# Patient Record
Sex: FEMALE | Race: WHITE | Employment: UNEMPLOYED | URBAN - METROPOLITAN AREA
[De-identification: names, ages, dates, MRNs, and addresses within clinical notes are randomized per-mention and may not be internally consistent; named-entity substitution may affect disease eponyms.]

---

## 2017-01-03 ENCOUNTER — TELEPHONE (OUTPATIENT)
Dept: SURGERY | Facility: CLINIC | Age: 37
End: 2017-01-03

## 2017-01-03 NOTE — TELEPHONE ENCOUNTER
Called patient to inform her that pathology called Dr. Lola Ceron with preliminary results from the core biopsy done 12/31. Preliminary review shows inflammation however the pathology has been sent to [facility] for further review.  Advised patient we will call

## 2017-01-04 ENCOUNTER — TELEPHONE (OUTPATIENT)
Dept: NEUROLOGY | Facility: CLINIC | Age: 37
End: 2017-01-04

## 2017-01-04 NOTE — TELEPHONE ENCOUNTER
Ada Jones, pharmacist with [facility], calling for clarification on Lyrica. Rx written for #90, 100mg BID. Patient had been receiving 100mg TID. Patient also recently discharged from hospital with Rx for 50mg per PCP, Dr. Von Trammell.      Spoke with patient to discu

## 2017-01-05 NOTE — TELEPHONE ENCOUNTER
Left message with Dr. Juanita Espitia office to f/u on medication issue. PSR stated Dr. Juanita Espitia nurse is in a meeting but will pass her the message to call us back. Advised PSR of office hours and phone number.

## 2017-01-06 NOTE — TELEPHONE ENCOUNTER
Spoke with Dr. Pee Juarez and she recommends pt Lyrica decreased to 50mg TID. Dr. Pee Juarez stated that when pt was recently admitted to the hospital pt noted to be sedated, and therefor Dr. Pee Juarez decreased Lyrica. Dr. Pee Juarez will not be taking over Lyrica Rx.

## 2017-01-10 ENCOUNTER — PATIENT MESSAGE (OUTPATIENT)
Dept: SURGERY | Facility: CLINIC | Age: 37
End: 2017-01-10

## 2017-01-10 NOTE — TELEPHONE ENCOUNTER
LMTCB; Per JOSE MIGUEL Sierra, patient to remain at 50mg TID for now. Made [facility] aware. Patient will need new Rx for Lyrica 50mg TID sent to pharmacy. Patient was given Rx from Dr. Elver Song 12/31/16 but never filled.

## 2017-01-10 NOTE — TELEPHONE ENCOUNTER
From: Noelle Gage  To: Alex Wilcox MD  Sent: 1/10/2017 9:14 AM CST  Subject: Prescription Question    Is my prescription for my patch ready to be picked up in [place]

## 2017-01-10 NOTE — TELEPHONE ENCOUNTER
Medication: Etodolac 200 MG    Date of last refill: 12/16/16  [place]    Last office visit: 8/26/16  Due back to clinic per last office note:  2-4 weeks after procedure  Date next office visit scheduled:  2/22/17    L

## 2017-01-11 RX ORDER — ETODOLAC 200 MG/1
CAPSULE ORAL
Qty: 90 CAPSULE | Refills: 0 | Status: SHIPPED | OUTPATIENT
Start: 2017-01-11 | End: 2017-03-16

## 2017-01-11 NOTE — TELEPHONE ENCOUNTER
Patient returning phone call, made aware of medication change to lyrica 50mg TID. Patient verbalized understanding. States she is not in need of refill at this time. No further needs.

## 2017-01-16 ENCOUNTER — OFFICE VISIT (OUTPATIENT)
Dept: SURGERY | Facility: CLINIC | Age: 37
End: 2017-01-16

## 2017-01-16 VITALS
DIASTOLIC BLOOD PRESSURE: 79 MMHG | HEART RATE: 122 BPM | WEIGHT: 195 LBS | SYSTOLIC BLOOD PRESSURE: 101 MMHG | BODY MASS INDEX: 34.55 KG/M2 | TEMPERATURE: 98 F | HEIGHT: 63 IN

## 2017-01-16 DIAGNOSIS — R22.32 MASS OF ARM, LEFT: Primary | ICD-10-CM

## 2017-01-16 PROCEDURE — 99213 OFFICE O/P EST LOW 20 MIN: CPT | Performed by: SURGERY

## 2017-01-17 DIAGNOSIS — M79.89 MASS OF SOFT TISSUE OF LEFT UPPER EXTREMITY: Primary | ICD-10-CM

## 2017-01-17 NOTE — H&P
[facility] Surgical Oncology    Patient Name:  Maritza Newton   YOB: 1980   Gender:  Female   Appt Date:  1/16/2017   Provider:  Jass Wolfe MD   Insurance:  Rosalina Cloud MD   Add •  Milnacipran HCl (SAVELLA) 25 MG Oral Tab, Take 1 tablet by mouth 2 (two) times daily. , Disp: 60 tablet, Rfl: 0  •  fentaNYL 25 MCG/HR Transdermal Patch 72 Hr, Place 1 patch onto the skin every third day.  DO NOT FILL BEFORE 1/5/17, Disp: 10 patch, Rfl: 0 right ankle surg x 3 times - no metal   • Other surgical history       EGD x 5 times   • Other surgical history       bile duct stent   • Other surgical history  08/16/16     left elbow cyst      Reviewed Social History:    Smoking Status: Current Every Musculoskeletal: About 3 cm soft tissue mass left triceps region with left upper extremity weakness and wrist drop. Skin: Inspection and palpation: no jaundice. Document Review:  Pathology reviewed and discussed as above.        Procedure(s):  None

## 2017-01-20 ENCOUNTER — TELEPHONE (OUTPATIENT)
Dept: SURGERY | Facility: CLINIC | Age: 37
End: 2017-01-20

## 2017-01-23 RX ORDER — MILNACIPRAN HYDROCHLORIDE 25 MG/1
TABLET, FILM COATED ORAL
Qty: 60 TABLET | Refills: 0 | Status: SHIPPED | OUTPATIENT
Start: 2017-01-23 | End: 2017-03-16

## 2017-01-23 NOTE — TELEPHONE ENCOUNTER
Medication: Savella 25 MG    Date of last refill: 12/28/16  [place]    Last office visit: 8/26/16  Due back to clinic per last office note:  Return for 2-4 wks after procedure.   Date next office visit scheduled:  2/3/

## 2017-01-24 ENCOUNTER — OFFICE VISIT (OUTPATIENT)
Dept: NEUROLOGY | Facility: CLINIC | Age: 37
End: 2017-01-24

## 2017-01-24 VITALS
BODY MASS INDEX: 34.55 KG/M2 | DIASTOLIC BLOOD PRESSURE: 84 MMHG | HEIGHT: 63 IN | SYSTOLIC BLOOD PRESSURE: 124 MMHG | HEART RATE: 74 BPM | RESPIRATION RATE: 16 BRPM | WEIGHT: 195 LBS

## 2017-01-24 DIAGNOSIS — G56.32 RADIAL NEUROPATHY, LEFT: Primary | ICD-10-CM

## 2017-01-24 DIAGNOSIS — M79.10 MYALGIA: ICD-10-CM

## 2017-01-24 PROCEDURE — 99215 OFFICE O/P EST HI 40 MIN: CPT | Performed by: OTHER

## 2017-01-24 NOTE — PATIENT INSTRUCTIONS
Follow up in 1 month   Have labs done  Refill policies:    • Allow 2 business days for refills; controlled substances may take longer.   • Contact your pharmacy at least 5 days prior to running out of medication and have them send an electronic request or s Precertification and Prior Authorizations  If your physician has recommended that you have a procedure or additional testing performed.   [facility]) will contact your insurance carrier to obtain pre-certification or prior author

## 2017-01-24 NOTE — H&P
[facility] Progress Note    HPI  Irais Mtz is a 44year old woman with past medical history of fibromyalgia, DJD, multiple pain medications, chronic migraines, who was admitted 12/27/16, for new onset of painful swelling in the OTHER SURGICAL HISTORY      Comment right ankle surg x 3 times - no metal    OTHER SURGICAL HISTORY      Comment EGD x 5 times    OTHER SURGICAL HISTORY      Comment bile duct stent    OTHER SURGICAL HISTORY  08/16/16    Comment left elbow cyst     Family Inhale 3 puffs into the lungs as needed for Wheezing., Disp: , Rfl:   •  ALPRAZOLAM ER OR, Take 2 mg by mouth 2 (two) times daily. , Disp: , Rfl:   •  Prazosin HCl 1 MG Oral Cap, Take 2 mg by mouth 2 (two) times daily.  Patient states she is taking Prazosi lesion/mass centered on the proximal to mid muscle belly of the triceps muscle that measures 7.6 cm in greatest dimension.  There is some mild muscle edema within the triceps muscle fibers distal to the lesion as well as evidence    of adjacent subcutaneous innervated muscles.  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   The presence of an evoked response from the radial nerve suggests that the continuity of the nerve is preserved.  The remainder of the nerve conduction studies and

## 2017-01-24 NOTE — PROGRESS NOTES
[facility] Progress Note    HPI  Patient presents with:  Wrist Pain: C/O left wrist pain since having cyst in the arm and have numbness in the arm and unable to lift arm      Penelopeian Julian is a 44year old woman with past medical hi uses cpap at night   • Depression    • Anxiety state, unspecified      also PTSD         Past Surgical History    CHOLECYSTECTOMY      COLONOSCOPY & POLYPECTOMY  5/14    Comment polyp; repeat 5 yrs    COLONOSCOPY,REMHAIR AGEE,SNARE N/A 5/30/2014    Comment Pr (Patient taking differently: Take 50 mg by mouth 2 (two) times daily.  ), Disp: 90 capsule, Rfl: 0  •  fentaNYL 25 MCG/HR Transdermal Patch 72 Hr, Place 1 patch onto the skin every third day.  DO NOT FILL BEFORE 1/5/17, Disp: 10 patch, Rfl: 0  •  Cyclobenza 5/5  WE: 2/5  FF: 5/5  FE: 2/5  APB: 4/5   Interosseous: 5/5      DTR: 2+ symmetric throughout, toes downgoing bilaterally, no clonus  Sensory: reduced over LUE distal to mid forarm over radial aspect of arm, dorsally; also reduced over ulnar aspect dorsal not  appreciated. 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   However, I would recommend careful clinical and radiologic correlation to ensure that the ma oncology. Otherwise, patient was advised, that radial neuropathy may require months to improve, and I would prefer to wait until after repeat excision, prior to considering repeat EMG.   In order to evaluate for possible systemic myopathy, will send a CPK

## 2017-01-25 NOTE — TELEPHONE ENCOUNTER
Informed pt she had 2 fup appts scheduled:2/3 with Miranda Rivers as a PAIN pt and 2/22 with Dr Angeles Gaviria pt only one appt is needed, canceled 2/3 with Bailey;pt understood and no further questions

## 2017-01-26 ENCOUNTER — ANESTHESIA EVENT (OUTPATIENT)
Dept: SURGERY | Facility: HOSPITAL | Age: 37
End: 2017-01-26

## 2017-01-26 NOTE — TELEPHONE ENCOUNTER
Medication: cyclobenzaprine    Date of last refill: 12/28/16  [place]    Last office visit: 8/26/16  Due back to clinic per last office note:  2-4 weeks after injections  Date next office visit scheduled:  2/22/17

## 2017-01-27 ENCOUNTER — HOSPITAL ENCOUNTER (OUTPATIENT)
Facility: HOSPITAL | Age: 37
Setting detail: HOSPITAL OUTPATIENT SURGERY
Discharge: HOME | End: 2017-01-27
Attending: SURGERY | Admitting: SURGERY
Payer: COMMERCIAL

## 2017-01-27 ENCOUNTER — ANESTHESIA (OUTPATIENT)
Dept: SURGERY | Facility: HOSPITAL | Age: 37
End: 2017-01-27

## 2017-01-27 ENCOUNTER — SURGERY (OUTPATIENT)
Age: 37
End: 2017-01-27

## 2017-01-27 VITALS
HEIGHT: 63 IN | WEIGHT: 190 LBS | HEART RATE: 82 BPM | DIASTOLIC BLOOD PRESSURE: 98 MMHG | TEMPERATURE: 99 F | RESPIRATION RATE: 16 BRPM | SYSTOLIC BLOOD PRESSURE: 132 MMHG | OXYGEN SATURATION: 99 % | BODY MASS INDEX: 33.66 KG/M2

## 2017-01-27 DIAGNOSIS — M79.89 MASS OF SOFT TISSUE OF LEFT UPPER EXTREMITY: ICD-10-CM

## 2017-01-27 LAB
POCT LOT NUMBER: NORMAL
POCT URINE PREGNANCY: NEGATIVE

## 2017-01-27 PROCEDURE — 88305 TISSUE EXAM BY PATHOLOGIST: CPT | Performed by: SURGERY

## 2017-01-27 PROCEDURE — 81025 URINE PREGNANCY TEST: CPT | Performed by: SURGERY

## 2017-01-27 PROCEDURE — 0KB80ZX EXCISION OF LEFT UPPER ARM MUSCLE, OPEN APPROACH, DIAGNOSTIC: ICD-10-PCS | Performed by: SURGERY

## 2017-01-27 RX ORDER — SODIUM CHLORIDE, SODIUM LACTATE, POTASSIUM CHLORIDE, CALCIUM CHLORIDE 600; 310; 30; 20 MG/100ML; MG/100ML; MG/100ML; MG/100ML
INJECTION, SOLUTION INTRAVENOUS CONTINUOUS
Status: DISCONTINUED | OUTPATIENT
Start: 2017-01-27 | End: 2017-01-27

## 2017-01-27 RX ORDER — BUPIVACAINE HYDROCHLORIDE 2.5 MG/ML
INJECTION, SOLUTION EPIDURAL; INFILTRATION; INTRACAUDAL AS NEEDED
Status: DISCONTINUED | OUTPATIENT
Start: 2017-01-27 | End: 2017-01-27 | Stop reason: HOSPADM

## 2017-01-27 RX ORDER — LIDOCAINE HYDROCHLORIDE 10 MG/ML
INJECTION, SOLUTION INFILTRATION; PERINEURAL AS NEEDED
Status: DISCONTINUED | OUTPATIENT
Start: 2017-01-27 | End: 2017-01-27 | Stop reason: HOSPADM

## 2017-01-27 RX ORDER — CYCLOBENZAPRINE HCL 10 MG
TABLET ORAL
Qty: 90 TABLET | Refills: 0 | Status: SHIPPED | OUTPATIENT
Start: 2017-01-27 | End: 2017-02-22

## 2017-01-27 NOTE — H&P
There has been no significant change since I saw patient as documented in [facility]. Surgery revisted. To proceed as planned. Yara Adler MD FACS    Director of Surgical Oncology  Department of [address]

## 2017-01-27 NOTE — ANESTHESIA POSTPROCEDURE EVALUATION
[address] Patient Status:  Hospital Outpatient Surgery   Age/Gender 44year old female MRN [id number]   Location [address] [place] [address] Attending Cathy Gibson MD   Hosp Day # 0 PCP Wilbert Ashford MD

## 2017-01-27 NOTE — ANESTHESIA PREPROCEDURE EVALUATION
PRE-OP EVALUATION    Patient Name: Austen Amborse    Pre-op Diagnosis: Mass of soft tissue of left upper extremity [R22.32]    Procedure(s):  Biopsy soft tissue tumor left upper extremity    Surgeon(s) and Role:     Cem Agosto MD - Primary    Pre- known allergies. Anesthesia Evaluation    Patient summary reviewed.     Anesthetic Complications  (-) history of anesthetic complications         GI/Hepatic/Renal      (+) GERD                           Cardiovascular        Exercise tolerance: good 12/29/2016   GLU 87 12/29/2016   CA 8.1* 12/29/2016            Airway      Mallampati: II  Mouth opening: >3 FB  TM distance: > 6 cm  Neck ROM: limited Cardiovascular    Cardiovascular exam normal.         Dental    No notable dental history.          Pulmo

## 2017-01-27 NOTE — BRIEF OP NOTE
[facility] SURGERY  Brief Op Note     Rustam Wangnce Location: OR   [facility] [id number] MRN [id number]   Admission Date 1/27/2017 Operation Date 1/27/2017   Attending Physician Adrian Valladares MD Operating Physician Tayla Samano MD       Pre-Operative Diag

## 2017-01-28 NOTE — OPERATIVE REPORT
[facility]    PATIENT'S NAME: [facility]   ATTENDING PHYSICIAN: Dinesh Jeter. Gregorio Banegas MD   OPERATING PHYSICIAN: Dinesh Jeter.  Gregorio Banegas MD   PATIENT ACCOUNT#:   [id number]    LOCATION:  [address]  MEDICAL RECORD #:   [id number]       DA layers using 3-0 Vicryl and 4-0 Vicryl. Steri-Strips with Telfa and Tegaderm dressings were then applied. Patient tolerated the procedure well without immediate complications.   She was extubated in the operating room and was sent in stable condition to r

## 2017-02-01 ENCOUNTER — TELEPHONE (OUTPATIENT)
Dept: SURGERY | Facility: HOSPITAL | Age: 37
End: 2017-02-01

## 2017-02-01 NOTE — TELEPHONE ENCOUNTER
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  -No malignancy seen.

## 2017-02-06 ENCOUNTER — OFFICE VISIT (OUTPATIENT)
Dept: SURGERY | Facility: CLINIC | Age: 37
End: 2017-02-06

## 2017-02-06 VITALS
WEIGHT: 191.56 LBS | HEIGHT: 63 IN | HEART RATE: 100 BPM | OXYGEN SATURATION: 97 % | TEMPERATURE: 97 F | BODY MASS INDEX: 33.94 KG/M2 | SYSTOLIC BLOOD PRESSURE: 105 MMHG | DIASTOLIC BLOOD PRESSURE: 75 MMHG

## 2017-02-06 DIAGNOSIS — M60.001: Primary | ICD-10-CM

## 2017-02-06 PROCEDURE — 99024 POSTOP FOLLOW-UP VISIT: CPT | Performed by: SURGERY

## 2017-02-06 NOTE — PROGRESS NOTES
Patient returns today for a postoperative visit. She recently underwent incisional biopsy soft tissue mass of the left triceps region. She has been doing fairly well. She complains of nerve pain. She still has weakness in the left upper extremity.

## 2017-02-07 ENCOUNTER — TELEPHONE (OUTPATIENT)
Dept: SURGERY | Facility: CLINIC | Age: 37
End: 2017-02-07

## 2017-02-07 NOTE — TELEPHONE ENCOUNTER
Patient informed of 48 hour refill policy excluding weekends and holidays. Further explained patient will not receive a call back once prescription is ready. Pt will  medication at [facility], [address].

## 2017-02-08 RX ORDER — FENTANYL 25 UG/H
1 PATCH TRANSDERMAL
Qty: 10 PATCH | Refills: 0 | Status: SHIPPED | OUTPATIENT
Start: 2017-02-08 | End: 2017-03-07

## 2017-02-08 NOTE — TELEPHONE ENCOUNTER
Medication: Fentanyl 25 MCG/HR    Date of last refill: 1/5/17  [place] Reviewed 8/14/86    Last office visit: 8/26/16  Due back to clinic per last office note:  Return for 2-4 wks after procedure  Date next office visit

## 2017-02-14 NOTE — TELEPHONE ENCOUNTER
Medication: Cyclobenzaprine 10 MG    Date of last refill: 1/27/16  [place]    Last office visit: 8/26/16  Due back to clinic per last office note:  Return for 2-4 wks after procedure  Date next office visit scheduled:

## 2017-02-15 RX ORDER — CYCLOBENZAPRINE HCL 10 MG
TABLET ORAL
Qty: 90 TABLET | Refills: 0 | Status: SHIPPED | OUTPATIENT
Start: 2017-02-15 | End: 2017-03-16

## 2017-02-22 ENCOUNTER — APPOINTMENT (OUTPATIENT)
Dept: LAB | Age: 37
End: 2017-02-22
Attending: NURSE PRACTITIONER
Payer: COMMERCIAL

## 2017-02-22 DIAGNOSIS — F11.90 NARCOTIC DRUG USE: ICD-10-CM

## 2017-02-22 PROCEDURE — 80307 DRUG TEST PRSMV CHEM ANLYZR: CPT

## 2017-02-22 NOTE — PATIENT INSTRUCTIONS
Refill policies:    • Allow 2 business days for refills; controlled substances may take longer.   • Contact your pharmacy at least 5 days prior to running out of medication and have them send an electronic request or submit request through the “request re your physician has recommended that you have a procedure or additional testing performed. [facility]) will contact your insurance carrier to obtain pre-certification or prior authorization.     Unfortunately, [employer] has seen an increas

## 2017-02-22 NOTE — PROGRESS NOTES
Patient called from lab. Patient unable to provide urine sample. Patient advised that urine will need to be collected prior to any refills. Patient verbalized understanding. Snapshot updated.

## 2017-02-24 ENCOUNTER — APPOINTMENT (OUTPATIENT)
Dept: LAB | Age: 37
End: 2017-02-24
Attending: INTERNAL MEDICINE
Payer: COMMERCIAL

## 2017-02-24 ENCOUNTER — OFFICE VISIT (OUTPATIENT)
Dept: NEUROLOGY | Facility: CLINIC | Age: 37
End: 2017-02-24

## 2017-02-24 VITALS
HEART RATE: 72 BPM | HEIGHT: 63 IN | WEIGHT: 184 LBS | RESPIRATION RATE: 16 BRPM | SYSTOLIC BLOOD PRESSURE: 124 MMHG | DIASTOLIC BLOOD PRESSURE: 62 MMHG | BODY MASS INDEX: 32.6 KG/M2

## 2017-02-24 DIAGNOSIS — M79.10 MYALGIA: ICD-10-CM

## 2017-02-24 DIAGNOSIS — G56.32 RADIAL NEUROPATHY, LEFT: ICD-10-CM

## 2017-02-24 DIAGNOSIS — R29.898 LEFT HAND WEAKNESS: Primary | ICD-10-CM

## 2017-02-24 LAB
6-ACETYLMORHINE CUTOFF 20 NG/ML: NOT DETECTED
7-AMINOCLONAZEPAM 40 NG/ML: PRESENT
A-OH-ALPRAZOLM CUTOFF 20 NG/ML: PRESENT
ALPRAZOLAM CUTOFF 40 NG/ML: PRESENT
AMPHETAMINE CUTOFF 10 NG/ML: NOT DETECTED
BARBITURATES CUTOFF 200 NG/ML: NOT DETECTED
BENZOYLECGONINE  150 NG/ML: NOT DETECTED
BUPRENORPHINE CUTOFF 5 NG/ML: NOT DETECTED
C-REACTIVE PROTEIN: <0.29 MG/DL (ref ?–1)
CARISOPRODOL CUTOFF 100 NG/ML: NOT DETECTED
CK: 117 IU/L (ref 26–192)
CODINE CUTOFF 40 NG/ML: NOT DETECTED
COLNAZEPAM CUTOFF 20 NG/ML: NOT DETECTED
CREATININE,URINE: 123.1 MG/DL
DIAZEPAM CUTOFF 50 NG/ML: NOT DETECTED
ETHYL-GLUCURONIDE 500 NG/ML: NOT DETECTED
FENTANYL CUTOFF 2 NG/ML: PRESENT
HYDROCODONE CUTOFF 40 NG/ML: NOT DETECTED
HYDROMORPHONE CUTOFF 40 NG/ML: NOT DETECTED
LORAZEPAM CUTOFF 60 NG/ML: NOT DETECTED
MARIJUANA CUTOFF 20 NG/ML: NOT DETECTED
MDA CUTOFF 200 NG/ML: NOT DETECTED
MDEA EVE CUTOFF 200 NG/ML: NOT DETECTED
MDMA-ECSTASY CUTOFF 200 NG/ML: NOT DETECTED
MEPERIDINE MET CUTOFF 50 NG/ML: NOT DETECTED
METHADONE CUTOFF 150 NG/ML: NOT DETECTED
METHAMPHETMN CUTOFF 400 NG/ML: NOT DETECTED
METHYLPHENIDATE (CUTOFF 100 NG/ML): NOT DETECTED
MIDAZOLAM CUTOFF 20 NG/ML: NOT DETECTED
MORHINE CUTOFF 20 NG/ML: NOT DETECTED
NORBUPRENORPHINE  20 NG/ML: NOT DETECTED
NORDIAZEPAM CUTOFF 50 NG/ML: NOT DETECTED
NORFENTANYL CUTOFF 2 NG/ML: PRESENT
NORHYDRCODONE CUTOFF 100 NG/ML: NOT DETECTED
NOROXYCODONE CUTOFF 100 NG/ML: NOT DETECTED
NOROXYMORPHNE CUTOFF 100 NG/ML: NOT DETECTED
OXAZEPAM CUTOFF 50 NG/ML: NOT DETECTED
OXYCODONE CUTOFF 40 NG/ML: NOT DETECTED
OXYMORPHONE CUTOFF 40 NG/ML: NOT DETECTED
PCP CUTOFF 25 NG/ML: NOT DETECTED
PHENTERMINE CUTOFF 100 NG/ML: NOT DETECTED
PROPOXYPHENE CUTOFF 300 NG/ML: NOT DETECTED
SED RATE-ML: 9 MM/HR (ref 0–25)
TAPENTADOL CUTOFF 100 NG/ML: NOT DETECTED
TAPENTADOL-O SULF 200 NG/ML: NOT DETECTED
TEMAZEPAM CUTOFF 50 NG/ML: NOT DETECTED
TRAMADOL CUTOFF 200 NG/ML: NOT DETECTED
ZOLPIDEM CUTOFF 20 NG/ML: NOT DETECTED

## 2017-02-24 PROCEDURE — 86140 C-REACTIVE PROTEIN: CPT

## 2017-02-24 PROCEDURE — 99214 OFFICE O/P EST MOD 30 MIN: CPT | Performed by: OTHER

## 2017-02-24 PROCEDURE — 36415 COLL VENOUS BLD VENIPUNCTURE: CPT

## 2017-02-24 PROCEDURE — 85652 RBC SED RATE AUTOMATED: CPT

## 2017-02-24 PROCEDURE — 82550 ASSAY OF CK (CPK): CPT

## 2017-02-24 PROCEDURE — 82085 ASSAY OF ALDOLASE: CPT

## 2017-02-24 NOTE — PATIENT INSTRUCTIONS
Have EMG done earliest convenience   Have labs done as discussed   Follow up after testing   Refill policies:    • Allow 2 business days for refills; controlled substances may take longer.   • Contact your pharmacy at least 5 days prior to running out of me responsible for the entire amount billed. Precertification and Prior Authorizations  If your physician has recommended that you have a procedure or additional testing performed.   [facility]) will contact your insurance carrier

## 2017-02-24 NOTE — PROGRESS NOTES
[facility] Progress Note    HPI  Patient presents with:  Wrist Pain: Follow up on meds    As previously noted   Akash Perea is a 44year old woman with past medical history of fibromyalgia, DJD, multiple pain medications, chroni cpap at night   • Depression    • Anxiety state, unspecified      also PTSD         Past Surgical History    CHOLECYSTECTOMY      COLONOSCOPY & POLYPECTOMY  5/14    Comment polyp; repeat 5 yrs    COLONOSCOPY,REMV CYNDIE,SNARE N/A 5/30/2014    Comment Procedu 3 (three) times daily. (Patient taking differently: Take 50 mg by mouth 2 (two) times daily.  ), Disp: 90 capsule, Rfl: 0  •  fentaNYL 25 MCG/HR Transdermal Patch 72 Hr, Place 1 patch onto the skin every third day.  DO NOT FILL BEFORE 1/5/17, Disp: 10 patch 3+/5  Interosseous: 4/5      DTR: 2+ symmetric throughout, toes downgoing bilaterally, no clonus  Sensory: decreased sensation is noted over the dorsum of the left first digit, as well as over the ulnar aspect of the left hand, with hyperesthesia/allodynia morphologic features raise the possibility of proliferative myositis and reactive processes.   However, I would recommend careful clinical and radiologic correlation to ensure that the mass was adequately sampled as this may  represent atrophic skeletal mus aspect of the left hand, with hyperesthesia/allodynia with palpation over the left tricep region. Overall, I am suspicious that there is more involvement than simply the left radial nerve, and may have more widespread findings.   Proliferative myositis was

## 2017-02-26 LAB — ALDOLASE, SERUM: 4.9 U/L

## 2017-02-27 ENCOUNTER — TELEPHONE (OUTPATIENT)
Dept: SURGERY | Facility: CLINIC | Age: 37
End: 2017-02-27

## 2017-02-27 ENCOUNTER — TELEPHONE (OUTPATIENT)
Dept: NEUROLOGY | Facility: CLINIC | Age: 37
End: 2017-02-27

## 2017-02-27 NOTE — TELEPHONE ENCOUNTER
----- Message from VIET Thompson sent at 2/24/2017  5:34 PM CST -----  Drug screen is positive for Fentanyl, which we are prescribing her.

## 2017-03-05 NOTE — PROGRESS NOTES
Name: Kris Resendiz   : 1980   DOS: 2017     Pain Clinic Follow Up Visit:   Kris Resendiz is a 44year old female who presents for recheck of her chronic low back pain. She is status post bilateral sacroiliac  joint injection.  She had Inhale 3 puffs into the lungs as needed for Wheezing. Disp:  Rfl:    ALPRAZOLAM ER OR Take 2 mg by mouth 2 (two) times daily. Disp:  Rfl:    Prazosin HCl 1 MG Oral Cap Take 2 mg by mouth 2 (two) times daily.  Patient states she is taking Prazosin for PTSD patient. The patient wanted to proceed with the procedure.       Orders:  Orders Placed This Encounter  Pain Management Drug Panel, Urine    Medications filled today:    Signed Prescriptions Disp Refills    pregabalin (LYRICA) 75 MG Oral Cap 90 capsule 0

## 2017-03-07 ENCOUNTER — OFFICE VISIT (OUTPATIENT)
Dept: NEUROLOGY | Facility: CLINIC | Age: 37
End: 2017-03-07

## 2017-03-07 DIAGNOSIS — M79.10 MYALGIA: ICD-10-CM

## 2017-03-07 DIAGNOSIS — G56.32 RADIAL NEUROPATHY, LEFT: ICD-10-CM

## 2017-03-07 DIAGNOSIS — R29.898 LEFT HAND WEAKNESS: Primary | ICD-10-CM

## 2017-03-07 PROCEDURE — 95886 MUSC TEST DONE W/N TEST COMP: CPT | Performed by: OTHER

## 2017-03-07 PROCEDURE — 95913 NRV CNDJ TEST 13/> STUDIES: CPT | Performed by: OTHER

## 2017-03-07 NOTE — TELEPHONE ENCOUNTER
Medication: Fentanyl 25 MCG/HR    Date of last refill: 2/8/17  [place] Reviewed 5/99/61    Last office visit: 2/22/17  Due back to clinic per last office note:  Not noted  Date next office visit scheduled:  Nothing sched

## 2017-03-07 NOTE — TELEPHONE ENCOUNTER
From: Rachel Parikh  To: Vivienne Wiley MD  Sent: 3/7/2017 8:45 AM CST  Subject: Medication Renewal Request    Original authorizing provider: MD Rachel Doll would like a refill of the following medications:  fentaNYL 25 MCG

## 2017-03-08 NOTE — TELEPHONE ENCOUNTER
Script ready for  at   RN needed            The following documentation was provided to patients at time of Rx pickup:      [facility]  [address] [facility] office is committed to providing the best care t

## 2017-03-08 NOTE — TELEPHONE ENCOUNTER
Pt was here in office for Rx . Narcotic agreement already on file. ID verified. Pt sent to lab for urine specimen. Specimen collected and pt arrived to office and  Rx.

## 2017-03-08 NOTE — TELEPHONE ENCOUNTER
Please document last dose pain medication, have pt complete Narcotic agreement, and supply urine specimen prior to Rx pickup. Upload snapshot with future recommended Urine screening date. Thank you.

## 2017-03-08 NOTE — TELEPHONE ENCOUNTER
Spoke with pt and informed her that pt needs to  Rx. Verbalized understanding. No further questions.

## 2017-03-16 DIAGNOSIS — M79.7 FIBROMYALGIA: Primary | ICD-10-CM

## 2017-03-16 NOTE — TELEPHONE ENCOUNTER
Medication: Etodolac 200 MG,  Cyclobenzaprine HCL 10 MG,  Milnacipran HCL 25 MG    Date of last refill: Etodolac 1/11/17,  Cyclobenzaprine 2/15/17,  Alexia Gray 1/23/17  [facility]    Last office visit: 2/22/17  Due back to

## 2017-03-16 NOTE — TELEPHONE ENCOUNTER
From: Lesa Romo  To:  VIET Beyer  Sent: 3/16/2017 7:53 AM CDT  Subject: Medication Renewal Request    Original authorizing provider: VIET Stevens would like a refill of the following medications:  SAVELLA 25 MG Oral T

## 2017-03-16 NOTE — TELEPHONE ENCOUNTER
From: Mira Farr  To: Tootie Kaur MD  Sent: 3/16/2017 7:53 AM CDT  Subject: Medication Renewal Request    Original authorizing provider: MD Mira Pineda would like a refill of the following medications:  ETODOLAC 200 M

## 2017-03-17 RX ORDER — ETODOLAC 200 MG/1
200 CAPSULE ORAL EVERY 8 HOURS
Qty: 90 CAPSULE | Refills: 0 | Status: SHIPPED | OUTPATIENT
Start: 2017-03-17 | End: 2017-04-05

## 2017-03-17 RX ORDER — CYCLOBENZAPRINE HCL 10 MG
10 TABLET ORAL 3 TIMES DAILY PRN
Qty: 90 TABLET | Refills: 0 | Status: SHIPPED | OUTPATIENT
Start: 2017-03-17 | End: 2017-04-13

## 2017-03-20 RX ORDER — PREGABALIN 75 MG/1
75 CAPSULE ORAL 3 TIMES DAILY
Qty: 90 CAPSULE | Refills: 0 | Status: SHIPPED | OUTPATIENT
Start: 2017-03-20 | End: 2017-04-19

## 2017-03-20 NOTE — TELEPHONE ENCOUNTER
Medication: Lyrica 75 MG    Date of last refill: 2/22/17  [place] 1/91/26    Last office visit: 2/22/17  Due back to clinic per last office note:  Not noted  Date next office visit scheduled:  Nothing scheduled    Last O

## 2017-03-20 NOTE — PROCEDURES
[facility]  [address] [place], [address] [facility]  Ph: [phone]  FAX: [phone]        Full Name: Tanner Shrestha Gender: Female  Patient ID: [id number] YOB: 1980      Visit Date: 3/7/2017 14:28  A Wrist Dig V 2.03 2.66 24.6 39.6 Wrist - Dig V 11 54   R Radial - Anatomical snuff box (Forearm)      Forearm Wrist 1.51 2.03 18.2 13.0 Forearm - Wrist 10 66   L Radial - Anatomical snuff box (Forearm)      Forearm Wrist NR NR NR NR Forearm - Wrist 10 NR Muscle Nerve Roots IA Fib PSW Fasc H.F. Amp Dur. PPP Pattern   L. Deltoid Axillary C5-C6 N None None None None N N N N   L. Biceps brachii Musculocutaneous C5-C6 N None None None None N N N N   L.  Triceps brachii Radial C6-C8 N None None None None N N N N Concentric needle EMG was performed on the selected muscles listed above in the table with the following findings.   1. Increased insertional activity was noted in the left EDC and left EIP, with no motor units under voluntary control or recruitment noted;

## 2017-03-21 ENCOUNTER — TELEPHONE (OUTPATIENT)
Dept: SURGERY | Facility: CLINIC | Age: 37
End: 2017-03-21

## 2017-03-21 DIAGNOSIS — M79.89 MASS OF SOFT TISSUE OF LEFT UPPER EXTREMITY: ICD-10-CM

## 2017-03-21 DIAGNOSIS — R29.898 LEFT ARM WEAKNESS: Primary | ICD-10-CM

## 2017-03-21 DIAGNOSIS — M79.89 LEFT UPPER EXTREMITY SWELLING: ICD-10-CM

## 2017-04-05 ENCOUNTER — TELEPHONE (OUTPATIENT)
Dept: SURGERY | Facility: CLINIC | Age: 37
End: 2017-04-05

## 2017-04-05 ENCOUNTER — TELEPHONE (OUTPATIENT)
Dept: NEUROLOGY | Facility: CLINIC | Age: 37
End: 2017-04-05

## 2017-04-05 RX ORDER — FENTANYL 25 UG/H
1 PATCH TRANSDERMAL
Qty: 10 PATCH | Refills: 0 | Status: SHIPPED | OUTPATIENT
Start: 2017-04-05 | End: 2017-05-04

## 2017-04-05 RX ORDER — ETODOLAC 200 MG/1
200 CAPSULE ORAL EVERY 8 HOURS
Qty: 90 CAPSULE | Refills: 0 | Status: SHIPPED | OUTPATIENT
Start: 2017-04-05 | End: 2017-05-05

## 2017-04-05 NOTE — TELEPHONE ENCOUNTER
Script ready for  at          The following documentation was provided to patients at time of Rx pickup:      From the Office of Dr. Darius Up at [facility]:  [address] [facility] office is committed to providing the best care to our patients

## 2017-04-05 NOTE — TELEPHONE ENCOUNTER
From: Christiana Khan  To:  VIET Carlos  Sent: 4/5/2017 11:08 AM CDT  Subject: Medication Renewal Request    Original authorizing provider: VIET Weiner would like a refill of the following medications:  fentaNYL 25 MCG/HR T

## 2017-04-05 NOTE — TELEPHONE ENCOUNTER
From: Rustam Castillo  To: Arsalan Song MD  Sent: 4/5/2017 11:08 AM CDT  Subject: Medication Renewal Request    Original authorizing provider: MD Rustam Goldsmith would like a refill of the following medications:  Etodolac 200 M

## 2017-04-05 NOTE — TELEPHONE ENCOUNTER
From: Edvin Townsedn  To: May Cantu MD  Sent: 4/5/2017 11:11 AM CDT  Subject: Medication Renewal Request    Original authorizing provider: MD Edvin Arredondo would like a refill of the following medications:  Milnacipran HC

## 2017-04-13 NOTE — TELEPHONE ENCOUNTER
Medication: Flexeril    Date of last refill: 3/17/17  [place]    Last office visit: 2/22/17  Due back to clinic per last office note:  Not indicated  Date next office visit scheduled:  None Scheduled.     Last OV note

## 2017-04-14 RX ORDER — CYCLOBENZAPRINE HCL 10 MG
TABLET ORAL
Qty: 90 TABLET | Refills: 0 | Status: SHIPPED | OUTPATIENT
Start: 2017-04-14 | End: 2017-05-10

## 2017-04-14 NOTE — TELEPHONE ENCOUNTER
From: Giulia Randall  To: Abbie Montelongo MD  Sent: 4/13/2017 12:52 PM CDT  Subject: Medication Renewal Request    Original authorizing provider: MD Giulia Darby would like a refill of the following medications:  Cyclobenzapri

## 2017-04-18 RX ORDER — CYCLOBENZAPRINE HCL 10 MG
10 TABLET ORAL 3 TIMES DAILY PRN
Qty: 90 TABLET | Refills: 0 | OUTPATIENT
Start: 2017-04-18

## 2017-04-19 RX ORDER — PREGABALIN 75 MG/1
75 CAPSULE ORAL 3 TIMES DAILY
Qty: 90 CAPSULE | Refills: 0 | Status: SHIPPED | OUTPATIENT
Start: 2017-04-19 | End: 2017-05-04

## 2017-04-19 NOTE — TELEPHONE ENCOUNTER
From: Emily Pepper  To: Chano   Sent: 4/17/2017 9:11 PM CDT  Subject: Medication Renewal Request    Original authorizing provider: BRITTANY Crooks would like a refill of the following medications:  pregabalin (Malu Lars

## 2017-04-19 NOTE — TELEPHONE ENCOUNTER
Medication: Lyrica 75 MG    Date of last refill: 3/20/17  [place] Reviewed 2/64/90    Last office visit: 2/22/17  Due back to clinic per last office note:  Not noted  Date next office visit scheduled:  Nothing scheduled

## 2017-04-20 ENCOUNTER — TELEPHONE (OUTPATIENT)
Dept: SURGERY | Facility: CLINIC | Age: 37
End: 2017-04-20

## 2017-04-20 RX ORDER — METHYLPREDNISOLONE 4 MG/1
TABLET ORAL
Qty: 1 PACKAGE | Refills: 0 | Status: SHIPPED | OUTPATIENT
Start: 2017-04-20 | End: 2017-07-26 | Stop reason: ALTCHOICE

## 2017-04-20 NOTE — TELEPHONE ENCOUNTER
Pt calling c/o \"fibromyagia flare-up\" and states \"even having my clothes on is hurting me. \"  Pt states last flare-up occurred last year and was helped by medrol sierra. Per chart review, last rx for medrol dose pack 03/2016.    Pt inquiring if medication

## 2017-05-05 RX ORDER — FENTANYL 25 UG/H
1 PATCH TRANSDERMAL
Qty: 10 PATCH | Refills: 0 | Status: SHIPPED | OUTPATIENT
Start: 2017-05-06 | End: 2017-06-05

## 2017-05-08 RX ORDER — CYCLOBENZAPRINE HCL 10 MG
TABLET ORAL
Qty: 90 TABLET | Refills: 0 | Status: SHIPPED | OUTPATIENT
Start: 2017-05-08 | End: 2017-07-05

## 2017-05-08 NOTE — TELEPHONE ENCOUNTER
Medication: Cyclobenzaprine 10 MG, Lyrica 75 MG    Date of last refill: Cyclobenzaprine 4/14/17,  Lyrica 4/19/17  [place] Reviewed 0/36/46    Last office visit: 2/22/17  Due back to clinic per last office note:  Not note

## 2017-05-10 NOTE — TELEPHONE ENCOUNTER
Expand All Collapse All    Medication: Melissa Cannon    Date of last refill: 04/15/17  [place]     Last office visit: 2/22/17  Due back to clinic per last office note:  Not noted  Date next office visit scheduled:  Nothing sc

## 2017-05-10 NOTE — TELEPHONE ENCOUNTER
From: Adrianna Horta  To: Shilpa Harvey MD  Sent: 5/6/2017 6:48 AM CDT  Subject: Medication Renewal Request    Original authorizing provider: MD Adrianna Martinez would like a refill of the following medications:  CYCLOBENZAPRINE

## 2017-05-10 NOTE — TELEPHONE ENCOUNTER
From: Adalid Baum  To: VIET Elmore  Sent: 5/6/2017 6:48 AM CDT  Subject: Medication Renewal Request    Original authorizing provider: VIET Garcia would like a refill of the following medications:  Milnacipran HCl (SA

## 2017-05-16 ENCOUNTER — TELEPHONE (OUTPATIENT)
Dept: NEUROLOGY | Facility: CLINIC | Age: 37
End: 2017-05-16

## 2017-05-23 RX ORDER — CYCLOBENZAPRINE HCL 10 MG
10 TABLET ORAL 3 TIMES DAILY PRN
Qty: 90 TABLET | Refills: 0 | Status: SHIPPED | OUTPATIENT
Start: 2017-05-23 | End: 2017-07-02

## 2017-06-01 NOTE — TELEPHONE ENCOUNTER
Medication: Savella 25 MG    Date of last refill: 5/10/17  [place]    Last office visit: 2/22/17  Due back to clinic per last office note:  Not noted  Date next office visit scheduled:  Nothing scheduled    Last OV no

## 2017-06-02 RX ORDER — MILNACIPRAN HYDROCHLORIDE 25 MG/1
TABLET, FILM COATED ORAL
Qty: 60 TABLET | Refills: 0 | Status: SHIPPED | OUTPATIENT
Start: 2017-06-02 | End: 2017-07-02

## 2017-06-05 ENCOUNTER — TELEPHONE (OUTPATIENT)
Dept: SURGERY | Facility: CLINIC | Age: 37
End: 2017-06-05

## 2017-06-05 RX ORDER — PREGABALIN 75 MG/1
CAPSULE ORAL
Qty: 90 CAPSULE | Refills: 0 | Status: CANCELLED | OUTPATIENT
Start: 2017-06-05

## 2017-06-05 RX ORDER — FENTANYL 25 UG/H
1 PATCH TRANSDERMAL
Qty: 10 PATCH | Refills: 0 | Status: SHIPPED | OUTPATIENT
Start: 2017-06-06 | End: 2017-07-06

## 2017-06-05 NOTE — TELEPHONE ENCOUNTER
Medication: Fentanyl 25 mcg    Date of last refill: 5/6/17  [place] Reviewed 5/7/23    Last office visit: 2/22/17  Due back to clinic per last office note:  Not noted  Date next office visit scheduled:  Nothing scheduled

## 2017-06-05 NOTE — TELEPHONE ENCOUNTER
JO. Pt has one standing prescription for Lyrica 75 mg at [facility]. Per Elena Watson, pharmacist last refill date was 4/20/17.

## 2017-06-05 NOTE — TELEPHONE ENCOUNTER
Spoke to Aimee nevarez, pharmacist regarding last Lyrica 75 mg refill. He states it was on 4/20/2017 and there is one standing prescription for the pt. Will call pt.

## 2017-06-05 NOTE — TELEPHONE ENCOUNTER
From: Giulia Randall  To: Abbie Montelongo MD  Sent: 6/3/2017 12:41 AM CDT  Subject: Medication Renewal Request    Original authorizing provider: MD Giulia Darby would like a refill of the following medications:  [facility]

## 2017-06-05 NOTE — TELEPHONE ENCOUNTER
From: Nancy Gomez  To:  VIET Arambula  Sent: 6/3/2017 12:41 AM CDT  Subject: Medication Renewal Request    Original authorizing provider: VIET Luis would like a refill of the following medications:  fentaNYL 25 MCG/HR T

## 2017-06-06 NOTE — TELEPHONE ENCOUNTER
Rx ready for . The following documentation was provided to patients at time of Rx pickup:      From the Office of Dr. Chad Maria at [facility]:  [address] [facility] office is committed to providing the best care to our patients.    Due to the high v

## 2017-06-08 RX ORDER — FENTANYL 25 UG/H
1 PATCH TRANSDERMAL
Qty: 10 PATCH | Refills: 0 | Status: CANCELLED | OUTPATIENT
Start: 2017-06-08

## 2017-06-08 RX ORDER — PREGABALIN 75 MG/1
CAPSULE ORAL
Qty: 90 CAPSULE | Refills: 0 | Status: CANCELLED | OUTPATIENT
Start: 2017-06-08

## 2017-06-08 RX ORDER — CYCLOBENZAPRINE HCL 10 MG
TABLET ORAL
Qty: 90 TABLET | Refills: 0 | OUTPATIENT
Start: 2017-06-08

## 2017-06-08 RX ORDER — CYCLOBENZAPRINE HCL 10 MG
TABLET ORAL
Qty: 90 TABLET | Refills: 0 | Status: CANCELLED | OUTPATIENT
Start: 2017-06-08

## 2017-06-08 NOTE — TELEPHONE ENCOUNTER
From: Emily Pepper  To: Lugenia Cowden, MD  Sent: 6/7/2017 6:43 AM CDT  Subject: Medication Renewal Request    Original authorizing provider: MD Emily Mendoza would like a refill of the following medications:  CYCLOBENZAPRINE

## 2017-06-08 NOTE — TELEPHONE ENCOUNTER
Spoke to pharmacist and he confirmed standing Lyrica prescription. Pharmacy will refill that prescription. Pt aware.

## 2017-06-08 NOTE — TELEPHONE ENCOUNTER
Spoke to pt and re-educated her on the importance of following medication administration instruction. Pt has outstanding Lyrica prescription at her [address] as she requested a new refill from our office. Pt was not aware of that.  Pt stated her pa

## 2017-07-02 ENCOUNTER — TELEPHONE (OUTPATIENT)
Dept: SURGERY | Facility: CLINIC | Age: 37
End: 2017-07-02

## 2017-07-03 NOTE — TELEPHONE ENCOUNTER
Medication: Cyclobenzaprine 10 mg    Date of last refill: 5/23/17  [place]    Last office visit: 2/22/17  Due back to clinic per last office note: not noted  Date next office visit scheduled: nothing scheduled     Las

## 2017-07-03 NOTE — TELEPHONE ENCOUNTER
Medication: Savella 25 MG    Date of last refill: 6/2/17  [place]    Last office visit: 2/22/17  Due back to clinic per last office note:  Not noted  Date next office visit scheduled:  Nothing scheduled    Last OV not

## 2017-07-05 RX ORDER — CYCLOBENZAPRINE HCL 10 MG
TABLET ORAL
Qty: 90 TABLET | Refills: 2 | Status: SHIPPED | OUTPATIENT
Start: 2017-07-05 | End: 2017-09-22

## 2017-07-05 RX ORDER — MILNACIPRAN HYDROCHLORIDE 25 MG/1
TABLET, FILM COATED ORAL
Qty: 60 TABLET | Refills: 0 | Status: SHIPPED | OUTPATIENT
Start: 2017-07-05 | End: 2017-07-30

## 2017-07-05 NOTE — TELEPHONE ENCOUNTER
Pt should schedule OV so we can check how she is doing on all of her fibromyalgia treatments. She is on lyrica and savella. Thank you.  Make sure she brings updated med list.

## 2017-07-06 NOTE — TELEPHONE ENCOUNTER
Medication: Fentanyl 25mcg    Date of last refill: 6/6/2017  [place] reviewed 6/7/6529    Last office visit: 2/22/2017  Due back to clinic per last office note:  No Follow-up on file  Date next office visit scheduled:  7

## 2017-07-07 RX ORDER — FENTANYL 25 UG/H
1 PATCH TRANSDERMAL
Qty: 10 PATCH | Refills: 0 | Status: SHIPPED | OUTPATIENT
Start: 2017-07-07 | End: 2017-08-31

## 2017-07-07 NOTE — TELEPHONE ENCOUNTER
Script ready for                The following documentation was provided to patients at time of Rx pickup:      From the Office of Dr. Bryn Leonardo at [facility]:  [address] [facility] office is committed to providing the best care to our patien

## 2017-07-07 NOTE — TELEPHONE ENCOUNTER
Medication: Lyrica    Date of last refill: 5/8/17 for #90/0 additional refills  [place] 3/3/61 for #90/30 day supply    Last office visit: 2/22/17  Due back to clinic per last office note:  not stated  Date next office v

## 2017-07-10 NOTE — TELEPHONE ENCOUNTER
Rx not able to be faxed to [facility], RN called Rx in. Spoke with pharmacist Dayami regarding Lyrica 75 mg oral tab qty disp: 90 refills: 0. Rx destroyed and witnessed by [facility].

## 2017-07-26 ENCOUNTER — OFFICE VISIT (OUTPATIENT)
Dept: SURGERY | Facility: CLINIC | Age: 37
End: 2017-07-26

## 2017-07-26 VITALS
DIASTOLIC BLOOD PRESSURE: 78 MMHG | BODY MASS INDEX: 31.89 KG/M2 | SYSTOLIC BLOOD PRESSURE: 128 MMHG | HEART RATE: 102 BPM | WEIGHT: 180 LBS | OXYGEN SATURATION: 97 % | RESPIRATION RATE: 18 BRPM | HEIGHT: 63 IN

## 2017-07-26 DIAGNOSIS — M79.7 FIBROMYALGIA: ICD-10-CM

## 2017-07-26 DIAGNOSIS — M47.819 SPONDYLOSIS WITHOUT MYELOPATHY: ICD-10-CM

## 2017-07-26 DIAGNOSIS — M46.1 SACROILIITIS, NOT ELSEWHERE CLASSIFIED (HCC): Primary | ICD-10-CM

## 2017-07-26 DIAGNOSIS — M47.816 LUMBAR FACET ARTHROPATHY: ICD-10-CM

## 2017-07-26 PROCEDURE — 99214 OFFICE O/P EST MOD 30 MIN: CPT | Performed by: PHYSICIAN ASSISTANT

## 2017-07-26 NOTE — PROGRESS NOTES
Name: Edvin Townsend   : 1980   DOS: 2017     Pain Clinic Follow Up Visit:   Patient presents with:   Follow - Up: low back pain, bilateral foot pain      Edvin Townsend is a 44year old female who presents for recheck of chronic low back OR Take 2 mg by mouth 2 (two) times daily. Disp:  Rfl:    Prazosin HCl 1 MG Oral Cap Take 2 mg by mouth 2 (two) times daily. Patient states she is taking Prazosin for PTSD  Disp:  Rfl:    FLUoxetine HCl 20 MG Oral Cap Take 60 mg by mouth daily.    Disp: lyrica 75 and savella 25 and fentanyl 25 mcg. 4. I explained to pt there are not stronger or different medications for her fibromyalgia.      Medications filled today:  No prescriptions requested or ordered in this encounter  Orders:No orders of the defin

## 2017-07-26 NOTE — PATIENT INSTRUCTIONS
Refill policies:    • Allow 2-3 business days for refills; controlled substances may take longer.   • Contact your pharmacy at least 5 days prior to running out of medication and have them send an electronic request or submit request through the [facility] have a procedure or additional testing performed. [facility]) will contact your insurance carrier to obtain pre-certification or prior authorization.     Unfortunately, PAPI has seen an increase in denial of payment even though the p prescriptions will be written by Pain Clinic in [place] on the day of procedure. If you require a refill of medications, please contact the office 48 hours prior to your procedure.   • If you have an implanted Spinal Cord or Peripheral Nerve Stimulator: Please r after your last procedure date   Please call our office with any questions or concerns before or after your procedure at [phone].   If you are a diabetic, please increase the frequency of your glucose monitoring after the procedure as this may cause a Then X-ray or fluoroscopy is used to guide placement of the introducer needles. Since nerves cannot actually be seen on x-ray, the introducer needles are positioned using bony landmarks that indicate where the nerves usually are located.  Thus, the X-ray is easily with the physician during the procedure. However, some patients receive enough sedation that they have amnesia and cannot always remember parts or all of the actual procedure. What should I expect after the radiofrequency ablation?   Initially ther possibility of complications. The risks and complications are dependent upon the sites that are ablated. Since the introducer needles have to go through skin and soft tissues, there will usually be some soreness and occasionally bruising.  The nerves to be

## 2017-07-30 DIAGNOSIS — M79.7 FIBROMYALGIA: Primary | ICD-10-CM

## 2017-07-31 NOTE — TELEPHONE ENCOUNTER
Medication: Savella 25mg    Date of last refill: 7/5/2017  [place] n[place]a    Last office visit: 7/26/2017  Due back to clinic per last office note:  Return for 6 wks post procedure.   Date next office visit scheduled: not Sc

## 2017-08-01 RX ORDER — MILNACIPRAN HYDROCHLORIDE 25 MG/1
TABLET, FILM COATED ORAL
Qty: 60 TABLET | Refills: 0 | Status: SHIPPED | OUTPATIENT
Start: 2017-08-01 | End: 2017-08-01

## 2017-08-02 ENCOUNTER — TELEPHONE (OUTPATIENT)
Dept: SURGERY | Facility: CLINIC | Age: 37
End: 2017-08-02

## 2017-08-02 NOTE — TELEPHONE ENCOUNTER
Spoke w/ pharmacist Michael Monte who states pt picked up Rx today at [facility].  No further needs.

## 2017-08-08 ENCOUNTER — TELEPHONE (OUTPATIENT)
Dept: NEUROLOGY | Facility: CLINIC | Age: 37
End: 2017-08-08

## 2017-08-08 NOTE — TELEPHONE ENCOUNTER
Spoke to Malissa at [place], codes [id number] are valid and billable, no prior authorization or predetermination required.  Call reference: Fadumo Waddell @10:28am dina 8.8.17 call time 6:29

## 2017-08-09 ENCOUNTER — TELEPHONE (OUTPATIENT)
Dept: SURGERY | Facility: CLINIC | Age: 37
End: 2017-08-09

## 2017-08-09 DIAGNOSIS — M46.1 SACROILIITIS (HCC): Primary | ICD-10-CM

## 2017-08-14 NOTE — TELEPHONE ENCOUNTER
Received fax that Rx not received at [address] [place] [address].   Reprinted and placed in outgoing fax bin

## 2017-08-21 RX ORDER — PREGABALIN 75 MG/1
75 CAPSULE ORAL 3 TIMES DAILY
Qty: 90 CAPSULE | Refills: 0 | Status: SHIPPED
Start: 2017-08-21 | End: 2017-09-23

## 2017-08-21 NOTE — TELEPHONE ENCOUNTER
Medication: Pregabalin (lyrica)  75mg    Date of last refill: 7/10/2017  [place] 3/31/9379 reviewed    Last office visit: 7/26/2017  Due back to clinic per last office note:  Return for 6 wks post procedure.   Date next o

## 2017-08-25 RX ORDER — MILNACIPRAN HYDROCHLORIDE 25 MG/1
TABLET, FILM COATED ORAL
Qty: 60 TABLET | Refills: 0 | Status: SHIPPED | OUTPATIENT
Start: 2017-08-25 | End: 2017-09-22

## 2017-08-25 NOTE — TELEPHONE ENCOUNTER
Medication: Savella 25mg    Date of last refill: 8/1/2017  [place] n[place]a    Last office visit: 7/26/2017  Due back to clinic per last office note:  Return for 6 wks post procedure.   Date next office visit scheduled:  Not S

## 2017-08-28 NOTE — TELEPHONE ENCOUNTER
Received an incoming fax from "[employer]" Arredondo Jac for compounded medication. Patient states she just received 3 tubes from [facility] and does not need any additional medication at thsi time.

## 2017-08-31 RX ORDER — FENTANYL 25 UG/H
1 PATCH TRANSDERMAL
Qty: 10 PATCH | Refills: 0 | Status: SHIPPED | OUTPATIENT
Start: 2017-08-31 | End: 2017-09-22

## 2017-08-31 NOTE — TELEPHONE ENCOUNTER
From: Nancy Dickens  Sent: 8/31/2017 11:58 AM CDT  Subject: Medication Renewal Request    Zully Jernigan.  Xavier Linn would like a refill of the following medications:  fentaNYL 25 MCG/HR Transdermal Patch [facility] Sharon Judd MD]    Preferred pharmacy: [facility]

## 2017-08-31 NOTE — TELEPHONE ENCOUNTER
Medication: fentanyl patch    Date of last refill: 7/7/2017  [place] 4/92/8417    Last office visit: 7/26/17  Due back to clinic per last office note:  Return in 6 weeks post procedure  Date next office visit scheduled:

## 2017-09-18 ENCOUNTER — HOSPITAL ENCOUNTER (OUTPATIENT)
Age: 37
Discharge: HOME | End: 2017-09-18
Attending: FAMILY MEDICINE
Payer: COMMERCIAL

## 2017-09-18 ENCOUNTER — APPOINTMENT (OUTPATIENT)
Dept: GENERAL RADIOLOGY | Age: 37
End: 2017-09-18
Attending: FAMILY MEDICINE
Payer: COMMERCIAL

## 2017-09-18 VITALS
RESPIRATION RATE: 20 BRPM | HEART RATE: 95 BPM | SYSTOLIC BLOOD PRESSURE: 109 MMHG | TEMPERATURE: 99 F | OXYGEN SATURATION: 97 % | DIASTOLIC BLOOD PRESSURE: 67 MMHG

## 2017-09-18 DIAGNOSIS — M25.511 ACUTE PAIN OF RIGHT SHOULDER: Primary | ICD-10-CM

## 2017-09-18 PROCEDURE — 96372 THER/PROPH/DIAG INJ SC/IM: CPT

## 2017-09-18 PROCEDURE — 99214 OFFICE O/P EST MOD 30 MIN: CPT

## 2017-09-18 PROCEDURE — 73030 X-RAY EXAM OF SHOULDER: CPT | Performed by: FAMILY MEDICINE

## 2017-09-18 RX ORDER — KETOROLAC TROMETHAMINE 30 MG/ML
60 INJECTION, SOLUTION INTRAMUSCULAR; INTRAVENOUS ONCE
Status: COMPLETED | OUTPATIENT
Start: 2017-09-18 | End: 2017-09-18

## 2017-09-18 NOTE — ED NOTES
Spoke with Judeen Lawton from [facility] about patient's drowsiness and ability to drive and care for her 8 yr old child who was seen for nausea and vomiting.

## 2017-09-18 NOTE — ED NOTES
Patient dozing off in chair sitting up. Arouses easliy. Patient did drive here with her son.  is working.

## 2017-09-18 NOTE — ED INITIAL ASSESSMENT (HPI)
Patient presents with right upper arm/shoulder pain since this am.Does not recall any speciific injury. Denies numbness or tingling.

## 2017-09-18 NOTE — ED PROVIDER NOTES
Patient Seen in: [facility] END    History   Patient presents with:  Shoulder Pain    Stated Complaint: arm pain today no injury    HPI    This 34-year-old female presents the office with complaint of right shoulder pain which start Psychiatric Mother    • lupus[other] Quin Humphrey Sister    • strokes[other] [OTHER] Sister    • Breast Cancer Sister 45   • Ovarian Cancer Sister 45   • Cancer Neg    • Heart Disease Neg    • Stroke Neg        Smoking status: Current Every Day Smoker passively I can bring it to 90°. She is unable to internally rotate the arm due to pain. She has no tenderness over the rotator cuff insertion or along the clavicle. SKIN: Warm and dry.       ED Course   Labs Reviewed - No data to display  Xr Shoulder, C vehicle with her son for discharge to home.       Disposition and Plan     Clinical Impression:  Acute pain of right shoulder  (primary encounter diagnosis)    Disposition:  Discharge    Follow-up:  Dr. Shine Cruz    Call today        Medications Prescribed:  Cur

## 2017-09-18 NOTE — ED NOTES
advised patient not to drive home due to patient's extreme drowsiness. Patient verbalized understanding. Dinesh Pagan called and patient and son escorted out per PCT and placed in [employer]. Will leave her car in parking lot.

## 2017-09-21 ENCOUNTER — TELEPHONE (OUTPATIENT)
Dept: NEUROLOGY | Facility: CLINIC | Age: 37
End: 2017-09-21

## 2017-09-21 NOTE — PATIENT INSTRUCTIONS
Refill policies:    • Allow 2-3 business days for refills; controlled substances may take longer.   • Contact your pharmacy at least 5 days prior to running out of medication and have them send an electronic request or submit request through the [facility] have a procedure or additional testing performed. [facility]) will contact your insurance carrier to obtain pre-certification or prior authorization.     Unfortunately, PAPI has seen an increase in denial of payment even though the p

## 2017-09-22 ENCOUNTER — TELEPHONE (OUTPATIENT)
Dept: NEUROLOGY | Facility: CLINIC | Age: 37
End: 2017-09-22

## 2017-09-22 DIAGNOSIS — M54.12 CERVICAL RADICULITIS: ICD-10-CM

## 2017-09-22 DIAGNOSIS — M79.601 ARM PAIN, DIFFUSE, RIGHT: Primary | ICD-10-CM

## 2017-09-22 RX ORDER — FENTANYL 12 UG/H
1 PATCH TRANSDERMAL
Qty: 10 PATCH | Refills: 0 | Status: SHIPPED | OUTPATIENT
Start: 2017-09-22 | End: 2017-10-22

## 2017-09-22 NOTE — TELEPHONE ENCOUNTER
Spoke with dr Marisel Hurtado. Pt will need C MRI to see what is happening in the neck that could be causing her arm pain. Dr Marisel Hurtado concerned with how sleepy she is.  He understands it could be from the fact that she's not sleeping at night due to pain but it could als

## 2017-09-22 NOTE — PROGRESS NOTES
Name: Rachel Parikh   : 1980   DOS: 2017     Pain Clinic Follow Up Visit:   Patient presents with: Follow - Up: right arm pain      Rachel Parikh is a 44year old female who presents for recheck of chronic neck, back, and arm pain.  Sh Prescriptions:  SAVELLA 25 MG Oral Tab TAKE 1 TABLET BY MOUTH TWICE DAILY Disp: 60 tablet Rfl: 0   pregabalin (LYRICA) 75 MG Oral Cap Take 1 capsule (75 mg total) by mouth 3 (three) times daily.  Disp: 90 capsule Rfl: 0   CUSTOM MEDICATION Diclofenac 3%gel: (primary encounter diagnosis)  Cervical radiculitis  Subdeltoid bursitis, right  Pain in joint of right shoulder    Pain is  limiting functional status. Failed conservative treatment consisting of medications, activity modification, and  PT.   1.I told pt s

## 2017-09-23 RX ORDER — CYCLOBENZAPRINE HCL 10 MG
TABLET ORAL
Qty: 90 TABLET | Refills: 2
Start: 2017-09-23

## 2017-09-25 ENCOUNTER — APPOINTMENT (OUTPATIENT)
Dept: GENERAL RADIOLOGY | Facility: HOSPITAL | Age: 37
End: 2017-09-25
Attending: ANESTHESIOLOGY
Payer: COMMERCIAL

## 2017-09-25 ENCOUNTER — HOSPITAL ENCOUNTER (OUTPATIENT)
Facility: HOSPITAL | Age: 37
Setting detail: HOSPITAL OUTPATIENT SURGERY
Discharge: HOME | End: 2017-09-25
Attending: ANESTHESIOLOGY | Admitting: ANESTHESIOLOGY
Payer: COMMERCIAL

## 2017-09-25 ENCOUNTER — SURGERY (OUTPATIENT)
Age: 37
End: 2017-09-25

## 2017-09-25 VITALS
DIASTOLIC BLOOD PRESSURE: 71 MMHG | OXYGEN SATURATION: 96 % | TEMPERATURE: 98 F | RESPIRATION RATE: 20 BRPM | SYSTOLIC BLOOD PRESSURE: 101 MMHG | BODY MASS INDEX: 32 KG/M2 | WEIGHT: 180 LBS | HEART RATE: 92 BPM

## 2017-09-25 DIAGNOSIS — M47.819 SPONDYLOSIS WITHOUT MYELOPATHY: ICD-10-CM

## 2017-09-25 DIAGNOSIS — M46.1 SACROILIITIS, NOT ELSEWHERE CLASSIFIED (HCC): ICD-10-CM

## 2017-09-25 LAB
POCT LOT NUMBER: NORMAL
POCT URINE PREGNANCY: NEGATIVE

## 2017-09-25 PROCEDURE — 99152 MOD SED SAME PHYS/QHP 5/>YRS: CPT | Performed by: ANESTHESIOLOGY

## 2017-09-25 PROCEDURE — 3E0T3TZ INTRODUCTION OF DESTRUCTIVE AGENT INTO PERIPHERAL NERVES AND PLEXI, PERCUTANEOUS APPROACH: ICD-10-PCS | Performed by: ANESTHESIOLOGY

## 2017-09-25 PROCEDURE — 76000 FLUOROSCOPY <1 HR PHYS/QHP: CPT | Performed by: ANESTHESIOLOGY

## 2017-09-25 PROCEDURE — 81025 URINE PREGNANCY TEST: CPT | Performed by: ANESTHESIOLOGY

## 2017-09-25 RX ORDER — LIDOCAINE HYDROCHLORIDE 10 MG/ML
INJECTION, SOLUTION EPIDURAL; INFILTRATION; INTRACAUDAL; PERINEURAL AS NEEDED
Status: DISCONTINUED | OUTPATIENT
Start: 2017-09-25 | End: 2017-09-25 | Stop reason: HOSPADM

## 2017-09-25 RX ORDER — METHYLPREDNISOLONE ACETATE 40 MG/ML
INJECTION, SUSPENSION INTRA-ARTICULAR; INTRALESIONAL; INTRAMUSCULAR; SOFT TISSUE AS NEEDED
Status: DISCONTINUED | OUTPATIENT
Start: 2017-09-25 | End: 2017-09-25 | Stop reason: HOSPADM

## 2017-09-25 RX ORDER — MILNACIPRAN HYDROCHLORIDE 25 MG/1
TABLET, FILM COATED ORAL
Qty: 60 TABLET | Refills: 2 | Status: SHIPPED | OUTPATIENT
Start: 2017-09-25 | End: 2017-12-14

## 2017-09-25 RX ORDER — MIDAZOLAM HYDROCHLORIDE 1 MG/ML
INJECTION INTRAMUSCULAR; INTRAVENOUS AS NEEDED
Status: DISCONTINUED | OUTPATIENT
Start: 2017-09-25 | End: 2017-09-25 | Stop reason: HOSPADM

## 2017-09-25 RX ORDER — MILNACIPRAN HYDROCHLORIDE 25 MG/1
TABLET, FILM COATED ORAL
Qty: 60 TABLET | Refills: 0 | OUTPATIENT
Start: 2017-09-25

## 2017-09-25 RX ORDER — CYCLOBENZAPRINE HCL 10 MG
TABLET ORAL
Qty: 90 TABLET | Refills: 2 | Status: SHIPPED | OUTPATIENT
Start: 2017-09-25 | End: 2017-12-18

## 2017-09-25 RX ORDER — SODIUM CHLORIDE, SODIUM LACTATE, POTASSIUM CHLORIDE, CALCIUM CHLORIDE 600; 310; 30; 20 MG/100ML; MG/100ML; MG/100ML; MG/100ML
100 INJECTION, SOLUTION INTRAVENOUS CONTINUOUS
Status: DISCONTINUED | OUTPATIENT
Start: 2017-09-25 | End: 2017-09-25

## 2017-09-25 NOTE — TELEPHONE ENCOUNTER
Medication: Savella 25mg    Date of last refill: 8/25/2017  [place] n[place]a    Last office visit: 9/21/2017  Due back to clinic per last office note:  Return for see Dr Asha Mills.   Date next office visit scheduled:  Not Scheduled

## 2017-09-25 NOTE — TELEPHONE ENCOUNTER
Medication: Cyclobenzaprine 10mg    Date of last refill: 7/5/2017  [place] n[place]a    Last office visit: 9/21/2017  Due back to clinic per last office note:  Return for see Dr Simi Kearns.   Date next office visit scheduled:  Not Sc

## 2017-09-25 NOTE — H&P
History & Physical Examination    Patient Name: Kee Falcon  MRN: [id number]  [facility]: [id number]  YOB: 1980    Pre-Operative Diagnosis:  Sacro[facility] not elsewhere [facility] [M46.1]  Spondylosis without myelopathy [M19.90]    Latonia infusion 100 mL/hr Intravenous Continuous   Lidocaine HCl (PF) (XYLOCAINE) 1 % injection SOLN   PRN   methylPREDNISolone acetate (DEPO-medrol) 40 MG/ML injection   PRN       Allergies: No Known Allergies    Past Medical History:   Diagnosis Date   • Anxiet Pain radiates around to the left hip area. HEART [x ] [x ]    LUNGS [x ] [x ]    ABDOMEN [x ] [x ]    UROGENITAL [x ] [x ]    EXTREMITIES [x ] [x ]    OTHER        [ x ] I have discussed the risks and benefits and alternatives with the patient/family.   Gladys Clark

## 2017-09-25 NOTE — OPERATIVE REPORT
[facility]  Operative Report  2017     Adrianna Horta Patient Status:  Hospital Outpatient Surgery    1980 MRN [id number]   Location [address] [place] [address] Attending Shilpa Harvey MD   Hosp Day # 0 [facility] Under fluoroscopic guidance, a 22-gauge, 100-mm SMK needle was advanced into the inferior pole of the left sacroiliac joint. The second needle was placed into the joint but approximately 1 cm cephalad to the first needle.  The subsequent needles were place

## 2017-09-26 RX ORDER — PREGABALIN 75 MG/1
75 CAPSULE ORAL 3 TIMES DAILY
Qty: 90 CAPSULE | Refills: 0 | Status: SHIPPED | OUTPATIENT
Start: 2017-09-26 | End: 2018-01-03

## 2017-09-26 NOTE — TELEPHONE ENCOUNTER
Courtesy called placed to patient for post procedure follow up. Patient stated she was feeling pretty Lawerence Kanaris. Informed patient that soreness is to be expected after the procedure.  Educated patient that it takes 3-5 days for the steroid to be effective and

## 2017-09-26 NOTE — TELEPHONE ENCOUNTER
From: Alex Petersen  Sent: 9/23/2017 9:02 AM CDT  Subject: Medication Renewal Request    Tiffanie Yung.  Juan Huntley would like a refill of the following medications:     pregabalin (LYRICA) 75 MG Oral Cap Delinda Carrel, MD]     SAVELLA 25 MG Oral Tab Delinda Carrel, MD]    Preferred pharmacy: [address] AT [address] [place] [address] [facility] 251, [phone], [phone]          Medication renewals requested in this message routed separately:     CYCLOBENZAPRINE HCL 10 MG Oral Tab Calin Ndiaye PA-C]       Milnacipran HCl (SAVELLA) 25 MG Oral Tab VIET Hubbard]

## 2017-09-26 NOTE — TELEPHONE ENCOUNTER
Spoke with patient and informed patient of message below from [facility]. Pt given [facility] Sched[facility]'s phone number and pt states she will schedule it as soon as possible.   Pt agreeable to bringing in her current fentanyl patch's in exchange for the low

## 2017-09-26 NOTE — TELEPHONE ENCOUNTER
From: Rosana Hernandez  Sent: 9/23/2017 9:02 AM CDT  Subject: Medication Renewal Request    Yinkatamar Jones  Robertjatinder Finn would like a refill of the following medications:     Milnacipran HCl (SAVELLA) 25 MG Oral Tab VIET [place]]    Preferred pharmacy: [address] AT [facility], [phone], [phone]          Medication renewals requested in this message routed separately:     CYCLOBENZAPRINE HCL 10 MG Oral Tab Krystal Goldsmith PA-C]       pregabalin (LYRICA) 75 MG Oral Cap Kandice Stewart MD]     SAVELLA 25 MG Oral Tab Kandice Stewart MD]

## 2017-09-28 ENCOUNTER — TELEPHONE (OUTPATIENT)
Dept: SURGERY | Facility: CLINIC | Age: 37
End: 2017-09-28

## 2017-09-28 NOTE — TELEPHONE ENCOUNTER
Called patient to review the following preoperative instructions. Pt not on ASA or NSAIDs. Verbalized understanding, all questions answered.

## 2017-10-02 ENCOUNTER — SURGERY (OUTPATIENT)
Age: 37
End: 2017-10-02

## 2017-10-02 ENCOUNTER — APPOINTMENT (OUTPATIENT)
Dept: GENERAL RADIOLOGY | Facility: HOSPITAL | Age: 37
End: 2017-10-02
Attending: ANESTHESIOLOGY
Payer: COMMERCIAL

## 2017-10-02 ENCOUNTER — TELEPHONE (OUTPATIENT)
Dept: SURGERY | Facility: CLINIC | Age: 37
End: 2017-10-02

## 2017-10-02 ENCOUNTER — HOSPITAL ENCOUNTER (OUTPATIENT)
Facility: HOSPITAL | Age: 37
Setting detail: HOSPITAL OUTPATIENT SURGERY
Discharge: HOME | End: 2017-10-02
Attending: ANESTHESIOLOGY | Admitting: ANESTHESIOLOGY
Payer: COMMERCIAL

## 2017-10-02 VITALS
DIASTOLIC BLOOD PRESSURE: 85 MMHG | TEMPERATURE: 98 F | OXYGEN SATURATION: 96 % | HEART RATE: 81 BPM | SYSTOLIC BLOOD PRESSURE: 114 MMHG | RESPIRATION RATE: 14 BRPM

## 2017-10-02 DIAGNOSIS — M46.1 SACROILIITIS, NOT ELSEWHERE CLASSIFIED (HCC): ICD-10-CM

## 2017-10-02 DIAGNOSIS — M47.819 SPONDYLOSIS WITHOUT MYELOPATHY: ICD-10-CM

## 2017-10-02 PROCEDURE — 99152 MOD SED SAME PHYS/QHP 5/>YRS: CPT | Performed by: ANESTHESIOLOGY

## 2017-10-02 PROCEDURE — 3E0T3TZ INTRODUCTION OF DESTRUCTIVE AGENT INTO PERIPHERAL NERVES AND PLEXI, PERCUTANEOUS APPROACH: ICD-10-PCS | Performed by: ANESTHESIOLOGY

## 2017-10-02 PROCEDURE — 81025 URINE PREGNANCY TEST: CPT | Performed by: ANESTHESIOLOGY

## 2017-10-02 PROCEDURE — 76000 FLUOROSCOPY <1 HR PHYS/QHP: CPT | Performed by: ANESTHESIOLOGY

## 2017-10-02 RX ORDER — METHYLPREDNISOLONE ACETATE 40 MG/ML
INJECTION, SUSPENSION INTRA-ARTICULAR; INTRALESIONAL; INTRAMUSCULAR; SOFT TISSUE AS NEEDED
Status: DISCONTINUED | OUTPATIENT
Start: 2017-10-02 | End: 2017-10-02 | Stop reason: HOSPADM

## 2017-10-02 RX ORDER — MIDAZOLAM HYDROCHLORIDE 1 MG/ML
INJECTION INTRAMUSCULAR; INTRAVENOUS AS NEEDED
Status: DISCONTINUED | OUTPATIENT
Start: 2017-10-02 | End: 2017-10-02 | Stop reason: HOSPADM

## 2017-10-02 RX ORDER — SODIUM CHLORIDE, SODIUM LACTATE, POTASSIUM CHLORIDE, CALCIUM CHLORIDE 600; 310; 30; 20 MG/100ML; MG/100ML; MG/100ML; MG/100ML
100 INJECTION, SOLUTION INTRAVENOUS CONTINUOUS
Status: DISCONTINUED | OUTPATIENT
Start: 2017-10-02 | End: 2017-10-02

## 2017-10-02 RX ORDER — LIDOCAINE HYDROCHLORIDE 10 MG/ML
INJECTION, SOLUTION EPIDURAL; INFILTRATION; INTRACAUDAL; PERINEURAL AS NEEDED
Status: DISCONTINUED | OUTPATIENT
Start: 2017-10-02 | End: 2017-10-02 | Stop reason: HOSPADM

## 2017-10-02 NOTE — TELEPHONE ENCOUNTER
Pt here in office to  Rx. Pt states she has not had medication for over a week. Pt speech slurred and wearing sunglasses. Pt was unable to produce remaining Rx as pt states she \"flushed them down the toilet. \"  Informed pt that unfortunately, we

## 2017-10-02 NOTE — H&P
History & Physical Examination    Patient Name: Stefany Goodwin  MRN: [id number]  [facility]: [id number]  YOB: 1980    Pre-Operative Diagnosis:  Sacroiliitis, not elsewhere classified ([facility] [M46.1]  Spondylosis without myelopathy [M19.90]    Latonia Intravenous Continuous       Allergies: No Known Allergies    Past Medical History:   Diagnosis Date   • Anxiety state, unspecified     also PTSD   • Asthma    • Depression    • Extrinsic asthma, unspecified    • Fibromyalgia    • Migraines    • Neuropathy [ x ] I have discussed the risks and benefits and alternatives with the patient/family. They understand and agree to proceed with plan of care. [ x ] I have reviewed the History and Physical done within the last 30 days. Any changes noted above.

## 2017-10-02 NOTE — OPERATIVE REPORT
[facility]  Operative Report  10/2/2017     Sury Bailey Patient Status:  Hospital Outpatient Surgery    1980 MRN [id number]   Location [address] [place] [address] Attending No att. providers found   Hosp Day # 0 PCP [facility] anesthesia. Under fluoroscopic guidance, a 22-gauge, 100-mm SMK needle was advanced into the inferior pole of the right sacroiliac joint. The second needle was placed into the joint but approximately 1 cm cephalad to the first needle.  The subsequent needle

## 2017-10-03 ENCOUNTER — APPOINTMENT (OUTPATIENT)
Dept: LAB | Age: 37
End: 2017-10-03
Attending: NURSE PRACTITIONER
Payer: COMMERCIAL

## 2017-10-03 DIAGNOSIS — F11.90 NARCOTIC DRUG USE: ICD-10-CM

## 2017-10-03 PROCEDURE — 80307 DRUG TEST PRSMV CHEM ANLYZR: CPT

## 2017-10-03 NOTE — TELEPHONE ENCOUNTER
Pt here in office for Rx pickup. Narcotic agreement already on file. States last dose 4 or 5 days ago. Pt sent to lab for urine specimen. Pt to return to pickup Rx. Per Sadie Cook, pt to make f/u visit in 2 weeks when Dr. Loreta Mclean is here.

## 2017-10-04 ENCOUNTER — HOSPITAL ENCOUNTER (OUTPATIENT)
Dept: MRI IMAGING | Age: 37
Discharge: HOME | End: 2017-10-04
Attending: PHYSICIAN ASSISTANT
Payer: COMMERCIAL

## 2017-10-04 DIAGNOSIS — M54.12 CERVICAL RADICULITIS: ICD-10-CM

## 2017-10-04 DIAGNOSIS — M79.601 ARM PAIN, DIFFUSE, RIGHT: ICD-10-CM

## 2017-10-04 PROCEDURE — 72141 MRI NECK SPINE W/O DYE: CPT | Performed by: PHYSICIAN ASSISTANT

## 2017-10-05 ENCOUNTER — TELEPHONE (OUTPATIENT)
Dept: SURGERY | Facility: CLINIC | Age: 37
End: 2017-10-05

## 2017-10-05 NOTE — TELEPHONE ENCOUNTER
MISSYTCB regarding following instructions    [address]  PRE-PROCEDURE INSTRUCTIONS WITH IV SEDATION    Appointment Date: 10/9    Arrival Time: 9:30 AM   Procedure Time: 10:30 AM     Prior to the procedure:  Please update us prior to the proc ? Coumadin Procedure may be cancelled if INR is elevated. ? Epidural ____ - 7 days  ? Others 5 days  ? Excedrin (with aspirin) 7 days  ? Plavix (Clopidogrel)  ? Epidural ____ - 10 days  ? Others 7 days   NSAIDs: 24 hours    ?  Ibuprofen (Motrin, Advil, Vi

## 2017-10-09 ENCOUNTER — SURGERY (OUTPATIENT)
Age: 37
End: 2017-10-09

## 2017-10-09 ENCOUNTER — APPOINTMENT (OUTPATIENT)
Dept: GENERAL RADIOLOGY | Facility: HOSPITAL | Age: 37
End: 2017-10-09
Attending: ANESTHESIOLOGY
Payer: COMMERCIAL

## 2017-10-09 ENCOUNTER — HOSPITAL ENCOUNTER (OUTPATIENT)
Facility: HOSPITAL | Age: 37
Setting detail: HOSPITAL OUTPATIENT SURGERY
Discharge: HOME | End: 2017-10-09
Attending: ANESTHESIOLOGY | Admitting: ANESTHESIOLOGY
Payer: COMMERCIAL

## 2017-10-09 VITALS
OXYGEN SATURATION: 94 % | TEMPERATURE: 98 F | DIASTOLIC BLOOD PRESSURE: 79 MMHG | SYSTOLIC BLOOD PRESSURE: 125 MMHG | HEART RATE: 83 BPM | RESPIRATION RATE: 18 BRPM

## 2017-10-09 DIAGNOSIS — M47.816 LUMBAR FACET ARTHROPATHY: ICD-10-CM

## 2017-10-09 DIAGNOSIS — M47.819 SPONDYLOSIS WITHOUT MYELOPATHY: ICD-10-CM

## 2017-10-09 PROCEDURE — 81025 URINE PREGNANCY TEST: CPT | Performed by: ANESTHESIOLOGY

## 2017-10-09 PROCEDURE — 99152 MOD SED SAME PHYS/QHP 5/>YRS: CPT | Performed by: ANESTHESIOLOGY

## 2017-10-09 PROCEDURE — 76000 FLUOROSCOPY <1 HR PHYS/QHP: CPT | Performed by: ANESTHESIOLOGY

## 2017-10-09 PROCEDURE — 3E0T3TZ INTRODUCTION OF DESTRUCTIVE AGENT INTO PERIPHERAL NERVES AND PLEXI, PERCUTANEOUS APPROACH: ICD-10-PCS | Performed by: ANESTHESIOLOGY

## 2017-10-09 RX ORDER — DIPHENHYDRAMINE HYDROCHLORIDE 50 MG/ML
50 INJECTION INTRAMUSCULAR; INTRAVENOUS ONCE AS NEEDED
Status: CANCELLED | OUTPATIENT
Start: 2017-10-09 | End: 2017-10-09

## 2017-10-09 RX ORDER — LIDOCAINE HYDROCHLORIDE 10 MG/ML
INJECTION, SOLUTION EPIDURAL; INFILTRATION; INTRACAUDAL; PERINEURAL AS NEEDED
Status: DISCONTINUED | OUTPATIENT
Start: 2017-10-09 | End: 2017-10-09 | Stop reason: HOSPADM

## 2017-10-09 RX ORDER — ACETAMINOPHEN 325 MG/1
650 TABLET ORAL EVERY 6 HOURS PRN
Status: CANCELLED | OUTPATIENT
Start: 2017-10-09

## 2017-10-09 RX ORDER — SODIUM CHLORIDE, SODIUM LACTATE, POTASSIUM CHLORIDE, CALCIUM CHLORIDE 600; 310; 30; 20 MG/100ML; MG/100ML; MG/100ML; MG/100ML
100 INJECTION, SOLUTION INTRAVENOUS CONTINUOUS
Status: DISCONTINUED | OUTPATIENT
Start: 2017-10-09 | End: 2017-10-09

## 2017-10-09 RX ORDER — ONDANSETRON 2 MG/ML
4 INJECTION INTRAMUSCULAR; INTRAVENOUS ONCE AS NEEDED
Status: CANCELLED | OUTPATIENT
Start: 2017-10-09 | End: 2017-10-09

## 2017-10-09 RX ORDER — MIDAZOLAM HYDROCHLORIDE 1 MG/ML
INJECTION INTRAMUSCULAR; INTRAVENOUS AS NEEDED
Status: DISCONTINUED | OUTPATIENT
Start: 2017-10-09 | End: 2017-10-09 | Stop reason: HOSPADM

## 2017-10-09 RX ORDER — MORPHINE SULFATE 2 MG/ML
2 INJECTION, SOLUTION INTRAMUSCULAR; INTRAVENOUS EVERY 2 HOUR PRN
Status: CANCELLED | OUTPATIENT
Start: 2017-10-09

## 2017-10-09 RX ORDER — HYDROCODONE BITARTRATE AND ACETAMINOPHEN 5; 325 MG/1; MG/1
1 TABLET ORAL EVERY 4 HOURS PRN
Status: CANCELLED | OUTPATIENT
Start: 2017-10-09

## 2017-10-09 RX ORDER — METHYLPREDNISOLONE ACETATE 40 MG/ML
INJECTION, SUSPENSION INTRA-ARTICULAR; INTRALESIONAL; INTRAMUSCULAR; SOFT TISSUE AS NEEDED
Status: DISCONTINUED | OUTPATIENT
Start: 2017-10-09 | End: 2017-10-09 | Stop reason: HOSPADM

## 2017-10-09 NOTE — H&P
History & Physical Examination    Patient Name: Edvin Townsend  MRN: [id number]  [facility]: [id number]  YOB: 1980    Pre-Operative Diagnosis:  Spondylosis without myelopathy [M19.90]  Lumbar facet arthropathy [M12.88]    Present Illness: A 38 yea Intravenous Continuous       Allergies: No Known Allergies    Past Medical History:   Diagnosis Date   • Anxiety state, unspecified     also PTSD   • Asthma    • Depression    • Extrinsic asthma, unspecified    • Fibromyalgia    • Migraines    • Neuropathy OTHER        [ x ] I have discussed the risks and benefits and alternatives with the patient/family. They understand and agree to proceed with plan of care. [ x ] I have reviewed the History and Physical done within the last 30 days.   Any changes noted

## 2017-10-09 NOTE — OPERATIVE REPORT
[facility]  Operative Report  10/9/2017     Pierce Mallory Patient Status:  Hospital Outpatient Surgery    1980 MRN [id number]   Location [address] [place] [address] Attending No att. providers found   Hosp Day # 0 PCP [facility] anesthesia. Under fluoroscopic guidance, a 22-gauge, 100-mm SMK needle was advanced to the junction of the superior aspect of the left L3 transverse process and the lateral aspect of the same level superior articular process at left L2-L3 level .   The nee

## 2017-10-12 ENCOUNTER — TELEPHONE (OUTPATIENT)
Dept: SURGERY | Facility: CLINIC | Age: 37
End: 2017-10-12

## 2017-10-16 ENCOUNTER — HOSPITAL ENCOUNTER (OUTPATIENT)
Facility: HOSPITAL | Age: 37
Setting detail: HOSPITAL OUTPATIENT SURGERY
Discharge: HOME | End: 2017-10-16
Attending: ANESTHESIOLOGY | Admitting: ANESTHESIOLOGY
Payer: COMMERCIAL

## 2017-10-16 ENCOUNTER — APPOINTMENT (OUTPATIENT)
Dept: GENERAL RADIOLOGY | Facility: HOSPITAL | Age: 37
End: 2017-10-16
Attending: ANESTHESIOLOGY
Payer: COMMERCIAL

## 2017-10-16 ENCOUNTER — SURGERY (OUTPATIENT)
Age: 37
End: 2017-10-16

## 2017-10-16 VITALS
RESPIRATION RATE: 15 BRPM | OXYGEN SATURATION: 94 % | TEMPERATURE: 98 F | SYSTOLIC BLOOD PRESSURE: 124 MMHG | DIASTOLIC BLOOD PRESSURE: 86 MMHG | HEART RATE: 83 BPM

## 2017-10-16 DIAGNOSIS — M47.816 LUMBAR FACET ARTHROPATHY: ICD-10-CM

## 2017-10-16 DIAGNOSIS — M47.819 SPONDYLOSIS WITHOUT MYELOPATHY: ICD-10-CM

## 2017-10-16 PROCEDURE — 99152 MOD SED SAME PHYS/QHP 5/>YRS: CPT | Performed by: ANESTHESIOLOGY

## 2017-10-16 PROCEDURE — 76000 FLUOROSCOPY <1 HR PHYS/QHP: CPT | Performed by: ANESTHESIOLOGY

## 2017-10-16 PROCEDURE — 3E0T3TZ INTRODUCTION OF DESTRUCTIVE AGENT INTO PERIPHERAL NERVES AND PLEXI, PERCUTANEOUS APPROACH: ICD-10-PCS | Performed by: ANESTHESIOLOGY

## 2017-10-16 PROCEDURE — 81025 URINE PREGNANCY TEST: CPT | Performed by: ANESTHESIOLOGY

## 2017-10-16 RX ORDER — ONDANSETRON 2 MG/ML
4 INJECTION INTRAMUSCULAR; INTRAVENOUS ONCE AS NEEDED
Status: CANCELLED | OUTPATIENT
Start: 2017-10-16 | End: 2017-10-16

## 2017-10-16 RX ORDER — METHYLPREDNISOLONE ACETATE 40 MG/ML
INJECTION, SUSPENSION INTRA-ARTICULAR; INTRALESIONAL; INTRAMUSCULAR; SOFT TISSUE AS NEEDED
Status: DISCONTINUED | OUTPATIENT
Start: 2017-10-16 | End: 2017-10-16 | Stop reason: HOSPADM

## 2017-10-16 RX ORDER — DIPHENHYDRAMINE HYDROCHLORIDE 50 MG/ML
50 INJECTION INTRAMUSCULAR; INTRAVENOUS ONCE AS NEEDED
Status: CANCELLED | OUTPATIENT
Start: 2017-10-16 | End: 2017-10-16

## 2017-10-16 RX ORDER — ACETAMINOPHEN 325 MG/1
650 TABLET ORAL EVERY 6 HOURS PRN
Status: CANCELLED | OUTPATIENT
Start: 2017-10-16

## 2017-10-16 RX ORDER — MORPHINE SULFATE 2 MG/ML
2 INJECTION, SOLUTION INTRAMUSCULAR; INTRAVENOUS EVERY 2 HOUR PRN
Status: CANCELLED | OUTPATIENT
Start: 2017-10-16

## 2017-10-16 RX ORDER — MIDAZOLAM HYDROCHLORIDE 1 MG/ML
INJECTION INTRAMUSCULAR; INTRAVENOUS AS NEEDED
Status: DISCONTINUED | OUTPATIENT
Start: 2017-10-16 | End: 2017-10-16 | Stop reason: HOSPADM

## 2017-10-16 RX ORDER — HYDROCODONE BITARTRATE AND ACETAMINOPHEN 5; 325 MG/1; MG/1
1 TABLET ORAL EVERY 4 HOURS PRN
Status: CANCELLED | OUTPATIENT
Start: 2017-10-16

## 2017-10-16 RX ORDER — LIDOCAINE HYDROCHLORIDE 10 MG/ML
INJECTION, SOLUTION EPIDURAL; INFILTRATION; INTRACAUDAL; PERINEURAL AS NEEDED
Status: DISCONTINUED | OUTPATIENT
Start: 2017-10-16 | End: 2017-10-16 | Stop reason: HOSPADM

## 2017-10-16 RX ORDER — SODIUM CHLORIDE, SODIUM LACTATE, POTASSIUM CHLORIDE, CALCIUM CHLORIDE 600; 310; 30; 20 MG/100ML; MG/100ML; MG/100ML; MG/100ML
100 INJECTION, SOLUTION INTRAVENOUS CONTINUOUS
Status: DISCONTINUED | OUTPATIENT
Start: 2017-10-16 | End: 2017-10-16

## 2017-10-16 NOTE — OR NURSING
Discharge instructions discussed with patient and family, verbalized understanding. Patient able to ambulate in room without difficulty. Patient denies any numbness or tingling to extremities.

## 2017-10-16 NOTE — OPERATIVE REPORT
[facility]  Operative Report  10/16/2017     Amelia Jones Patient Status:  Hospital Outpatient Surgery    1980 MRN [id number]   Location [facility] Attending No att. providers found   [facility] Day # 0 PCP Randal Azar lidocaine for local anesthesia.   Under fluoroscopic guidance, a 22-gauge, 100-mm SMK needle was advanced to the junction of the superior aspect of the right L3 transverse process and the lateral aspect of the same level superior articular process at right

## 2017-10-16 NOTE — H&P
History & Physical Examination    Patient Name: Hector Villegas  MRN: [id number]  CSN: [id number]  YOB: 1980    Pre-Operative Diagnosis:  Spondylosis without myelopathy [M19.90]  Lumbar facet arthropathy [M12.88]    Present Illness: A 38 yea Known Allergies    Past Medical History:   Diagnosis Date   • Anxiety state, unspecified     also PTSD   • Asthma    • Depression    • Extrinsic asthma, unspecified    • Fibromyalgia    • Migraines    • Neuropathy     legs   • Pain    • Pancreatitis 2011 risks and benefits and alternatives with the patient/family. They understand and agree to proceed with plan of care. [ x ] I have reviewed the History and Physical done within the last 30 days. Any changes noted above.     VIET Sen

## 2017-10-18 ENCOUNTER — TELEPHONE (OUTPATIENT)
Dept: SURGERY | Facility: CLINIC | Age: 37
End: 2017-10-18

## 2017-10-18 ENCOUNTER — OFFICE VISIT (OUTPATIENT)
Dept: SURGERY | Facility: CLINIC | Age: 37
End: 2017-10-18

## 2017-10-18 VITALS
HEART RATE: 108 BPM | WEIGHT: 180 LBS | OXYGEN SATURATION: 97 % | HEIGHT: 62 IN | SYSTOLIC BLOOD PRESSURE: 126 MMHG | RESPIRATION RATE: 18 BRPM | BODY MASS INDEX: 33.13 KG/M2 | DIASTOLIC BLOOD PRESSURE: 88 MMHG

## 2017-10-18 DIAGNOSIS — M79.7 FIBROMYALGIA: Primary | ICD-10-CM

## 2017-10-18 PROCEDURE — 99214 OFFICE O/P EST MOD 30 MIN: CPT | Performed by: ANESTHESIOLOGY

## 2017-10-18 NOTE — PATIENT INSTRUCTIONS
Refill policies:    • Allow 2-3 business days for refills; controlled substances may take longer.   • Contact your pharmacy at least 5 days prior to running out of medication and have them send an electronic request or submit request through the [facility] have a procedure or additional testing performed. [facility]) will contact your insurance carrier to obtain pre-certification or prior authorization.     Unfortunately, PAPI has seen an increase in denial of payment even though the p

## 2017-10-18 NOTE — TELEPHONE ENCOUNTER
Spoke w/ pt and informed of provider message for MRI results:  Pt verbalized understanding and appreciation.       Written by Luisa Giordano PA-C on 10/5/2017  8:49 AM   Karri Danielle- you have Two small bulging discs- not causing any narrowing on the spinal cor

## 2017-10-22 NOTE — PROGRESS NOTES
Name: Stefany Goodwin   : 1980   DOS:      Pain Clinic Follow Up Visit:   Stefany Goodwin is a 44year old female who presents for recheck of her chronic low back pain. She is status post bilateral sacroiliac  joint injection.  She had e MOUTH TWICE DAILY Disp: 60 tablet Rfl: 2   fentaNYL 12 MCG/HR Transdermal Patch 72 Hr Place 1 patch onto the skin every third day.  Disp: 10 patch Rfl: 0   CUSTOM MEDICATION Diclofenac 3%gel:  Apply up to 3 gm (1 pump+1.5 gm) to affected area 3-4 times yuriy joint.    ASSESSMENT AND PLAN:   Fibromyalgia  Cervical radiculitis  Cervical facet arthropathy  Lumbar radiculitis  Lumbar facet arthropathy  Sacroiliitis    The patient will use fentanyl patch 12 mcg for 4 days the current patch instead of 3 days, the ne

## 2017-12-08 ENCOUNTER — CHARTING TRANS (OUTPATIENT)
Dept: OTHER | Age: 37
End: 2017-12-08

## 2017-12-15 RX ORDER — MILNACIPRAN HYDROCHLORIDE 25 MG/1
TABLET, FILM COATED ORAL
Qty: 60 TABLET | Refills: 0 | Status: SHIPPED
Start: 2017-12-15 | End: 2018-04-26 | Stop reason: ALTCHOICE

## 2017-12-15 NOTE — TELEPHONE ENCOUNTER
Medication: Savella 25 MG    Date of last refill: 9/25/17  [place]    Last office visit: 10/18/17  Due back to clinic per last office note:  Not noted  Date next office visit scheduled:  Nothing scheduled    Last OV n

## 2017-12-19 RX ORDER — CYCLOBENZAPRINE HCL 10 MG
TABLET ORAL
Qty: 90 TABLET | Refills: 0 | Status: SHIPPED | OUTPATIENT
Start: 2017-12-19 | End: 2018-02-16

## 2017-12-19 NOTE — TELEPHONE ENCOUNTER
Medication: Cyclobenzaprine 10mg    Date of last refill: 9/25/2017  [place]    Last office visit: 10/18/2017  Due back to clinic per last office note: No Follow-up on file.    Date next office visit scheduled:  No fut

## 2017-12-26 ENCOUNTER — PATIENT MESSAGE (OUTPATIENT)
Dept: SURGERY | Facility: CLINIC | Age: 37
End: 2017-12-26

## 2017-12-27 ENCOUNTER — OFFICE VISIT (OUTPATIENT)
Dept: SURGERY | Facility: CLINIC | Age: 37
End: 2017-12-27

## 2017-12-27 VITALS
SYSTOLIC BLOOD PRESSURE: 136 MMHG | HEART RATE: 80 BPM | BODY MASS INDEX: 31.89 KG/M2 | HEIGHT: 63 IN | WEIGHT: 180 LBS | DIASTOLIC BLOOD PRESSURE: 76 MMHG

## 2017-12-27 DIAGNOSIS — M46.1 SACROILIITIS (HCC): ICD-10-CM

## 2017-12-27 DIAGNOSIS — G57.02 PIRIFORMIS SYNDROME, LEFT: Primary | ICD-10-CM

## 2017-12-27 PROCEDURE — 99213 OFFICE O/P EST LOW 20 MIN: CPT | Performed by: PHYSICIAN ASSISTANT

## 2017-12-27 NOTE — PROGRESS NOTES
Name: Adrianna Horta   : 1980   DOS: 2017     Pain Clinic Follow Up Visit:   Patient presents with: Follow - Up: meds and reevaluate pain      Adrianna Horta is a 44year old female who presents for recheck of chronic low back pain.  Pt  (90 BASE) MCG/ACT Inhalation Aero Soln Inhale 3 puffs into the lungs as needed for Wheezing. Disp:  Rfl:    ALPRAZOLAM ER OR Take 2 mg by mouth 2 (two) times daily. Disp:  Rfl:    Prazosin HCl 1 MG Oral Cap Take 2 mg by mouth 2 (two) times daily. encounter. Radiology orders and consultations:None  The patient indicates understanding of these issues and agrees to the plan. Return for 4 wks post procedure.     Anabell Harris PA-C, 12/27/2017, 2:42 PM

## 2017-12-27 NOTE — PATIENT INSTRUCTIONS
Refill policies:    • Allow 2-3 business days for refills; controlled substances may take longer.   • Contact your pharmacy at least 5 days prior to running out of medication and have them send an electronic request or submit request through the [facility] have a procedure or additional testing performed. [facility]) will contact your insurance carrier to obtain pre-certification or prior authorization.     Unfortunately, [facility] has seen an increase in denial of payment even though the p

## 2017-12-28 ENCOUNTER — TELEPHONE (OUTPATIENT)
Dept: SURGERY | Facility: CLINIC | Age: 37
End: 2017-12-28

## 2017-12-28 DIAGNOSIS — M79.10 MYALGIA: ICD-10-CM

## 2017-12-28 DIAGNOSIS — M79.18 MYOFASCIAL MUSCLE PAIN: Primary | ICD-10-CM

## 2017-12-28 DIAGNOSIS — M46.1 SACROILIITIS (HCC): ICD-10-CM

## 2017-12-28 NOTE — TELEPHONE ENCOUNTER
Received fax from pharmacy stating Rx not covered by insurance. Covered formulation indicated. Rx pended and routed to provider for signature.

## 2017-12-29 ENCOUNTER — TELEPHONE (OUTPATIENT)
Dept: SURGERY | Facility: CLINIC | Age: 37
End: 2017-12-29

## 2017-12-29 NOTE — TELEPHONE ENCOUNTER
Spoke to pt regarding her MyChart message. Pt was seen in office on 12/27/17 and this is the recommendation made for her by Heather Rodríguez and Dr. Omar Carrera: 1. I talked to Dr Omar Carrera- he has no plans on placing her back on the patch or any narcotics in the future.  Pt

## 2017-12-29 NOTE — TELEPHONE ENCOUNTER
----- Message from Arthur Kyle sent at 12/29/2017  7:54 AM CST -----  Regarding: FW: Other  Contact: [phone]      ----- Message -----  From: Rustam Castillo  Sent: 12/29/2017   7:51 AM  To: [facility]  Subject: Other

## 2018-01-03 NOTE — TELEPHONE ENCOUNTER
Medication: Lyrica 75mg    Date of last refill: 9/26/2017  [place] reviewed 4/57/0610    Last office visit: 12/27/2017  Due back to clinic per last office note:  Return for 4 wks post procedure.   Date next office visit s

## 2018-01-04 RX ORDER — CYCLOBENZAPRINE HCL 10 MG
TABLET ORAL
Qty: 90 TABLET | Refills: 0 | Status: SHIPPED | OUTPATIENT
Start: 2018-01-17 | End: 2018-01-10

## 2018-01-04 RX ORDER — CYCLOBENZAPRINE HCL 10 MG
TABLET ORAL
Qty: 90 TABLET | Refills: 0 | OUTPATIENT
Start: 2018-01-04

## 2018-01-05 ENCOUNTER — TELEPHONE (OUTPATIENT)
Dept: SURGERY | Facility: CLINIC | Age: 38
End: 2018-01-05

## 2018-01-05 NOTE — TELEPHONE ENCOUNTER
Per Denise's automated phone system: \"The code you provided does not require prior approval, it is not necessary to speak with an associate. \" Code provided: 15833, confirmation [id number], time on call: 3:21.

## 2018-01-08 ENCOUNTER — OFFICE VISIT (OUTPATIENT)
Dept: SURGERY | Facility: CLINIC | Age: 38
End: 2018-01-08

## 2018-01-08 VITALS
HEART RATE: 80 BPM | WEIGHT: 180 LBS | BODY MASS INDEX: 31.89 KG/M2 | DIASTOLIC BLOOD PRESSURE: 70 MMHG | SYSTOLIC BLOOD PRESSURE: 136 MMHG | HEIGHT: 63 IN

## 2018-01-08 DIAGNOSIS — M79.18 PIRIFORMIS MUSCLE PAIN: Primary | ICD-10-CM

## 2018-01-08 RX ORDER — IBUPROFEN 400 MG/1
400 TABLET ORAL NIGHTLY
COMMUNITY
End: 2020-01-06

## 2018-01-08 NOTE — PATIENT INSTRUCTIONS
Refill policies:    • Allow 2-3 business days for refills; controlled substances may take longer.   • Contact your pharmacy at least 5 days prior to running out of medication and have them send an electronic request or submit request through the [facility] have a procedure or additional testing performed. [facility]) will contact your insurance carrier to obtain pre-certification or prior authorization.     Unfortunately, [facility] has seen an increase in denial of payment even though the p

## 2018-01-08 NOTE — PROGRESS NOTES
50-year-old female patient with left piriformis muscle pain is here for trigger point injection left piriformis muscle. Patient took Advil at 3am this morning so unfortunately, her injection today had to be cancelled per Dr. Gina Laguna.  Patient requested opioid

## 2018-01-10 ENCOUNTER — OFFICE VISIT (OUTPATIENT)
Dept: SURGERY | Facility: CLINIC | Age: 38
End: 2018-01-10

## 2018-01-10 VITALS
WEIGHT: 180 LBS | BODY MASS INDEX: 31.89 KG/M2 | DIASTOLIC BLOOD PRESSURE: 72 MMHG | SYSTOLIC BLOOD PRESSURE: 124 MMHG | HEIGHT: 63 IN | RESPIRATION RATE: 18 BRPM | HEART RATE: 68 BPM

## 2018-01-10 DIAGNOSIS — M79.18 PIRIFORMIS MUSCLE PAIN: Primary | ICD-10-CM

## 2018-01-10 PROCEDURE — 20552 NJX 1/MLT TRIGGER POINT 1/2: CPT | Performed by: ANESTHESIOLOGY

## 2018-01-10 PROCEDURE — 76942 ECHO GUIDE FOR BIOPSY: CPT | Performed by: ANESTHESIOLOGY

## 2018-01-10 RX ORDER — LIDOCAINE HYDROCHLORIDE 10 MG/ML
10 INJECTION, SOLUTION INFILTRATION; PERINEURAL ONCE
Status: COMPLETED | OUTPATIENT
Start: 2018-01-10 | End: 2018-01-10

## 2018-01-10 RX ORDER — METHYLPREDNISOLONE ACETATE 40 MG/ML
80 INJECTION, SUSPENSION INTRA-ARTICULAR; INTRALESIONAL; INTRAMUSCULAR; SOFT TISSUE ONCE
Status: COMPLETED | OUTPATIENT
Start: 2018-01-10 | End: 2018-01-10

## 2018-01-10 NOTE — PROGRESS NOTES
44year old female patient with left piriformis muscle pain is here for trigger point injection left piriformis muscle.  She continues to have pain to palpation over the left piriformis muscle and we have recommended that she proceed with the injection proc

## 2018-01-10 NOTE — PATIENT INSTRUCTIONS
Refill policies:    • Allow 2-3 business days for refills; controlled substances may take longer.   • Contact your pharmacy at least 5 days prior to running out of medication and have them send an electronic request or submit request through the [facility] have a procedure or additional testing performed. [facility]) will contact your insurance carrier to obtain pre-certification or prior authorization.     Unfortunately, [facility] has seen an increase in denial of payment even though the p

## 2018-01-10 NOTE — PROGRESS NOTES
Here for piriformis injection on left side. States feels like muscle is tearing every time she stands up, unable to put on shoes. Feels like pulling from butt down to knee. Uses cane when it hurts badly.  Brought letter from [facility] that shows that the claim o

## 2018-01-14 NOTE — PROCEDURES
[facility]  Operative Report  1/10/2018     Noelle Gage Patient Status:  No patient class for patient encounter    1980 MRN [id number]   Location [address] [place] [address], [address] [place] [address], [address] [place] [address] Attending No att. providers found   [facility]

## 2018-01-30 ENCOUNTER — TELEPHONE (OUTPATIENT)
Dept: SURGERY | Facility: CLINIC | Age: 38
End: 2018-01-30

## 2018-01-30 NOTE — TELEPHONE ENCOUNTER
Pt states that at her last job, she had black outs which her Neurologist diagnosed as coming from chronic pain. Pt states that she is once again having black outs at work.   Pt states her pain is unbearable, she is unable to sleep, the Piriformis injections

## 2018-01-30 NOTE — TELEPHONE ENCOUNTER
Spoke with patient and informed patient of message below. Pt verbalized understanding. No further questions at this time. Dr. Anderson Costello address and phone number sent to pt via [employer].

## 2018-01-30 NOTE — TELEPHONE ENCOUNTER
Pt will need to have a full neurologic work up for her black outs. Spoke with dr Moshe Byers and pain cannot cause black outs or amnesia. We will not be prescribing narcotics for her at any point in the future.  If that is what she is looking to have she can see

## 2018-02-12 ENCOUNTER — TELEPHONE (OUTPATIENT)
Dept: SURGERY | Facility: CLINIC | Age: 38
End: 2018-02-12

## 2018-02-12 ENCOUNTER — PATIENT MESSAGE (OUTPATIENT)
Dept: SURGERY | Facility: CLINIC | Age: 38
End: 2018-02-12

## 2018-02-15 NOTE — TELEPHONE ENCOUNTER
Will you check with her if she is still taking Savella? I think it would be best to try to increase Savella if possible. Would you find out what dose she is currently taking.  We can only use so many steroid medications so we want to limit that and find a b

## 2018-02-16 ENCOUNTER — TELEPHONE (OUTPATIENT)
Dept: SURGERY | Facility: CLINIC | Age: 38
End: 2018-02-16

## 2018-02-16 RX ORDER — CYCLOBENZAPRINE HCL 10 MG
TABLET ORAL
Qty: 90 TABLET | Refills: 0 | Status: SHIPPED | OUTPATIENT
Start: 2018-02-16 | End: 2018-03-14

## 2018-02-16 NOTE — TELEPHONE ENCOUNTER
Per Moon Delgadillo in response to patient 2/15 My Chart Message:    VIET Carpenter Signed  Creation Time: 02/15/2018 4:23 PM         Will you check with her if she is still taking Savella?  I think it would be best to try to increase Savella if possible

## 2018-02-22 NOTE — TELEPHONE ENCOUNTER
Message sent to pt via ipsy asking for a call back to discuss [facility].

## 2018-03-16 ENCOUNTER — TELEPHONE (OUTPATIENT)
Dept: SURGERY | Facility: CLINIC | Age: 38
End: 2018-03-16

## 2018-03-16 RX ORDER — GABAPENTIN 100 MG/1
100 CAPSULE ORAL 3 TIMES DAILY
Qty: 90 CAPSULE | Refills: 0 | Status: SHIPPED | OUTPATIENT
Start: 2018-03-16 | End: 2018-04-10

## 2018-03-16 RX ORDER — CYCLOBENZAPRINE HCL 10 MG
TABLET ORAL
Qty: 90 TABLET | Refills: 0 | Status: SHIPPED | OUTPATIENT
Start: 2018-03-16 | End: 2018-04-12

## 2018-03-16 NOTE — TELEPHONE ENCOUNTER
Pt calling to update that Lyrica and Conda Mustache are too expensive as pt has not met her insurance deductible this yet. Pt has been out of Lyrica for a week and a half and took her last does of Savella 3 days ago.  Gabapentin was suggested by Pharmacist as a le

## 2018-03-16 NOTE — TELEPHONE ENCOUNTER
Cyclobenzaprine sent to the pharmacy. Patient also started on gabapentin 100mg TID, advise patient to take the 1st dose at night to see if there is any sedating effect and that it will take at least 2 weeks to see the benefit from the medication.

## 2018-04-11 NOTE — TELEPHONE ENCOUNTER
Medication: Gabapentin 100    Date of last refill: 3/16/18  [place]     Last office visit: 1/10/18  Due back to clinic per last office note:  None noted  Date next office visit scheduled:  None scheduled    Last OV note

## 2018-04-12 RX ORDER — CYCLOBENZAPRINE HCL 10 MG
TABLET ORAL
Qty: 90 TABLET | Refills: 0 | Status: CANCELLED
Start: 2018-04-12

## 2018-04-12 RX ORDER — GABAPENTIN 100 MG/1
CAPSULE ORAL
Qty: 90 CAPSULE | Refills: 0 | Status: SHIPPED | OUTPATIENT
Start: 2018-04-12 | End: 2018-05-10

## 2018-04-12 RX ORDER — GABAPENTIN 100 MG/1
100 CAPSULE ORAL 3 TIMES DAILY
Qty: 90 CAPSULE | Refills: 0 | Status: CANCELLED
Start: 2018-04-12 | End: 2018-05-12

## 2018-04-12 NOTE — TELEPHONE ENCOUNTER
Medication: cyclobenzaprine 10mg    Date of last refill: 3/16/18  [place]      Last office visit: 1/10/18  Due back to clinic per last office note:  None noted  Date next office visit scheduled:  None scheduled     Last

## 2018-04-12 NOTE — TELEPHONE ENCOUNTER
From: Kee Falcon  Sent: 4/12/2018 9:02 AM CDT  Subject: Medication Renewal Request    Yosef Figueroa.  Sulema Gamino would like a refill of the following medications:     CYCLOBENZAPRINE HCL 10 MG Oral Tab Viola Serum, APN]     gabapentin 100 MG Oral Cap Elver Guzmán

## 2018-04-13 ENCOUNTER — TELEPHONE (OUTPATIENT)
Dept: SURGERY | Facility: CLINIC | Age: 38
End: 2018-04-13

## 2018-04-13 RX ORDER — CYCLOBENZAPRINE HCL 10 MG
10 TABLET ORAL EVERY 8 HOURS PRN
Qty: 90 TABLET | Refills: 0 | Status: SHIPPED | OUTPATIENT
Start: 2018-04-13 | End: 2018-05-10

## 2018-04-16 ENCOUNTER — PATIENT MESSAGE (OUTPATIENT)
Dept: SURGERY | Facility: CLINIC | Age: 38
End: 2018-04-16

## 2018-04-16 NOTE — TELEPHONE ENCOUNTER
From: Austen Ambrose  Sent: 4/12/2018 7:14 PM CDT  Subject: Medication Renewal Request    Mitch Harmon.  Charly Becker would like a refill of the following medications:     CYCLOBENZAPRINE HCL 10 MG Oral Tab VIET Chun]    Preferred pharmacy: [address]

## 2018-04-19 ENCOUNTER — CHARTING TRANS (OUTPATIENT)
Dept: OTHER | Age: 38
End: 2018-04-19

## 2018-04-26 ENCOUNTER — TELEPHONE (OUTPATIENT)
Dept: SURGERY | Facility: CLINIC | Age: 38
End: 2018-04-26

## 2018-04-26 ENCOUNTER — TELEPHONE (OUTPATIENT)
Dept: NEUROLOGY | Facility: CLINIC | Age: 38
End: 2018-04-26

## 2018-04-26 ENCOUNTER — OFFICE VISIT (OUTPATIENT)
Dept: SURGERY | Facility: CLINIC | Age: 38
End: 2018-04-26

## 2018-04-26 VITALS
HEART RATE: 91 BPM | WEIGHT: 185 LBS | DIASTOLIC BLOOD PRESSURE: 114 MMHG | BODY MASS INDEX: 32.78 KG/M2 | HEIGHT: 63 IN | SYSTOLIC BLOOD PRESSURE: 132 MMHG

## 2018-04-26 DIAGNOSIS — M46.1 SACROILIITIS (HCC): ICD-10-CM

## 2018-04-26 DIAGNOSIS — G57.02 PIRIFORMIS SYNDROME, LEFT: Primary | ICD-10-CM

## 2018-04-26 DIAGNOSIS — M79.7 FIBROMYALGIA: ICD-10-CM

## 2018-04-26 DIAGNOSIS — M54.16 LUMBAR RADICULITIS: ICD-10-CM

## 2018-04-26 PROCEDURE — 99213 OFFICE O/P EST LOW 20 MIN: CPT | Performed by: ANESTHESIOLOGY

## 2018-04-26 RX ORDER — AMOXICILLIN 875 MG/1
TABLET, COATED ORAL
COMMUNITY
Start: 2018-04-19 | End: 2018-05-09 | Stop reason: ALTCHOICE

## 2018-04-26 RX ORDER — METHYLPREDNISOLONE 4 MG/1
TABLET ORAL
Qty: 1 PACKAGE | Refills: 0 | Status: SHIPPED | OUTPATIENT
Start: 2018-04-26 | End: 2018-05-09 | Stop reason: ALTCHOICE

## 2018-04-26 NOTE — PATIENT INSTRUCTIONS
Refill policies:    • Allow 2-3 business days for refills; controlled substances may take longer.   • Contact your pharmacy at least 5 days prior to running out of medication and have them send an electronic request or submit request through the “request re entire amount billed. Precertification and Prior Authorizations: If your physician has recommended that you have a procedure or additional testing performed.   [facility]) will contact your insurance carrier to obtain pre-certi

## 2018-04-26 NOTE — PROGRESS NOTES
Name: Austen Ambrose   : 1980   DOS: 2018     Pain Clinic Follow Up Visit:   Patient presents with:   Follow - Up: left hip/buttock pain      Austen Ambrose is a 43year old female with a history of left-sided low back pain and a diagnosi Diclofenac Sodium 0.15%,  Lidocaine 2.25%, Prilocaine 2.25% TC #480 grams.   Apply 4 grams three to four times daily for treatment of pain (PAINNORM) Disp: 1 each Rfl: 11   Albuterol Sulfate  (90 BASE) MCG/ACT Inhalation Aero Soln Inhale 3 puffs into Prescriptions Disp Refills    methylPREDNISolone (MEDROL) 4 MG Oral Tablet Therapy Pack 1 Package 0      Sig: Day 1: Two tablets before breakfast, one after lunch, one after dinner, and two at bedtime. Day 2: One tablet before breakfast, one after lunch, on

## 2018-04-26 NOTE — TELEPHONE ENCOUNTER
Per Dr. Marycruz Rodriguez, MRI does not need to be ordered STAT. Cody Baeza at Outpatient notified, no further needs.

## 2018-04-26 NOTE — TELEPHONE ENCOUNTER
Pt is having pain in her left buttocks down to her foot. Every time pt has an chiropractic adjustment they tell her that her hip is out of place. Pt is also seeing physical therapy. Scheduled pt to see Dr. Muna Tarango today at 3:00.  No additional needs at this ti

## 2018-05-02 ENCOUNTER — HOSPITAL ENCOUNTER (OUTPATIENT)
Dept: MRI IMAGING | Age: 38
Discharge: HOME | End: 2018-05-02
Attending: ANESTHESIOLOGY
Payer: COMMERCIAL

## 2018-05-02 ENCOUNTER — PATIENT MESSAGE (OUTPATIENT)
Dept: SURGERY | Facility: CLINIC | Age: 38
End: 2018-05-02

## 2018-05-02 ENCOUNTER — TELEPHONE (OUTPATIENT)
Dept: SURGERY | Facility: CLINIC | Age: 38
End: 2018-05-02

## 2018-05-02 ENCOUNTER — HOSPITAL ENCOUNTER (EMERGENCY)
Facility: HOSPITAL | Age: 38
Discharge: HOME | End: 2018-05-02
Attending: EMERGENCY MEDICINE
Payer: COMMERCIAL

## 2018-05-02 ENCOUNTER — APPOINTMENT (OUTPATIENT)
Dept: GENERAL RADIOLOGY | Facility: HOSPITAL | Age: 38
End: 2018-05-02
Attending: EMERGENCY MEDICINE
Payer: COMMERCIAL

## 2018-05-02 VITALS
WEIGHT: 195 LBS | TEMPERATURE: 98 F | HEART RATE: 97 BPM | RESPIRATION RATE: 14 BRPM | BODY MASS INDEX: 34.55 KG/M2 | OXYGEN SATURATION: 98 % | DIASTOLIC BLOOD PRESSURE: 61 MMHG | SYSTOLIC BLOOD PRESSURE: 99 MMHG | HEIGHT: 63 IN

## 2018-05-02 DIAGNOSIS — G57.02 PIRIFORMIS SYNDROME, LEFT: ICD-10-CM

## 2018-05-02 DIAGNOSIS — M25.562 ACUTE PAIN OF LEFT KNEE: Primary | ICD-10-CM

## 2018-05-02 PROCEDURE — 99283 EMERGENCY DEPT VISIT LOW MDM: CPT

## 2018-05-02 PROCEDURE — 72197 MRI PELVIS W/O & W/DYE: CPT | Performed by: ANESTHESIOLOGY

## 2018-05-02 PROCEDURE — 73560 X-RAY EXAM OF KNEE 1 OR 2: CPT | Performed by: EMERGENCY MEDICINE

## 2018-05-02 PROCEDURE — A9576 INJ PROHANCE MULTIPACK: HCPCS

## 2018-05-02 RX ORDER — HYDROCODONE BITARTRATE AND ACETAMINOPHEN 10; 325 MG/1; MG/1
1 TABLET ORAL EVERY 6 HOURS PRN
Qty: 15 TABLET | Refills: 0 | Status: SHIPPED | OUTPATIENT
Start: 2018-05-02 | End: 2019-07-10 | Stop reason: ALTCHOICE

## 2018-05-02 NOTE — ED PROVIDER NOTES
Patient Seen in: [facility] Emergency Department    History   Patient presents with:  Lower Extremity Injury (musculoskeletal)    Stated Complaint: L leg pain, something \"popped in PT'    HPI    Patient is a 54-year-old female who presents emergenc times - no metal  No date: OTHER SURGICAL HISTORY      Comment: EGD x 5 times  No date: OTHER SURGICAL HISTORY      Comment: bile duct stent  08/16/16: OTHER SURGICAL HISTORY      Comment: left elbow cyst  No date: REMOVAL GALLBLADDER        Smoking status gross motor or sensory deficits appreciated. Patient is answering all questions appropriately. Patient is ambulatory here in the emergency room.            ED Course   Labs Reviewed - No data to display    ED Course as of May 02 1214  --------------------

## 2018-05-02 NOTE — TELEPHONE ENCOUNTER
See My Chart message:   Pt was at physical therapy yesterday and something popped behind the left knee, causing extreme pain. Pt was unable to sleep and can barely put any weight on it today.  Pt reports no swelling or bruising is noted, but she was barely

## 2018-05-02 NOTE — TELEPHONE ENCOUNTER
VIET Ziegler who reviewed pt's chart. Per Nithya Jones, pt should go to the ER if her pain in her knee is so bad she is having difficulty walking. Informed pt that our office will not prescribe pain medicationfor her knee.  Pt verbalized understanding

## 2018-05-03 ENCOUNTER — TELEPHONE (OUTPATIENT)
Dept: SURGERY | Facility: CLINIC | Age: 38
End: 2018-05-03

## 2018-05-03 NOTE — TELEPHONE ENCOUNTER
Spoke with pt and informed of message from Dr. Muna Tarango who stated she needs to see OB/Gyn for a uterine fibroid and ovarian cyst. Pt verbalized understanding and will f/u with OB/GYN. No additional needs at this time.

## 2018-05-05 ENCOUNTER — PATIENT MESSAGE (OUTPATIENT)
Dept: SURGERY | Facility: CLINIC | Age: 38
End: 2018-05-05

## 2018-05-07 ENCOUNTER — TELEPHONE (OUTPATIENT)
Dept: SURGERY | Facility: CLINIC | Age: 38
End: 2018-05-07

## 2018-05-07 NOTE — TELEPHONE ENCOUNTER
Pt wanted to notify our office that she went to [facility] ER yesterday because her legs gave out. She called 911 and in the ambulance she received \"shot of fentanyl\". She wanted to notify us because of her Narcotic Agreement with [facility].  Pt explaine

## 2018-05-08 NOTE — TELEPHONE ENCOUNTER
Called [facility] to inquire the treatment notes. The RN transferred me to medical records. Not able to speak to anyone. Will try later today.

## 2018-05-08 NOTE — TELEPHONE ENCOUNTER
LM for pt letting her know that the providers are aware of her MyChart message. Pt has an OV tomorrow at 3:30 pm with Dr. Kerry Rosales. The plan of care will be discussed with her during the appointment. No further needs.

## 2018-05-09 ENCOUNTER — OFFICE VISIT (OUTPATIENT)
Dept: SURGERY | Facility: CLINIC | Age: 38
End: 2018-05-09

## 2018-05-09 VITALS
BODY MASS INDEX: 32.78 KG/M2 | DIASTOLIC BLOOD PRESSURE: 94 MMHG | WEIGHT: 185 LBS | HEIGHT: 63 IN | SYSTOLIC BLOOD PRESSURE: 122 MMHG | HEART RATE: 84 BPM

## 2018-05-09 DIAGNOSIS — M79.7 FIBROMYALGIA: Primary | ICD-10-CM

## 2018-05-09 PROCEDURE — 99214 OFFICE O/P EST MOD 30 MIN: CPT | Performed by: ANESTHESIOLOGY

## 2018-05-09 RX ORDER — PREDNISONE 20 MG/1
TABLET ORAL
COMMUNITY
Start: 2018-05-07 | End: 2019-07-10 | Stop reason: ALTCHOICE

## 2018-05-09 RX ORDER — MELOXICAM 15 MG/1
TABLET ORAL
COMMUNITY
Start: 2018-05-07 | End: 2019-07-10

## 2018-05-09 NOTE — PATIENT INSTRUCTIONS
Refill policies:    • Allow 2-3 business days for refills; controlled substances may take longer.   • Contact your pharmacy at least 5 days prior to running out of medication and have them send an electronic request or submit request through the “request re entire amount billed. Precertification and Prior Authorizations: If your physician has recommended that you have a procedure or additional testing performed.   [facility]) will contact your insurance carrier to obtain pre-certi

## 2018-05-11 RX ORDER — GABAPENTIN 100 MG/1
CAPSULE ORAL
Qty: 90 CAPSULE | Refills: 0 | Status: SHIPPED | OUTPATIENT
Start: 2018-05-11 | End: 2018-06-03

## 2018-05-11 RX ORDER — CYCLOBENZAPRINE HCL 10 MG
10 TABLET ORAL EVERY 8 HOURS PRN
Qty: 90 TABLET | Refills: 0 | Status: SHIPPED
Start: 2018-05-11 | End: 2018-06-11

## 2018-05-11 NOTE — TELEPHONE ENCOUNTER
Medication: Gabapentin 100    Date of last refill: 4/12/18  [place]    Last office visit: 5/9/18  Due back to clinic per last office note:    Date next office visit scheduled:  None scheduled    Last OV note recommendation:     Notes not completed

## 2018-05-11 NOTE — TELEPHONE ENCOUNTER
From: Luz Marina Doran  Sent: 5/10/2018 6:16 PM CDT  Subject: Medication Renewal Request    Fredo Valencia.  Cherri Andres would like a refill of the following medications:     Cyclobenzaprine HCl 10 MG Oral Tab VIET Schneider]    Preferred pharmacy: [facility]

## 2018-05-13 NOTE — PROGRESS NOTES
Name: Sury Bailey   : 1980   DOS: 2018    Pain Clinic Follow Up Visit:   Sury Bailey is a 43year old female who presents for recheck of her chronic low back pain.   The patient had multiple interventional treatment done with good p Inhale 3 puffs into the lungs as needed for Wheezing. Disp:  Rfl:    ALPRAZOLAM ER OR Take 2 mg by mouth 2 (two) times daily. Disp:  Rfl:    Prazosin HCl 1 MG Oral Cap Take 2 mg by mouth 2 (two) times daily.  Patient states she is taking Prazosin for PTSD 3 times daily. The patient got a short prescription of [place] from the urgent care. We are not prescribing narcotics for her.   She is being followed with her psychologist and psychiatrist.    Orders:No orders of the defined types were placed in this encou

## 2018-06-04 RX ORDER — GABAPENTIN 100 MG/1
200 CAPSULE ORAL EVERY 8 HOURS
Qty: 180 CAPSULE | Refills: 0 | Status: SHIPPED | OUTPATIENT
Start: 2018-06-04 | End: 2018-06-29

## 2018-06-04 NOTE — TELEPHONE ENCOUNTER
Spoke with patient. She states that she is taking 200mg three times daily. Has not gone up to 300mg three times daily yet.

## 2018-06-12 RX ORDER — CYCLOBENZAPRINE HCL 10 MG
TABLET ORAL
Qty: 90 TABLET | Refills: 0 | Status: SHIPPED | OUTPATIENT
Start: 2018-06-12 | End: 2018-07-15

## 2018-07-02 RX ORDER — GABAPENTIN 100 MG/1
CAPSULE ORAL
Qty: 180 CAPSULE | Refills: 0 | Status: SHIPPED | OUTPATIENT
Start: 2018-07-03 | End: 2018-07-25

## 2018-07-16 RX ORDER — CYCLOBENZAPRINE HCL 10 MG
TABLET ORAL
Qty: 90 TABLET | Refills: 0 | Status: SHIPPED | OUTPATIENT
Start: 2018-07-16 | End: 2018-08-17

## 2018-07-26 RX ORDER — GABAPENTIN 300 MG/1
300 CAPSULE ORAL 3 TIMES DAILY
Qty: 90 CAPSULE | Refills: 0 | Status: SHIPPED | OUTPATIENT
Start: 2018-07-26 | End: 2018-08-27

## 2018-07-26 NOTE — TELEPHONE ENCOUNTER
Medication: GABAPENTIN 100 MG Oral Cap    Date of last refill: 7/2/18  [place]    Last office visit: 5/9/18  Due back to clinic per last office note:  Not stated  Date next office visit scheduled:  None scheduled

## 2018-07-26 NOTE — TELEPHONE ENCOUNTER
Increased medication to 300mg tid per Dr. Zachariah Rocha note, please heave her call with any questions or concerns.

## 2018-08-20 RX ORDER — CYCLOBENZAPRINE HCL 10 MG
TABLET ORAL
Qty: 90 TABLET | Refills: 0 | Status: SHIPPED | OUTPATIENT
Start: 2018-08-20 | End: 2018-09-24

## 2018-08-27 RX ORDER — GABAPENTIN 300 MG/1
300 CAPSULE ORAL 3 TIMES DAILY
Qty: 90 CAPSULE | Refills: 0 | Status: SHIPPED | OUTPATIENT
Start: 2018-08-27 | End: 2018-09-22

## 2018-08-27 NOTE — TELEPHONE ENCOUNTER
Medication: gabapentin 300 MG Oral Cap    Date of last refill: 7/26/18  [place]    Last office visit: 5/9/18  Due back to clinic per last office note:  Not noted  Date next office visit scheduled: not scheduled    La

## 2018-09-24 RX ORDER — CYCLOBENZAPRINE HCL 10 MG
TABLET ORAL
Qty: 90 TABLET | Refills: 0 | Status: SHIPPED | OUTPATIENT
Start: 2018-09-24 | End: 2018-10-22

## 2018-09-24 RX ORDER — GABAPENTIN 300 MG/1
CAPSULE ORAL
Qty: 90 CAPSULE | Refills: 0 | Status: SHIPPED | OUTPATIENT
Start: 2018-09-25 | End: 2019-07-10 | Stop reason: ALTCHOICE

## 2018-09-24 NOTE — TELEPHONE ENCOUNTER
Medication: CYCLOBENZAPRINE HCL 10 MG    Date of last refill: 8/20/18  [place]    Last office visit: 5/9/18  Due back to clinic per last office note:  None noted  Date next office visit scheduled:  None scheduled

## 2018-09-24 NOTE — TELEPHONE ENCOUNTER
Medication: gabapentin 300 MG     Date of last refill: 8/27/18  [place]    Last office visit: 5/9/18  Due back to clinic per last office note:  None noted  Date next office visit scheduled:  None scheduled    Last UR

## 2018-10-22 RX ORDER — CYCLOBENZAPRINE HCL 10 MG
TABLET ORAL
Qty: 90 TABLET | Refills: 0 | Status: SHIPPED | OUTPATIENT
Start: 2018-10-23 | End: 2018-11-20

## 2018-11-01 VITALS
RESPIRATION RATE: 14 BRPM | TEMPERATURE: 97.6 F | HEART RATE: 80 BPM | OXYGEN SATURATION: 98 % | SYSTOLIC BLOOD PRESSURE: 110 MMHG | DIASTOLIC BLOOD PRESSURE: 84 MMHG

## 2018-11-02 VITALS
DIASTOLIC BLOOD PRESSURE: 72 MMHG | HEART RATE: 88 BPM | SYSTOLIC BLOOD PRESSURE: 110 MMHG | RESPIRATION RATE: 14 BRPM | TEMPERATURE: 98.2 F

## 2018-11-20 NOTE — TELEPHONE ENCOUNTER
Medication: CYCLOBENZAPRINE HCL 10 MG Oral Tab    Date of last refill: 10/23/18  [place]    Last office visit: 5/9/18  Due back to clinic per last office note:  NOT INDICATED  Date next office visit scheduled:  NONE

## 2018-11-21 RX ORDER — CYCLOBENZAPRINE HCL 10 MG
TABLET ORAL
Qty: 90 TABLET | Refills: 0 | Status: SHIPPED | OUTPATIENT
Start: 2018-11-21 | End: 2018-12-15

## 2018-11-21 RX ORDER — ESZOPICLONE 3 MG/1
TABLET, FILM COATED ORAL
Qty: 30 TABLET | Refills: 0 | OUTPATIENT
Start: 2018-11-21

## 2018-12-17 RX ORDER — CYCLOBENZAPRINE HCL 10 MG
TABLET ORAL
Qty: 90 TABLET | Refills: 0 | Status: SHIPPED | OUTPATIENT
Start: 2018-12-17 | End: 2019-01-11

## 2019-01-14 RX ORDER — CYCLOBENZAPRINE HCL 10 MG
TABLET ORAL
Qty: 90 TABLET | Refills: 0 | Status: SHIPPED | OUTPATIENT
Start: 2019-01-14 | End: 2019-02-06

## 2019-02-06 RX ORDER — CYCLOBENZAPRINE HCL 10 MG
TABLET ORAL
Qty: 90 TABLET | Refills: 0 | Status: SHIPPED | OUTPATIENT
Start: 2019-02-06 | End: 2019-03-05

## 2019-03-05 RX ORDER — CYCLOBENZAPRINE HCL 10 MG
TABLET ORAL
Qty: 90 TABLET | Refills: 0 | Status: SHIPPED | OUTPATIENT
Start: 2019-03-05 | End: 2019-04-01

## 2019-03-05 NOTE — TELEPHONE ENCOUNTER
Medication: CYCLOBENZAPRINE HCL 10 MG Oral Tab    Date of last refill: 2/6/19  [place]    Last office visit: 5/9/18  Due back to clinic per last office note:  No Follow-up on file.   Date next office visit scheduled:

## 2019-04-02 RX ORDER — CYCLOBENZAPRINE HCL 10 MG
TABLET ORAL
Qty: 90 TABLET | Refills: 0 | Status: SHIPPED | OUTPATIENT
Start: 2019-04-02 | End: 2019-05-02

## 2019-04-02 NOTE — TELEPHONE ENCOUNTER
Medication: CYCLOBENZAPRINE HCL 10 MG Oral Tab    Date of last refill: 3/5/19  [place]    Last office visit: 5/9/19  Due back to clinic per last office note:  Not indicated  Date next office visit scheduled:  None sc

## 2019-05-03 RX ORDER — CYCLOBENZAPRINE HCL 10 MG
TABLET ORAL
Qty: 90 TABLET | Refills: 0 | Status: SHIPPED | OUTPATIENT
Start: 2019-05-03 | End: 2019-06-01

## 2019-05-03 RX ORDER — CYCLOBENZAPRINE HCL 10 MG
TABLET ORAL
Qty: 90 TABLET | Refills: 0 | Status: CANCELLED | OUTPATIENT
Start: 2019-05-03

## 2019-05-03 NOTE — TELEPHONE ENCOUNTER
Medication: CYCLOBENZAPRINE HCL 10 MG Oral Tab    Date of last refill: 4/2/19  [place]    Last office visit: 5/9/18  Due back to clinic per last office note:  Not indicated  Date next office visit scheduled:  None sc

## 2019-06-01 RX ORDER — CYCLOBENZAPRINE HCL 10 MG
TABLET ORAL
Qty: 90 TABLET | Refills: 0 | Status: CANCELLED | OUTPATIENT
Start: 2019-06-01

## 2019-06-04 RX ORDER — CYCLOBENZAPRINE HCL 10 MG
TABLET ORAL
Qty: 90 TABLET | Refills: 0 | Status: SHIPPED | OUTPATIENT
Start: 2019-06-04 | End: 2019-09-24

## 2019-06-04 NOTE — TELEPHONE ENCOUNTER
Medication: CYCLOBENZAPRINE HCL 10 MG Oral Tab    Date of last refill: 5/3/19  [place]    Last office visit: 5/9/18  Due back to clinic per last office note:  Not indicated  Date next office visit scheduled:  None sc

## 2019-07-02 RX ORDER — CYCLOBENZAPRINE HCL 10 MG
TABLET ORAL
Qty: 90 TABLET | Refills: 0 | OUTPATIENT
Start: 2019-07-02

## 2019-07-02 NOTE — TELEPHONE ENCOUNTER
Patient last seen in office 5/2018, needs to be seen in office for further refills. Patient can also ask her PCP to take over writing the prescription for the flexeril.

## 2019-07-05 RX ORDER — CYCLOBENZAPRINE HCL 10 MG
TABLET ORAL
Qty: 90 TABLET | Refills: 0 | OUTPATIENT
Start: 2019-07-05

## 2019-07-05 NOTE — TELEPHONE ENCOUNTER
7/2/19 12:32 PM   Note      Patient last seen in office 5/2018, needs to be seen in office for further refills. Patient can also ask her PCP to take over writing the prescription for the flexeril.

## 2019-07-05 NOTE — TELEPHONE ENCOUNTER
Medication: CYCLOBENZAPRINE HCL 10 MG Oral Tab    Date of last refill: 6/4/19  [place]    Last office visit: 5/9/18  Due back to clinic per last office note:  Not indicated  Date next office visit scheduled:  None sc

## 2019-07-08 ENCOUNTER — TELEPHONE (OUTPATIENT)
Dept: SURGERY | Facility: CLINIC | Age: 39
End: 2019-07-08

## 2019-07-08 RX ORDER — CYCLOBENZAPRINE HCL 10 MG
TABLET ORAL
Qty: 90 TABLET | Refills: 0 | OUTPATIENT
Start: 2019-07-08

## 2019-07-08 NOTE — TELEPHONE ENCOUNTER
olivertcpola to schedule a fup appt with the Pain Clinic (Dr Darius Up pt) for a medication check;refill request for cyclobenzaprine will not be refilled until appt completed due to last OV 5/2018 or pt can have PCP take over this prescription per [facility]

## 2019-07-08 NOTE — TELEPHONE ENCOUNTER
Please call pt and advised that we need to see patients for theses mediations at lease once a year; thus she needs to be scheduled a follow up visit; if pt wishes to continue taking medication need to see her at least once a year.  She can have another prov

## 2019-07-08 NOTE — TELEPHONE ENCOUNTER
Pt called back informing the Pain Clinic that the refill request for cyclobenzaprine was a mistake-Dr Brandy Garcia who is pts spine Dr is filling that script and pt will inform her pharmacy-no appt scheduled with the Pain clinic

## 2019-07-08 NOTE — TELEPHONE ENCOUNTER
Pt calling asking for same-day nerve blocks, and to see the soonest available; pt hung up while on hold for RN

## 2019-07-08 NOTE — TELEPHONE ENCOUNTER
Medication: CYCLOBENZAPRINE HCL 10 MG Oral Tab    Date of last refill: 6/4/19  [place]    Last office visit: 5/9/18  Due back to clinic per last office note:  Not indicated   Date next office visit scheduled: None

## 2019-07-09 NOTE — TELEPHONE ENCOUNTER
LM informing pt same-day procedures are not possible d/t ins as well as the fact that her last OV was in May of 2018. Advised pt to call back and schedule an OV to determine recommendations for any procedures.

## 2019-07-17 NOTE — TELEPHONE ENCOUNTER
Left a detailed message on patient's confidentail voicemail relaying below. When returns call please help patient schedule a follow up appointment with Dr Kerry Rosales or one of the APN's for med review and refill.

## 2019-07-31 ENCOUNTER — TELEPHONE (OUTPATIENT)
Dept: PAIN CLINIC | Facility: CLINIC | Age: 39
End: 2019-07-31

## 2019-07-31 ENCOUNTER — OFFICE VISIT (OUTPATIENT)
Dept: PAIN CLINIC | Facility: CLINIC | Age: 39
End: 2019-07-31
Payer: COMMERCIAL

## 2019-07-31 VITALS
DIASTOLIC BLOOD PRESSURE: 60 MMHG | SYSTOLIC BLOOD PRESSURE: 100 MMHG | WEIGHT: 214 LBS | HEIGHT: 64 IN | HEART RATE: 94 BPM | OXYGEN SATURATION: 98 % | BODY MASS INDEX: 36.54 KG/M2

## 2019-07-31 DIAGNOSIS — M54.16 LUMBAR RADICULITIS: Primary | ICD-10-CM

## 2019-07-31 PROCEDURE — 99214 OFFICE O/P EST MOD 30 MIN: CPT | Performed by: ANESTHESIOLOGY

## 2019-07-31 RX ORDER — CYCLOBENZAPRINE HCL 10 MG
10 TABLET ORAL 3 TIMES DAILY PRN
Qty: 90 TABLET | Refills: 0 | Status: SHIPPED | OUTPATIENT
Start: 2019-07-31 | End: 2019-08-24

## 2019-07-31 NOTE — TELEPHONE ENCOUNTER
Medical clearance needed- no    Pt seen in OV today by Dr. Darius Up and recommended for TLESI (X 1). Please begin PA process for procedure(s).      Laterality: left  Level(s): L5-S2    Pt informed callback will be given when PA feedback received and procedure

## 2019-07-31 NOTE — PATIENT INSTRUCTIONS
Refill policies:    • Allow 2-3 business days for refills; controlled substances may take longer.   • Contact your pharmacy at least 5 days prior to running out of medication and have them send an electronic request or submit request through the “request re Depending on your insurance carrier, approval may take 3-10 days. It is highly recommended patients contact their insurance carrier directly to determine coverage.   If test is done without insurance authorization, patient may be responsible for the entire AND/OR RESCHEDULING: PLEASE CALL [facility] PRE-PROCEDURE LINE [phone] FOR DETAILED INSTRUCTIONS FIVE TO SEVEN DAYS PRIOR TO PROCEDURE**        Prior to the procedure:  Please update us prior to the procedure if you are experiencing any symptoms of infect (Etanercept) 24 hours   • Fragmin (Dalteparin) 24 hours   • Pletal (Cilostazol) 7 days  • Effient (Prasugrel) 7 days  • Pradaxa 10 days  • Trental 7 days  • Eliquis (Apixaban) 3 days  • Xarelto (Rivaroxaban) 3 days  • Lovenox (Enoxaparin) 24 hours  • Aspir sugar rises as you may require some medication adjustment. It is normal to have increased pain at injection site for up to 3-5 days after procedure, you can        use heat or ice (20 minutes on 20 minutes off) for comfort.     ** TO AVOID CANCELLATI sedation and might not remember parts or all of the actual procedure. How is the transforaminal injection performed? It is done either with the patient on their side for most neck injections and with the patient on their stomach for back injections.  Occ your symptoms in 1 to 2 weeks a third injection may be recommended. Can I have more than three transforaminal injections? In a six-month period, most patients do not receive more than three injections.  This is because the effect of the medication inject

## 2019-07-31 NOTE — PROGRESS NOTES
Spoke with patient regarding injection(s). Reviewed prior auth process and pre-op instructions. Patient verbalized understanding.  Agreeable to holding ibuprofen medications as indicated in instructions listed in AVS. Encouraged pt to contact office if frias

## 2019-08-04 NOTE — PROGRESS NOTES
Name: Rachel Pino   : 1980   DOS: 2019    Pain Clinic Follow Up Visit:   Rachel Pino is a 35year old female who presents for recheck of her chronic low back pain.   The patient had multiple interventional treatment done with good p Tablet Therapy Pack MDP Disp: 1 Package Rfl: 0   CYCLOBENZAPRINE HCL 10 MG Oral Tab TAKE 1 TABLET(10 MG) BY MOUTH EVERY 8 HOURS AS NEEDED FOR MUSCLE SPASMS (Patient taking differently: Take 10 mg by mouth.  ) Disp: 90 tablet Rfl: 0   ibuprofen 400 MG Oral discussion with the patient. I wanted her to be seen by [facility] pain clinic but the patient wanted to stay with us.   I recommended her to come back for a left transforaminal lumbar epidural steroid injection at left L5-S1 and S1-S2 level to s

## 2019-08-05 NOTE — TELEPHONE ENCOUNTER
Prior authorization request completed for: TLESI   Authorization #No Prior Authorization is Required   Spoke with Maureen Sylvester at [facility] [phone]  Reference #:[id number]   Time:03:45    Authorization dates: N/A   CPT codes approved: 21687,56008  Number

## 2019-08-26 RX ORDER — CYCLOBENZAPRINE HCL 10 MG
TABLET ORAL
Qty: 90 TABLET | Refills: 0 | Status: SHIPPED | OUTPATIENT
Start: 2019-08-26 | End: 2019-09-23

## 2019-08-28 ENCOUNTER — TELEPHONE (OUTPATIENT)
Dept: SURGERY | Facility: CLINIC | Age: 39
End: 2019-08-28

## 2019-08-28 NOTE — TELEPHONE ENCOUNTER
Left message for patient, confirmed procedure date of 9/3/19 with Dr Eduardo Sharma and to be checked in at outpatient registration at 2:00 pm. Patient instructed to call pre-procedure line before procedure at [phone]. Patient instructed to call office if there are additional questions after listening to pre-procedure line.

## 2019-09-23 NOTE — TELEPHONE ENCOUNTER
Medication: CYCLOBENZAPRINE HCL 10 MG Oral Tab    Date of last refill: 8/26/19  [place]    Last office visit: 7/31/19  Due back to clinic per last office note:  Not indicated  Date next office visit scheduled:  None

## 2019-09-24 RX ORDER — CYCLOBENZAPRINE HCL 10 MG
TABLET ORAL
Qty: 90 TABLET | Refills: 0 | Status: SHIPPED | OUTPATIENT
Start: 2019-09-24 | End: 2019-10-24

## 2019-10-01 ENCOUNTER — HOSPITAL ENCOUNTER (OUTPATIENT)
Age: 39
Discharge: HOME | End: 2019-10-01
Payer: COMMERCIAL

## 2019-10-01 VITALS
RESPIRATION RATE: 16 BRPM | HEART RATE: 85 BPM | SYSTOLIC BLOOD PRESSURE: 109 MMHG | DIASTOLIC BLOOD PRESSURE: 75 MMHG | TEMPERATURE: 98 F | OXYGEN SATURATION: 100 %

## 2019-10-01 DIAGNOSIS — H53.10: ICD-10-CM

## 2019-10-01 DIAGNOSIS — S05.01XA ABRASION OF RIGHT CORNEA, INITIAL ENCOUNTER: Primary | ICD-10-CM

## 2019-10-01 PROCEDURE — 99213 OFFICE O/P EST LOW 20 MIN: CPT

## 2019-10-01 PROCEDURE — 99214 OFFICE O/P EST MOD 30 MIN: CPT

## 2019-10-01 RX ORDER — OFLOXACIN 3 MG/ML
1 SOLUTION/ DROPS OPHTHALMIC 4 TIMES DAILY
Qty: 1 BOTTLE | Refills: 0 | Status: SHIPPED | OUTPATIENT
Start: 2019-10-01 | End: 2020-01-06 | Stop reason: ALTCHOICE

## 2019-10-01 RX ORDER — TETRACAINE HYDROCHLORIDE 5 MG/ML
1 SOLUTION OPHTHALMIC ONCE
Status: COMPLETED | OUTPATIENT
Start: 2019-10-01 | End: 2019-10-01

## 2019-10-01 NOTE — ED PROVIDER NOTES
Patient Seen in: [address] [facility]      History   Patient presents with:  Eye Problem    Stated Complaint: EYE ISSUE X 3 DAYS    HPI    40-year-old female here with complaint of right eye irritation and foreign body for the past 2 days Alex Wilcox MD at [facility] MAIN OR   • LUMBAR INTERLAMINAR EPIDURAL INJECTION N/A 3/23/2016    Performed by Alex Wilcox MD at [facility] MAIN OR   • OTHER      Mass in the left arm   • OTHER SURGICAL HISTORY      right ankle surg x 3 times - no metal   • OTH 6/10/2014    Performed by Bari Posada MD at [facility] MAIN OR   • [address] INJECTION OF BILATERAL KNEE JOINT Bilateral 10/11/2016    Performed by Bari Posada MD at [facility] MAIN OR   • TRANSFORAMINAL EPIDURAL - LUMBAR N/A 7/22/2019    Performed by Susan Marcial Nose: Nose normal.   Mouth/Throat: Oropharynx is clear and moist.   Eyes: Pupils are equal, round, and reactive to light. Conjunctivae, EOM and lids are normal. Lids are everted and swept, no foreign bodies found.    Slit lamp exam:       The right eye sh drop into the right eye 4 (four) times daily. , Normal, Disp-1 Bottle, R-0

## 2019-10-24 RX ORDER — CYCLOBENZAPRINE HCL 10 MG
TABLET ORAL
Qty: 90 TABLET | Refills: 0 | Status: SHIPPED | OUTPATIENT
Start: 2019-10-24 | End: 2019-11-21

## 2019-10-24 NOTE — TELEPHONE ENCOUNTER
Medication: CYCLOBENZAPRINE HCL 10 MG Oral Tab    Date of last refill: 9/24/19  [place]    Last office visit: 7/31/19  Due back to clinic per last office note:  Not indicated  Date next office visit scheduled:  None

## 2019-11-11 RX ORDER — CYCLOBENZAPRINE HCL 10 MG
TABLET ORAL
Qty: 90 TABLET | Refills: 0 | OUTPATIENT
Start: 2019-11-11

## 2019-11-22 RX ORDER — CYCLOBENZAPRINE HCL 10 MG
10 TABLET ORAL 3 TIMES DAILY PRN
Qty: 90 TABLET | Refills: 0 | Status: SHIPPED | OUTPATIENT
Start: 2019-11-22 | End: 2019-12-17

## 2019-11-22 NOTE — TELEPHONE ENCOUNTER
Medication: CYCLOBENZAPRINE HCL 10 MG Oral Tab    Date of last refill: 10/24/19  [place]    Last office visit: 7/31/19  Due back to clinic per last office note:  Not indicated  Date next office visit scheduled:  None

## 2019-12-02 RX ORDER — CYCLOBENZAPRINE HCL 10 MG
TABLET ORAL
Qty: 90 TABLET | Refills: 0 | OUTPATIENT
Start: 2019-12-02

## 2019-12-17 RX ORDER — CYCLOBENZAPRINE HCL 10 MG
TABLET ORAL
Qty: 90 TABLET | Refills: 0 | Status: SHIPPED | OUTPATIENT
Start: 2019-12-17 | End: 2020-01-22

## 2019-12-17 RX ORDER — DICLOFENAC SODIUM 75 MG/1
TABLET, DELAYED RELEASE ORAL
Qty: 60 TABLET | Refills: 0 | Status: SHIPPED | OUTPATIENT
Start: 2019-12-17 | End: 2020-01-15

## 2019-12-17 NOTE — TELEPHONE ENCOUNTER
Medication:     Cyclobenzaprine HCl 10 MG Oral Tab  Date of last refill: 11/22/19  [place]    and    Diclofenac Sodium 75 MG Oral Tab EC  Date of last refill: 11/25/19  [place]

## 2019-12-26 RX ORDER — CYCLOBENZAPRINE HCL 10 MG
TABLET ORAL
Qty: 90 TABLET | Refills: 0 | Status: CANCELLED | OUTPATIENT
Start: 2019-12-26

## 2019-12-26 RX ORDER — CYCLOBENZAPRINE HCL 10 MG
TABLET ORAL
Qty: 90 TABLET | Refills: 0 | OUTPATIENT
Start: 2019-12-26

## 2020-01-02 NOTE — TELEPHONE ENCOUNTER
pt wants to schedule appt; pt called to [facility] from 9/3/19; see TE 8/31/19. Pt in pain and would like to either [facility] or come to office for visit. Please see notes, minoo like pt also seeing other doc for pain mgmt.   Please advise

## 2020-01-03 NOTE — PROGRESS NOTES
Name: Kee Falcon   : 1980   DOS: 1/3/2020     Pain Clinic Follow Up Visit:   Kee Falcon is a 35year old female who presents for recheck of her chronic low back pain which is currently in the left buttock radiating down the leg to the acute distress. Neurologic: Alert, oriented, articulate. Patient lays on examination table throughout the visit with her left ankle crossed over the right knee. Psychiatric: Appropriate mood.   Back: Pain with flexion, extension, bilateral leaning and twis L5-S1 with  associated mild left foraminal and lateral recess stenosis. The protrusion abuts the left S1 nerve  root in the lateral recess but is not substantially compressive.     ASSESSMENT AND PLAN:   Lumbar radiculitis  (primary encounter diagnosis)

## 2020-01-06 ENCOUNTER — OFFICE VISIT (OUTPATIENT)
Dept: PAIN CLINIC | Facility: CLINIC | Age: 40
End: 2020-01-06
Payer: COMMERCIAL

## 2020-01-06 ENCOUNTER — TELEPHONE (OUTPATIENT)
Dept: PAIN CLINIC | Facility: CLINIC | Age: 40
End: 2020-01-06

## 2020-01-06 VITALS
OXYGEN SATURATION: 96 % | WEIGHT: 190 LBS | HEIGHT: 63 IN | SYSTOLIC BLOOD PRESSURE: 118 MMHG | BODY MASS INDEX: 33.66 KG/M2 | DIASTOLIC BLOOD PRESSURE: 79 MMHG | HEART RATE: 78 BPM

## 2020-01-06 DIAGNOSIS — M54.16 LUMBAR RADICULITIS: Primary | ICD-10-CM

## 2020-01-06 PROCEDURE — 99213 OFFICE O/P EST LOW 20 MIN: CPT | Performed by: NURSE PRACTITIONER

## 2020-01-06 RX ORDER — PRAZOSIN HYDROCHLORIDE 2 MG/1
CAPSULE ORAL
COMMUNITY
Start: 2019-12-19 | End: 2021-02-22 | Stop reason: CLARIF

## 2020-01-06 RX ORDER — ALPRAZOLAM 2 MG/1
TABLET, EXTENDED RELEASE ORAL
COMMUNITY
Start: 2019-12-26 | End: 2020-12-11

## 2020-01-06 RX ORDER — ESZOPICLONE 3 MG/1
TABLET, FILM COATED ORAL
COMMUNITY
Start: 2019-12-29 | End: 2020-08-26 | Stop reason: ALTCHOICE

## 2020-01-06 RX ORDER — CLONAZEPAM 2 MG/1
TABLET ORAL
Refills: 5 | COMMUNITY
Start: 2019-12-07 | End: 2021-02-22 | Stop reason: CLARIF

## 2020-01-06 NOTE — PROGRESS NOTES
Patient presents in office today with reported pain in left piriformis down through buttock down to the knee. Patient denies numbness and tingling. Patient reports muscle cramping which increases the pain.     Current pain level reported = 8/10    Last repo

## 2020-01-06 NOTE — PATIENT INSTRUCTIONS
Refill policies:    • Allow 2-3 business days for refills; controlled substances may take longer.   • Contact your pharmacy at least 5 days prior to running out of medication and have them send an electronic request or submit request through the “request re Depending on your insurance carrier, approval may take 3-10 days. It is highly recommended patients contact their insurance carrier directly to determine coverage.   If test is done without insurance authorization, patient may be responsible for the entire TBD  Check-In Time: TBD    ** TO AVOID CANCELLATION AND/OR RESCHEDULING: PLEASE CALL [facility] PRE-PROCEDURE LINE -[phone] FOR DETAILED INSTRUCTIONS FIVE TO SEVEN DAYS PRIOR TO PROCEDURE**        Prior to the procedure:  Please update us prior to the proc medications:  • Aggrenox 10 days   • Agrylin (Anagrelide) 10 days  • Brilinta (Ticagrelor) 7 days  • Imbruvica (Ibrutinib) 3 days   • Enbrel (Etanercept) 24 hours   • Fragmin (Dalteparin) 24 hours   • Pletal (Cilostazol) 7 days  • Effient (Prasugrel) 7 day [phone]. If you are a diabetic, please increase the frequency of your glucose monitoring after the procedure as this may cause a temporary increase in your blood sugar.   Contact your primary care physician if your blood sugar rises as you may require

## 2020-01-06 NOTE — TELEPHONE ENCOUNTER
Medical clearance needed- NO     Implanted cardiac device/SCS/PNS: n/a     Pt seen in OV today by Dr ISAAK DIAS  and recommended for Left TLESI (X 1). Please begin PA process for procedure(s).      TLESI  Laterality: Left   Level(s): L5-S1, S1-S2    Pt informed

## 2020-01-13 NOTE — TELEPHONE ENCOUNTER
Prior authorization request completed for: Left TLESI  Authorization #No Prior Authorization is Required/Covered Benefit   Spoke with Eusebia Cronin at [facility]  Reference #:[id number]    Time:45:30    Authorization dates: N/A   CPT codes approved: 80163,45605

## 2020-01-14 NOTE — TELEPHONE ENCOUNTER
Spoke to pt to schedule procedure for 1/16/2020, case time 0830. Reviewed pre-procedure instructions for sedation including diclofenac and ibuprofen hold, NPO status, check in location, and changes in health status.  Pt verbalized understanding of all discu

## 2020-01-15 ENCOUNTER — HOSPITAL ENCOUNTER (EMERGENCY)
Facility: HOSPITAL | Age: 40
Discharge: HOME | End: 2020-01-15
Attending: EMERGENCY MEDICINE
Payer: COMMERCIAL

## 2020-01-15 ENCOUNTER — APPOINTMENT (OUTPATIENT)
Dept: GENERAL RADIOLOGY | Facility: HOSPITAL | Age: 40
End: 2020-01-15
Attending: EMERGENCY MEDICINE
Payer: COMMERCIAL

## 2020-01-15 VITALS
DIASTOLIC BLOOD PRESSURE: 69 MMHG | SYSTOLIC BLOOD PRESSURE: 102 MMHG | WEIGHT: 195 LBS | BODY MASS INDEX: 34.55 KG/M2 | OXYGEN SATURATION: 97 % | RESPIRATION RATE: 18 BRPM | HEIGHT: 63 IN | HEART RATE: 89 BPM

## 2020-01-15 DIAGNOSIS — S92.532A CLOSED DISPLACED FRACTURE OF DISTAL PHALANX OF LESSER TOE OF LEFT FOOT, INITIAL ENCOUNTER: ICD-10-CM

## 2020-01-15 DIAGNOSIS — S90.32XA CONTUSION OF LEFT FOOT, INITIAL ENCOUNTER: Primary | ICD-10-CM

## 2020-01-15 PROCEDURE — 28510 TREATMENT OF TOE FRACTURE: CPT

## 2020-01-15 PROCEDURE — 99284 EMERGENCY DEPT VISIT MOD MDM: CPT

## 2020-01-15 PROCEDURE — 99283 EMERGENCY DEPT VISIT LOW MDM: CPT

## 2020-01-15 PROCEDURE — 73630 X-RAY EXAM OF FOOT: CPT | Performed by: EMERGENCY MEDICINE

## 2020-01-15 PROCEDURE — 73660 X-RAY EXAM OF TOE(S): CPT | Performed by: EMERGENCY MEDICINE

## 2020-01-15 RX ORDER — TRAMADOL HYDROCHLORIDE 50 MG/1
100 TABLET ORAL ONCE
Status: DISCONTINUED | OUTPATIENT
Start: 2020-01-15 | End: 2020-01-15

## 2020-01-15 RX ORDER — DICLOFENAC SODIUM 75 MG/1
TABLET, DELAYED RELEASE ORAL
Qty: 60 TABLET | Refills: 0 | Status: SHIPPED | OUTPATIENT
Start: 2020-01-15 | End: 2020-02-14

## 2020-01-15 NOTE — ED INITIAL ASSESSMENT (HPI)
Pt states dropped a kettle ball on her left foot yesterday, ecchymosis and swelling noted to left foot and left third toe, pt states she took 2mg xanax pta

## 2020-01-15 NOTE — ED NOTES
Pt refused tramadol, states makes her migraines worse, requesting narcotics, advised pt we donot prescribe narcotics, pt is to see her pain dr in the am, Dr Khadijah Banda made aware

## 2020-01-15 NOTE — TELEPHONE ENCOUNTER
Medication: DICLOFENAC SODIUM 75 MG Oral Tab EC    Date of last refill: 12/17/19  [place]    Last office visit: 01/06/20  Due back to clinic per last office note:  Return in about 3 weeks (around 1/27/2020).   Date ne

## 2020-01-15 NOTE — ED PROVIDER NOTES
Patient Seen in: [facility] Emergency Department      History   Patient presents with:  Lower Extremity Injury    Stated Complaint: left foot injury, dropped weight on it yesterday    HPI    50-year-old female presents to the emergency department af OTHER      Mass in the left arm   • OTHER SURGICAL HISTORY      right ankle surg x 3 times - no metal   • OTHER SURGICAL HISTORY      EGD x 5 times   • OTHER SURGICAL HISTORY      bile duct stent   • OTHER SURGICAL HISTORY  08/16/16    left elbow cyst   • Stan Garrison MD at [facility] MAIN OR   • TRANSFORAMINAL EPIDURAL - LUMBAR N/A 7/22/2019    Performed by Augusto Wong MD at [facility]   • TRANSFORAMINAL EPIDURAL - LUMBAR Left 6/4/2018    Performed by Augusto Wong MD at [address] [place] [address]  dorsum of the foot, pain out of proportion to light touch but good capillary refill good pulses throughout   Skin:     General: Skin is warm. Capillary Refill: Capillary refill takes less than 2 seconds.    Neurological:      General: No focal deficit but no evidence of any other fracture of the foot. She was offered ice and refused stating that she \"is having a fibromyalgia flare\" she was given an orthopedic shoe. Patient does have chronic pain medications that she takes.   She is advised to ice as

## 2020-01-16 ENCOUNTER — HOSPITAL ENCOUNTER (OUTPATIENT)
Facility: HOSPITAL | Age: 40
Setting detail: HOSPITAL OUTPATIENT SURGERY
Discharge: HOME | End: 2020-01-16
Attending: ANESTHESIOLOGY | Admitting: ANESTHESIOLOGY
Payer: COMMERCIAL

## 2020-01-16 ENCOUNTER — TELEPHONE (OUTPATIENT)
Dept: PAIN CLINIC | Facility: CLINIC | Age: 40
End: 2020-01-16

## 2020-01-16 ENCOUNTER — APPOINTMENT (OUTPATIENT)
Dept: GENERAL RADIOLOGY | Facility: HOSPITAL | Age: 40
End: 2020-01-16
Attending: ANESTHESIOLOGY
Payer: COMMERCIAL

## 2020-01-16 VITALS
DIASTOLIC BLOOD PRESSURE: 96 MMHG | RESPIRATION RATE: 19 BRPM | TEMPERATURE: 99 F | HEART RATE: 97 BPM | OXYGEN SATURATION: 99 % | SYSTOLIC BLOOD PRESSURE: 133 MMHG

## 2020-01-16 DIAGNOSIS — M54.16 LUMBAR RADICULITIS: ICD-10-CM

## 2020-01-16 LAB
POCT LOT NUMBER: NORMAL
POCT URINE PREGNANCY: NEGATIVE

## 2020-01-16 PROCEDURE — 99152 MOD SED SAME PHYS/QHP 5/>YRS: CPT | Performed by: ANESTHESIOLOGY

## 2020-01-16 PROCEDURE — 81025 URINE PREGNANCY TEST: CPT | Performed by: ANESTHESIOLOGY

## 2020-01-16 RX ORDER — SODIUM CHLORIDE, SODIUM LACTATE, POTASSIUM CHLORIDE, CALCIUM CHLORIDE 600; 310; 30; 20 MG/100ML; MG/100ML; MG/100ML; MG/100ML
100 INJECTION, SOLUTION INTRAVENOUS CONTINUOUS
Status: DISCONTINUED | OUTPATIENT
Start: 2020-01-16 | End: 2020-01-16

## 2020-01-16 RX ORDER — ONDANSETRON 2 MG/ML
4 INJECTION INTRAMUSCULAR; INTRAVENOUS ONCE AS NEEDED
Status: DISCONTINUED | OUTPATIENT
Start: 2020-01-16 | End: 2020-01-16

## 2020-01-16 NOTE — TELEPHONE ENCOUNTER
Injection was cancelled today due to left toe fracture two days ago. Dr. Migel Knox saw patient in the preop area and discussed risk of injection with patient and concern for infection.  We have given patient contact information for Dr. Jeffy Purdy in podiatry for fu

## 2020-01-16 NOTE — H&P
History & Physical Examination    Patient Name: Cyndi Ca  MRN: [id number]  CSN: [id number]  YOB: 1980    Pre-Operative Diagnosis:  Lumbar radiculitis [M54.16]    Present Illness: A 38year old female with low back pain is here for lef Diagnosis Date   • Anxiety state, unspecified     also PTSD   • Asthma    • Depression    • Extrinsic asthma, unspecified    • Fibromyalgia    • Migraines    • Neuropathy     legs   • Pain    • Pancreatitis 2011   • Pancreatitis    • Reflux    • Sleep ap 2/17/2015    Performed by Remington Cottrell MD at [facility] MAIN OR   • [address] Right 8/5/2014    Performed by Remington Cottrell MD at [facility] MAIN OR   • [address] Left 7/24/2014    Performed by Vesna Mae Disease Neg    • Stroke Neg      Social History    Tobacco Use      Smoking status: Current Every Day Smoker        Packs/day: 0.50        Years: 21.00        Pack years: 10.5        Types: Cigarettes      Smokeless tobacco: Never Used    Alcohol use:  No

## 2020-01-23 RX ORDER — CYCLOBENZAPRINE HCL 10 MG
10 TABLET ORAL 3 TIMES DAILY PRN
Qty: 90 TABLET | Refills: 0 | Status: SHIPPED | OUTPATIENT
Start: 2020-01-23 | End: 2020-02-17

## 2020-01-23 NOTE — TELEPHONE ENCOUNTER
Medication: CYCLOBENZAPRINE 10 MG Oral Tab    Date of last refill: 12/17/19  [place]    Last office visit: 01/06/20  Due back to clinic per last office note: Return in about 3 weeks (around 1/27/2020).   Date next off

## 2020-02-14 RX ORDER — DICLOFENAC SODIUM 75 MG/1
TABLET, DELAYED RELEASE ORAL
Qty: 60 TABLET | Refills: 2 | Status: SHIPPED | OUTPATIENT
Start: 2020-02-14 | End: 2020-04-13

## 2020-02-17 RX ORDER — CYCLOBENZAPRINE HCL 10 MG
TABLET ORAL
Qty: 90 TABLET | Refills: 0 | Status: SHIPPED | OUTPATIENT
Start: 2020-02-17 | End: 2020-03-13

## 2020-02-17 NOTE — TELEPHONE ENCOUNTER
Medication: cyclobenzaprine 10 MG Oral Tab    Date of last refill: 01/23/20  [place]    Last office visit: 01/06/20  Due back to clinic per last office note: Return in about 3 weeks (around 1/27/2020).   Date next off

## 2020-03-02 ENCOUNTER — TELEPHONE (OUTPATIENT)
Dept: SURGERY | Facility: CLINIC | Age: 40
End: 2020-03-02

## 2020-03-02 NOTE — TELEPHONE ENCOUNTER
Request for records from 11/01/18 to present received, original sent to [employer], copy sent to scanning

## 2020-03-13 RX ORDER — CYCLOBENZAPRINE HCL 10 MG
TABLET ORAL
Qty: 90 TABLET | Refills: 0 | Status: SHIPPED | OUTPATIENT
Start: 2020-03-13 | End: 2020-04-13

## 2020-03-13 NOTE — TELEPHONE ENCOUNTER
Medication: CYCLOBENZAPRINE 10 MG Oral Tab    Date of last refill: 02/17/20  [facility] ([facility] N/A    Last office visit: 01/06/20  Due back to clinic per last office note: Return in about 3 weeks (around 1/27/2020)  Date next [facility]

## 2020-03-23 ENCOUNTER — TELEPHONE (OUTPATIENT)
Dept: SCHEDULING | Age: 40
End: 2020-03-23

## 2020-04-13 RX ORDER — CYCLOBENZAPRINE HCL 10 MG
10 TABLET ORAL 3 TIMES DAILY PRN
Qty: 90 TABLET | Refills: 0 | Status: SHIPPED | OUTPATIENT
Start: 2020-04-13 | End: 2020-05-12

## 2020-04-13 RX ORDER — DICLOFENAC SODIUM 75 MG/1
75 TABLET, DELAYED RELEASE ORAL 2 TIMES DAILY
Qty: 60 TABLET | Refills: 2 | Status: SHIPPED | OUTPATIENT
Start: 2020-04-13 | End: 2020-08-10

## 2020-04-15 RX ORDER — CYCLOBENZAPRINE HCL 10 MG
TABLET ORAL
Qty: 90 TABLET | Refills: 0 | OUTPATIENT
Start: 2020-04-15

## 2020-05-08 DIAGNOSIS — M54.16 LUMBAR RADICULITIS: Primary | ICD-10-CM

## 2020-05-08 DIAGNOSIS — M46.1 SACROILIITIS (HCC): ICD-10-CM

## 2020-05-08 DIAGNOSIS — M47.816 LUMBAR FACET ARTHROPATHY: ICD-10-CM

## 2020-05-12 RX ORDER — CYCLOBENZAPRINE HCL 10 MG
TABLET ORAL
Qty: 90 TABLET | Refills: 0 | Status: SHIPPED | OUTPATIENT
Start: 2020-05-12 | End: 2020-06-11

## 2020-06-11 DIAGNOSIS — M47.816 LUMBAR FACET ARTHROPATHY: ICD-10-CM

## 2020-06-11 DIAGNOSIS — M46.1 SACROILIITIS (HCC): ICD-10-CM

## 2020-06-11 DIAGNOSIS — M54.16 LUMBAR RADICULITIS: ICD-10-CM

## 2020-06-11 RX ORDER — CYCLOBENZAPRINE HCL 10 MG
10 TABLET ORAL 3 TIMES DAILY PRN
Qty: 90 TABLET | Refills: 0 | Status: SHIPPED | OUTPATIENT
Start: 2020-06-11 | End: 2020-07-14

## 2020-06-21 ENCOUNTER — APPOINTMENT (OUTPATIENT)
Dept: GENERAL RADIOLOGY | Facility: HOSPITAL | Age: 40
End: 2020-06-21
Attending: EMERGENCY MEDICINE
Payer: COMMERCIAL

## 2020-06-21 ENCOUNTER — APPOINTMENT (OUTPATIENT)
Dept: CT IMAGING | Facility: HOSPITAL | Age: 40
End: 2020-06-21
Attending: EMERGENCY MEDICINE
Payer: COMMERCIAL

## 2020-06-21 ENCOUNTER — HOSPITAL ENCOUNTER (EMERGENCY)
Facility: HOSPITAL | Age: 40
Discharge: HOME | End: 2020-06-21
Attending: EMERGENCY MEDICINE
Payer: COMMERCIAL

## 2020-06-21 VITALS
RESPIRATION RATE: 20 BRPM | OXYGEN SATURATION: 98 % | WEIGHT: 195.13 LBS | DIASTOLIC BLOOD PRESSURE: 64 MMHG | BODY MASS INDEX: 35 KG/M2 | TEMPERATURE: 97 F | HEART RATE: 95 BPM | SYSTOLIC BLOOD PRESSURE: 109 MMHG

## 2020-06-21 DIAGNOSIS — D64.9 ANEMIA, UNSPECIFIED TYPE: ICD-10-CM

## 2020-06-21 DIAGNOSIS — R52 TOTAL BODY PAIN: Primary | ICD-10-CM

## 2020-06-21 DIAGNOSIS — G43.809 OTHER MIGRAINE WITHOUT STATUS MIGRAINOSUS, NOT INTRACTABLE: ICD-10-CM

## 2020-06-21 PROCEDURE — 80053 COMPREHEN METABOLIC PANEL: CPT | Performed by: EMERGENCY MEDICINE

## 2020-06-21 PROCEDURE — 96374 THER/PROPH/DIAG INJ IV PUSH: CPT

## 2020-06-21 PROCEDURE — 96361 HYDRATE IV INFUSION ADD-ON: CPT

## 2020-06-21 PROCEDURE — 81003 URINALYSIS AUTO W/O SCOPE: CPT | Performed by: EMERGENCY MEDICINE

## 2020-06-21 PROCEDURE — 96375 TX/PRO/DX INJ NEW DRUG ADDON: CPT

## 2020-06-21 PROCEDURE — 83735 ASSAY OF MAGNESIUM: CPT | Performed by: EMERGENCY MEDICINE

## 2020-06-21 PROCEDURE — 71045 X-RAY EXAM CHEST 1 VIEW: CPT | Performed by: EMERGENCY MEDICINE

## 2020-06-21 PROCEDURE — 70450 CT HEAD/BRAIN W/O DYE: CPT | Performed by: EMERGENCY MEDICINE

## 2020-06-21 PROCEDURE — 85025 COMPLETE CBC W/AUTO DIFF WBC: CPT | Performed by: EMERGENCY MEDICINE

## 2020-06-21 PROCEDURE — 99285 EMERGENCY DEPT VISIT HI MDM: CPT

## 2020-06-21 RX ORDER — DIPHENHYDRAMINE HYDROCHLORIDE 50 MG/ML
25 INJECTION INTRAMUSCULAR; INTRAVENOUS ONCE
Status: COMPLETED | OUTPATIENT
Start: 2020-06-21 | End: 2020-06-21

## 2020-06-21 RX ORDER — ONDANSETRON 2 MG/ML
4 INJECTION INTRAMUSCULAR; INTRAVENOUS ONCE
Status: COMPLETED | OUTPATIENT
Start: 2020-06-21 | End: 2020-06-21

## 2020-06-21 RX ORDER — KETOROLAC TROMETHAMINE 30 MG/ML
15 INJECTION, SOLUTION INTRAMUSCULAR; INTRAVENOUS ONCE
Status: COMPLETED | OUTPATIENT
Start: 2020-06-21 | End: 2020-06-21

## 2020-06-21 RX ORDER — DEXAMETHASONE SODIUM PHOSPHATE 4 MG/ML
10 VIAL (ML) INJECTION ONCE
Status: COMPLETED | OUTPATIENT
Start: 2020-06-21 | End: 2020-06-21

## 2020-06-21 NOTE — ED NOTES
Pt appears to answer questions clearly with good eye contact, no squinting, able to tolerate light and denies dizziness.  However, when reporting her discomfort, patient actively squints, points to old wounds, and continuously adds symptoms throughout exten

## 2020-06-21 NOTE — ED NOTES
Last OV 8/20/2019    Patient completely out.      Health Maintenance   Topic Date Due    Varicella Vaccine (1 of 2 - 13+ 2-dose series) 04/30/1994    HIV screen  04/30/1996    Flu vaccine (1) 09/01/2019    Potassium monitoring  09/10/2019    Creatinine monitoring  09/10/2019    DTaP/Tdap/Td vaccine (1 - Tdap) 08/20/2020 (Originally 4/30/2000)    Pneumococcal 0-64 years Vaccine  Aged Out             (applicable per patient's age: Cancer Screenings, Depression Screening, Fall Risk Screening, Immunizations)    No results found for: LABA1C, LABMICR, LDLCHOLESTEROL, LDLCALC, AST, ALT, BUN   (goal A1C is < 7)   (goal LDL is <100) need 30-50% reduction from baseline     BP Readings from Last 3 Encounters:   08/20/19 120/74   06/12/19 (!) 142/90    (goal /80)      All Future Testing planned in [facility]:      Next Visit Date:  Future Appointments   Date Time Provider Carmela Lauren   11/20/2019  3:20 PM Evan Driscoll MD [place]  [facility]            Patient Active Problem List:     Essential hypertension This RN to bedside where it is noted that blood is on the gown of patient. approx 30ml of blood on her gown. This RN assessed patient and found a 2in \"cut\" hairline cut to the patient's left temple. Cut appears to have been made by a fingernail.  When ask

## 2020-06-21 NOTE — ED NOTES
Pt assisted to restroom, steady, instructed to pull cord in restroom when finished and to not try to get up on her own. Pt states she understands.

## 2020-06-21 NOTE — ED PROVIDER NOTES
Patient Seen in: [facility] Emergency Department      History   Patient presents with:  Headache    Stated Complaint: headache x couple weeks     HPI    53-year-old woman presents to the emergency department stating that she has had a global headach bleeds. Patient denies any fevers or chills, she denies any nausea vomiting or diarrhea she states that she came to the emergency department for treatment of her headache and body pain and general wobbliness.   Past Medical History:   Diagnosis Date   • An SACROILIAC JOINT Left 9/25/2017    Performed by Pau Cuellar MD at [facility] MAIN OR   • [address] Left 9/8/2016    Performed by Pau Cuellar MD at [facility] MAIN OR   • [address] Left 2/17/20 History    Tobacco Use      Smoking status: Current Every Day Smoker        Packs/day: 0.50        Years: 21.00        Pack years: 10.5        Types: Cigarettes      Smokeless tobacco: Never Used    Alcohol use: No      Comment: rare    Drug use:  No third posterior digit as well. This appears to be a chronic wound.   Her bilateral lower extremities she has an area on her her right leg that she has scratched until it is bleeding her anterior chest wall has piercings but otherwise atraumatic no further were used. Dose information is transmitted to the [facility]     [facility] of Radiology) NRDR ([address])     which includes the Dose Index     Registry.          PATIENT STATED HISTORY: (As transcribed by Technologist)  Patient with Migraine cocktail initiated with Toradol, Benadryl, Decadron, Zofran and IV fluids. Patient does have a pain management physician with whom she follows up, narcotics IV are not recommended in the emergency department for this patient.   She tells me that s unspecified type    Disposition:  Discharge  6/21/2020  4:11 pm    Follow-up:  Yudy Kumar MD  [address] [place] [address]  [phone]    Schedule an appointment as soon as possible for a visit      Anjel Campa [address] [place] [address]

## 2020-06-21 NOTE — ED NOTES
This RN to bedside with MD. Pt found on ground with head nestled in the open window on the ledge. Pt promptly able to awaken and get up when encouraged by this RN. Pt walked back to bed and no obvious injuries noted. Pt a/o x4.  Head completely assessed by

## 2020-06-21 NOTE — ED INITIAL ASSESSMENT (HPI)
Pt to ed with rpts of 8/10 migraine pain. Also rpts throat pain and \"horrific fibromyalgia pain, flare up. \" Pt states she has been falling \"a lot lately. \" Pt has poorly healing left hand lac that has opened multiple times.

## 2020-06-21 NOTE — ED NOTES
This RN ambulated patient from [facility] to . Patient walked with steady gait throughout halls and into room. Patient asked, Zander Ann do you always put me in the psych room? \" RN explained to patient that this room was a multipurpose room and that we are able to t

## 2020-07-06 ENCOUNTER — TELEPHONE (OUTPATIENT)
Dept: PAIN CLINIC | Facility: CLINIC | Age: 40
End: 2020-07-06

## 2020-07-06 RX ORDER — DICLOFENAC SODIUM 75 MG/1
TABLET, DELAYED RELEASE ORAL
Qty: 60 TABLET | Refills: 2 | OUTPATIENT
Start: 2020-07-06

## 2020-07-06 NOTE — TELEPHONE ENCOUNTER
Cyclobenzaprine is denied at this time. She will need to have an office visit to discuss medications prior to additional refills of any medications for pain.    Thanks

## 2020-07-13 DIAGNOSIS — M54.16 LUMBAR RADICULITIS: ICD-10-CM

## 2020-07-13 DIAGNOSIS — M46.1 SACROILIITIS (HCC): ICD-10-CM

## 2020-07-13 DIAGNOSIS — M47.816 LUMBAR FACET ARTHROPATHY: ICD-10-CM

## 2020-07-14 RX ORDER — CYCLOBENZAPRINE HCL 10 MG
10 TABLET ORAL 3 TIMES DAILY PRN
Qty: 90 TABLET | Refills: 0 | Status: SHIPPED | OUTPATIENT
Start: 2020-07-14 | End: 2020-08-10

## 2020-08-06 PROBLEM — D25.1 INTRAMURAL LEIOMYOMA OF UTERUS: Status: ACTIVE | Noted: 2020-08-06

## 2020-08-06 PROCEDURE — 87624 HPV HI-RISK TYP POOLED RSLT: CPT | Performed by: OBSTETRICS & GYNECOLOGY

## 2020-08-06 PROCEDURE — 88175 CYTOPATH C/V AUTO FLUID REDO: CPT | Performed by: OBSTETRICS & GYNECOLOGY

## 2020-08-10 DIAGNOSIS — M54.16 LUMBAR RADICULITIS: ICD-10-CM

## 2020-08-10 DIAGNOSIS — M47.816 LUMBAR FACET ARTHROPATHY: ICD-10-CM

## 2020-08-10 DIAGNOSIS — M46.1 SACROILIITIS (HCC): ICD-10-CM

## 2020-08-10 RX ORDER — DICLOFENAC SODIUM 75 MG/1
TABLET, DELAYED RELEASE ORAL
Qty: 60 TABLET | Refills: 2 | Status: SHIPPED | OUTPATIENT
Start: 2020-08-10 | End: 2020-09-28

## 2020-08-10 RX ORDER — CYCLOBENZAPRINE HCL 10 MG
10 TABLET ORAL 3 TIMES DAILY PRN
Qty: 90 TABLET | Refills: 0 | Status: SHIPPED | OUTPATIENT
Start: 2020-08-10 | End: 2020-09-09

## 2020-08-10 NOTE — TELEPHONE ENCOUNTER
Medication: cyclobenzaprine 10 MG Oral Tab    Date of last refill: 07/14/2020  [place]    Last office visit: 01/06/2020  Due back to clinic per last office note:  Return in about 3 weeks (around 1/27/2020).   Date nex

## 2020-08-10 NOTE — TELEPHONE ENCOUNTER
Medication: Diclofenac Sodium 75 MG Oral Tab EC    Date of last refill: 04/13/2020  [place]    Last office visit: 01/06/2020  Due back to clinic per last office note:  Return in about 3 weeks (around 1/27/2020)  Date

## 2020-08-26 ENCOUNTER — HOSPITAL ENCOUNTER (OUTPATIENT)
Age: 40
Discharge: HOME | End: 2020-08-26
Payer: COMMERCIAL

## 2020-08-26 VITALS
RESPIRATION RATE: 18 BRPM | BODY MASS INDEX: 42.17 KG/M2 | OXYGEN SATURATION: 98 % | WEIGHT: 238 LBS | DIASTOLIC BLOOD PRESSURE: 74 MMHG | SYSTOLIC BLOOD PRESSURE: 123 MMHG | HEIGHT: 63 IN | HEART RATE: 85 BPM | TEMPERATURE: 99 F

## 2020-08-26 DIAGNOSIS — L25.9 CONTACT DERMATITIS, UNSPECIFIED CONTACT DERMATITIS TYPE, UNSPECIFIED TRIGGER: ICD-10-CM

## 2020-08-26 DIAGNOSIS — H66.91 RIGHT OTITIS MEDIA, UNSPECIFIED OTITIS MEDIA TYPE: Primary | ICD-10-CM

## 2020-08-26 PROCEDURE — 87086 URINE CULTURE/COLONY COUNT: CPT | Performed by: NURSE PRACTITIONER

## 2020-08-26 PROCEDURE — 99213 OFFICE O/P EST LOW 20 MIN: CPT

## 2020-08-26 PROCEDURE — 99214 OFFICE O/P EST MOD 30 MIN: CPT

## 2020-08-26 RX ORDER — AMOXICILLIN AND CLAVULANATE POTASSIUM 875; 125 MG/1; MG/1
1 TABLET, FILM COATED ORAL 2 TIMES DAILY
Qty: 20 TABLET | Refills: 0 | Status: SHIPPED | OUTPATIENT
Start: 2020-08-26 | End: 2020-09-05

## 2020-08-27 NOTE — ED PROVIDER NOTES
Patient Seen in: [facility]      History   Patient presents with:  Ear Problem Pain  Derm-other    Stated Complaint: kateryna ear pain, kateryna feet & leg rash    HPI  37 yo female presents with bilateral ear pain for weeks.   She has a h x 3 times - no metal   • OTHER SURGICAL HISTORY      EGD x 5 times   • OTHER SURGICAL HISTORY      bile duct stent   • OTHER SURGICAL HISTORY  08/16/16    left elbow cyst   • RADIOFREQUENCY ABLATION OF SACROILIAC JOINT Right 10/2/2017    Performed by Rosita Santos, Performed by Aldo Adhikari MD at [address]   • TRANSFORAMINAL EPIDURAL - LUMBAR Left 6/4/2018    Performed by Aldo Adhikari MD at [facility] Right 2/17/2015    Performed by Rosaline Odom, muscular tenderness. Cardiovascular:      Rate and Rhythm: Normal rate and regular rhythm. Heart sounds: Normal heart sounds. Pulmonary:      Effort: Pulmonary effort is normal.      Breath sounds: Normal breath sounds.    Skin:     General: Skin i

## 2020-08-27 NOTE — ED INITIAL ASSESSMENT (HPI)
Bilateral ear pain off and on for a couple of weeks per pt. Trying olive oil and cleaning.   Rash to lower legs and feet for 2 weeks - states is itchy  Using steroid cream per pharmacist  Tiny pin dot rash to lower legs and feet Farhana Mejia in for a follow-up for history of NICM with recovery. Patient with history of Takotsubo 01/2016 and in 11/2017 AF with RVR complicated with cardiogenic shock. Patient with EF recovery. Echo today reviewed by MD with stable, normal EF. Patient with persistent AFib/Aflutter and has BIV ICD managed by Dr Jacques. Medications reviewed and teaching completed.  Farhana Mejia to continue current medications.  Patient to RTC in 1 year(s) with clinic visit. No echo unless patient has clinical change. No labs, patient followed by nephrology with labs checked every 4 months. .

## 2020-08-28 NOTE — ED NOTES
Patient called stating ears like its closing, in pain and its worse when eating. Taking Tylenol and Ibuprofen with no relief. Requesting numbing drops for the ears.     Discussed with NP Cain Woods, instructions given to follow-up with ENT, take Tylen

## 2020-09-08 DIAGNOSIS — M54.16 LUMBAR RADICULITIS: ICD-10-CM

## 2020-09-08 DIAGNOSIS — M46.1 SACROILIITIS (HCC): ICD-10-CM

## 2020-09-08 DIAGNOSIS — M47.816 LUMBAR FACET ARTHROPATHY: ICD-10-CM

## 2020-09-08 NOTE — TELEPHONE ENCOUNTER
Medication: cyclobenzaprine 10 MG Oral Tab    Date of last refill: 8/10/2020  [place]    Last office visit: 01/06/2020  Due back to clinic per last office note:  Return in about 3 weeks (around 1/27/2020).   Date next

## 2020-09-09 RX ORDER — CYCLOBENZAPRINE HCL 10 MG
TABLET ORAL
Qty: 90 TABLET | Refills: 0 | Status: SHIPPED | OUTPATIENT
Start: 2020-09-09 | End: 2020-09-28

## 2020-09-28 DIAGNOSIS — M47.816 LUMBAR FACET ARTHROPATHY: ICD-10-CM

## 2020-09-28 DIAGNOSIS — M46.1 SACROILIITIS (HCC): ICD-10-CM

## 2020-09-28 DIAGNOSIS — M54.16 LUMBAR RADICULITIS: ICD-10-CM

## 2020-09-28 RX ORDER — CYCLOBENZAPRINE HCL 10 MG
10 TABLET ORAL 3 TIMES DAILY PRN
Qty: 90 TABLET | Refills: 0 | Status: SHIPPED | OUTPATIENT
Start: 2020-09-28 | End: 2020-11-09

## 2020-09-28 RX ORDER — DICLOFENAC SODIUM 75 MG/1
75 TABLET, DELAYED RELEASE ORAL 2 TIMES DAILY
Qty: 60 TABLET | Refills: 2 | Status: SHIPPED | OUTPATIENT
Start: 2020-09-28 | End: 2020-11-09

## 2020-09-28 NOTE — TELEPHONE ENCOUNTER
Medication: CYCLOBENZAPRINE 10 MG Oral Tab    Date of last refill: 9/9/2020  [place]       Last office visit: 01/06/2020  Due back to clinic per last office note:  Return in about 3 weeks (around 1/27/2020).   Date ne

## 2020-09-28 NOTE — TELEPHONE ENCOUNTER
Medication: DICLOFENAC SODIUM 75 MG Oral Tab EC    Date of last refill: 8/10/2020  [place]    Last office visit: 01/06/2020  Due back to clinic per last office note:  Return in about 3 weeks (around 1/27/2020).   Date

## 2020-10-06 DIAGNOSIS — M46.1 SACROILIITIS (HCC): ICD-10-CM

## 2020-10-06 DIAGNOSIS — M47.816 LUMBAR FACET ARTHROPATHY: ICD-10-CM

## 2020-10-06 DIAGNOSIS — M54.16 LUMBAR RADICULITIS: ICD-10-CM

## 2020-10-06 RX ORDER — CYCLOBENZAPRINE HCL 10 MG
TABLET ORAL
Qty: 90 TABLET | Refills: 0 | OUTPATIENT
Start: 2020-10-06

## 2020-10-10 ENCOUNTER — HOSPITAL ENCOUNTER (EMERGENCY)
Facility: HOSPITAL | Age: 40
Discharge: HOME | End: 2020-10-10
Attending: EMERGENCY MEDICINE
Payer: COMMERCIAL

## 2020-10-10 ENCOUNTER — APPOINTMENT (OUTPATIENT)
Dept: GENERAL RADIOLOGY | Facility: HOSPITAL | Age: 40
End: 2020-10-10
Attending: EMERGENCY MEDICINE
Payer: COMMERCIAL

## 2020-10-10 VITALS
SYSTOLIC BLOOD PRESSURE: 101 MMHG | HEIGHT: 63 IN | WEIGHT: 200 LBS | BODY MASS INDEX: 35.44 KG/M2 | TEMPERATURE: 99 F | DIASTOLIC BLOOD PRESSURE: 66 MMHG | HEART RATE: 85 BPM | RESPIRATION RATE: 18 BRPM | OXYGEN SATURATION: 96 %

## 2020-10-10 DIAGNOSIS — J06.9 UPPER RESPIRATORY TRACT INFECTION, UNSPECIFIED TYPE: Primary | ICD-10-CM

## 2020-10-10 PROCEDURE — 71045 X-RAY EXAM CHEST 1 VIEW: CPT | Performed by: EMERGENCY MEDICINE

## 2020-10-10 PROCEDURE — 81001 URINALYSIS AUTO W/SCOPE: CPT | Performed by: EMERGENCY MEDICINE

## 2020-10-10 PROCEDURE — 99283 EMERGENCY DEPT VISIT LOW MDM: CPT

## 2020-10-10 RX ORDER — METHYLPREDNISOLONE 4 MG/1
TABLET ORAL
Qty: 1 PACKAGE | Refills: 0 | Status: SHIPPED | OUTPATIENT
Start: 2020-10-10 | End: 2021-02-22 | Stop reason: CLARIF

## 2020-10-10 NOTE — ED PROVIDER NOTES
Patient Seen in: [facility] Emergency Department      History   Patient presents with:  Pain    Stated Complaint: Generalized pain, history of fibromyalgia. Denies trauma. Dry cough.  Eligio Raya     HPI    43-year-old female presents reporting generalize ARTHROSCOP,DIAGNOSTIC      left   • KNEE JOINT INJECTIONS UNDER FLUOROSCOPY BILATERAL Bilateral 3/1/2016    Performed by Waldo Brock MD at [facility] MAIN OR   • LUMBAR FACET INJECTION Bilateral 9/30/2014    Performed by Waldo Brock MD at [address] [place] [address] MAIN OR   • RADIOFREQUENCY ABLATION OF THORACIC OR LUMBAR MEDIAL BRANCH Left 12/9/2014    Performed by Holly Zimmer MD at [facility] MAIN OR   • REMOVAL GALLBLADDER     • SACROILIAC JOINT INJECTION BILATERAL Bilateral 6/10/2014    Performed by Matthew Guzman and clear. Coarse cough  Heart: regular rate rhythm and no murmur   Abdomen: Soft and nontender. No abdominal masses.   No peritoneal signs   Extremities: no edema, normal peripheral pulses   Neuro: Alert oriented and nonfocal   Skin: no rashes or nodules plan.              Disposition and Plan     Clinical Impression:  Upper respiratory tract infection, unspecified type  (primary encounter diagnosis)    Disposition:  Discharge  10/10/2020  7:20 pm    Follow-up:  Luma Ortiz MD  [address] [place] [address]

## 2020-10-10 NOTE — ED INITIAL ASSESSMENT (HPI)
Pt here for pain all over along with headache and sore throat and congestion along with a dry cough. Pt denies any n,v,d or fever.

## 2020-10-11 NOTE — ED NOTES
This RN went into pt room. Pt states she was in bathroom and fell and hit her head. Pt denies any pain. No injuries noted. EDMD made aware.

## 2020-11-07 DIAGNOSIS — M46.1 SACROILIITIS (HCC): ICD-10-CM

## 2020-11-07 DIAGNOSIS — M47.816 LUMBAR FACET ARTHROPATHY: ICD-10-CM

## 2020-11-07 DIAGNOSIS — M54.16 LUMBAR RADICULITIS: ICD-10-CM

## 2020-11-09 RX ORDER — CYCLOBENZAPRINE HCL 10 MG
TABLET ORAL
Qty: 90 TABLET | Refills: 0 | Status: SHIPPED | OUTPATIENT
Start: 2020-11-09 | End: 2020-12-07

## 2020-11-09 RX ORDER — DICLOFENAC SODIUM 75 MG/1
TABLET, DELAYED RELEASE ORAL
Qty: 60 TABLET | Refills: 2 | Status: SHIPPED | OUTPATIENT
Start: 2020-11-09 | End: 2021-02-22 | Stop reason: CLARIF

## 2020-11-09 NOTE — TELEPHONE ENCOUNTER
Medication: Diclofenac Sodium 75 MG Oral Tab EC, 09/28/2020  cyclobenzaprine 10 MG Oral Tab    Date of last refill: 09/28/2020  [place]    Last office visit: 01/06/2020  Due back to clinic per last office note:  Not

## 2020-12-01 ENCOUNTER — PATIENT MESSAGE (OUTPATIENT)
Dept: PAIN CLINIC | Facility: CLINIC | Age: 40
End: 2020-12-01

## 2020-12-01 NOTE — TELEPHONE ENCOUNTER
From: Maritza Newton  To: Tom Schrader MD  Sent: 12/1/2020 10:12 AM CST  Subject: Non-Urgent Medical Question    I am sorry to bother you but my back, hips and right ankle have been seriously painful for the past severely causing much pain.  Add in a

## 2020-12-07 DIAGNOSIS — M47.816 LUMBAR FACET ARTHROPATHY: ICD-10-CM

## 2020-12-07 DIAGNOSIS — M54.16 LUMBAR RADICULITIS: ICD-10-CM

## 2020-12-07 DIAGNOSIS — M46.1 SACROILIITIS (HCC): ICD-10-CM

## 2020-12-07 NOTE — TELEPHONE ENCOUNTER
Received a refill request for CYCLOBENZAPRINE 10 MG Oral Tab via fax from [phone]

## 2020-12-08 RX ORDER — CYCLOBENZAPRINE HCL 10 MG
10 TABLET ORAL 3 TIMES DAILY PRN
Qty: 90 TABLET | Refills: 0 | Status: SHIPPED | OUTPATIENT
Start: 2020-12-08 | End: 2021-01-06

## 2020-12-08 NOTE — TELEPHONE ENCOUNTER
Medication: CYCLOBENZAPRINE 10 MG Oral Tab     Date of last refill: 09/09/2020  [place]    Last office visit: 01/06/2020  Due back to clinic per last office note:  12/29/2020  Date next office visit scheduled:  12/29

## 2020-12-15 DIAGNOSIS — M46.1 SACROILIITIS (HCC): ICD-10-CM

## 2020-12-15 DIAGNOSIS — M54.16 LUMBAR RADICULITIS: ICD-10-CM

## 2020-12-15 DIAGNOSIS — M47.816 LUMBAR FACET ARTHROPATHY: ICD-10-CM

## 2020-12-15 RX ORDER — CYCLOBENZAPRINE HCL 10 MG
10 TABLET ORAL 3 TIMES DAILY PRN
Qty: 90 TABLET | Refills: 0 | OUTPATIENT
Start: 2020-12-15

## 2020-12-15 NOTE — TELEPHONE ENCOUNTER
Called the Pharmacy, regarding Pt's Cyclobenzaprine 10 MG Oral Tab, refill, pharmacist stated that  received an authorization for for the med 12/08/2020. prescription is ready to .    Mini

## 2020-12-16 DIAGNOSIS — M54.16 LUMBAR RADICULITIS: ICD-10-CM

## 2020-12-16 DIAGNOSIS — M47.816 LUMBAR FACET ARTHROPATHY: ICD-10-CM

## 2020-12-16 DIAGNOSIS — M46.1 SACROILIITIS (HCC): ICD-10-CM

## 2020-12-16 RX ORDER — CYCLOBENZAPRINE HCL 10 MG
10 TABLET ORAL 3 TIMES DAILY PRN
Qty: 90 TABLET | Refills: 0 | OUTPATIENT
Start: 2020-12-16

## 2020-12-18 ENCOUNTER — LAB ENCOUNTER (OUTPATIENT)
Dept: LAB | Age: 40
End: 2020-12-18
Attending: PHYSICAL THERAPIST
Payer: COMMERCIAL

## 2020-12-18 DIAGNOSIS — Z01.818 PREOP EXAMINATION: Primary | ICD-10-CM

## 2020-12-18 DIAGNOSIS — Z01.812 PRE-OPERATIVE LABORATORY EXAMINATION: ICD-10-CM

## 2020-12-18 PROCEDURE — 36415 COLL VENOUS BLD VENIPUNCTURE: CPT

## 2020-12-18 PROCEDURE — 85025 COMPLETE CBC W/AUTO DIFF WBC: CPT

## 2020-12-18 PROCEDURE — 80053 COMPREHEN METABOLIC PANEL: CPT

## 2021-01-06 DIAGNOSIS — M46.1 SACROILIITIS (HCC): ICD-10-CM

## 2021-01-06 DIAGNOSIS — M54.16 LUMBAR RADICULITIS: ICD-10-CM

## 2021-01-06 DIAGNOSIS — M47.816 LUMBAR FACET ARTHROPATHY: ICD-10-CM

## 2021-01-06 RX ORDER — CYCLOBENZAPRINE HCL 10 MG
10 TABLET ORAL 3 TIMES DAILY PRN
Qty: 90 TABLET | Refills: 0 | Status: SHIPPED | OUTPATIENT
Start: 2021-01-06 | End: 2021-01-30

## 2021-01-06 NOTE — TELEPHONE ENCOUNTER
Medication: Cyclobenzaprine 10 MG Oral Tab    Date of last refill: 12/8/20  [place]     Last office visit: 1/6/2020  Due back to clinic per last office note: Return in about 3 weeks (around 1/27/2020).   Date next off

## 2021-01-11 ENCOUNTER — TELEPHONE (OUTPATIENT)
Dept: PAIN CLINIC | Facility: CLINIC | Age: 41
End: 2021-01-11

## 2021-01-11 NOTE — TELEPHONE ENCOUNTER
Lmtcb to inform pt that she no-showed her 12/29 appt with Dr. Brandon Hawthorne and to see if she would like to reschedule that appt.

## 2021-01-30 DIAGNOSIS — M47.816 LUMBAR FACET ARTHROPATHY: ICD-10-CM

## 2021-01-30 DIAGNOSIS — M46.1 SACROILIITIS (HCC): ICD-10-CM

## 2021-01-30 DIAGNOSIS — M54.16 LUMBAR RADICULITIS: ICD-10-CM

## 2021-02-01 ENCOUNTER — HOSPITAL ENCOUNTER (EMERGENCY)
Facility: HOSPITAL | Age: 41
Discharge: HOME | End: 2021-02-01
Attending: EMERGENCY MEDICINE
Payer: COMMERCIAL

## 2021-02-01 VITALS
WEIGHT: 220 LBS | OXYGEN SATURATION: 97 % | HEIGHT: 63 IN | BODY MASS INDEX: 38.98 KG/M2 | RESPIRATION RATE: 18 BRPM | SYSTOLIC BLOOD PRESSURE: 99 MMHG | DIASTOLIC BLOOD PRESSURE: 77 MMHG | TEMPERATURE: 98 F | HEART RATE: 79 BPM

## 2021-02-01 DIAGNOSIS — R29.6 FREQUENT FALLS: ICD-10-CM

## 2021-02-01 DIAGNOSIS — R53.83 OTHER FATIGUE: Primary | ICD-10-CM

## 2021-02-01 LAB
ALBUMIN SERPL-MCNC: 3.6 G/DL (ref 3.4–5)
ALBUMIN/GLOB SERPL: 0.9 {RATIO} (ref 1–2)
ALP LIVER SERPL-CCNC: 138 U/L
ALT SERPL-CCNC: 40 U/L
ANION GAP SERPL CALC-SCNC: 6 MMOL/L (ref 0–18)
AST SERPL-CCNC: 30 U/L (ref 15–37)
BASOPHILS # BLD AUTO: 0.03 X10(3) UL (ref 0–0.2)
BASOPHILS NFR BLD AUTO: 0.4 %
BILIRUB SERPL-MCNC: 0.3 MG/DL (ref 0.1–2)
BILIRUB UR QL STRIP.AUTO: NEGATIVE
BUN BLD-MCNC: 12 MG/DL (ref 7–18)
BUN/CREAT SERPL: 13.3 (ref 10–20)
CALCIUM BLD-MCNC: 9.1 MG/DL (ref 8.5–10.1)
CHLORIDE SERPL-SCNC: 106 MMOL/L (ref 98–112)
CK SERPL-CCNC: 117 U/L
CO2 SERPL-SCNC: 26 MMOL/L (ref 21–32)
COLOR UR AUTO: YELLOW
CREAT BLD-MCNC: 0.9 MG/DL
DEPRECATED RDW RBC AUTO: 47.3 FL (ref 35.1–46.3)
EOSINOPHIL # BLD AUTO: 0.28 X10(3) UL (ref 0–0.7)
EOSINOPHIL NFR BLD AUTO: 3.7 %
ERYTHROCYTE [DISTWIDTH] IN BLOOD BY AUTOMATED COUNT: 14.7 % (ref 11–15)
ETHANOL SERPL-MCNC: <3 MG/DL (ref ?–3)
GLOBULIN PLAS-MCNC: 3.8 G/DL (ref 2.8–4.4)
GLUCOSE BLD-MCNC: 81 MG/DL (ref 70–99)
GLUCOSE UR STRIP.AUTO-MCNC: NEGATIVE MG/DL
HCT VFR BLD AUTO: 35.7 %
HGB BLD-MCNC: 11.4 G/DL
IMM GRANULOCYTES # BLD AUTO: 0.01 X10(3) UL (ref 0–1)
IMM GRANULOCYTES NFR BLD: 0.1 %
KETONES UR STRIP.AUTO-MCNC: NEGATIVE MG/DL
LIPASE SERPL-CCNC: 92 U/L (ref 73–393)
LYMPHOCYTES # BLD AUTO: 3.13 X10(3) UL (ref 1–4)
LYMPHOCYTES NFR BLD AUTO: 41.1 %
M PROTEIN MFR SERPL ELPH: 7.4 G/DL (ref 6.4–8.2)
MCH RBC QN AUTO: 27.8 PG (ref 26–34)
MCHC RBC AUTO-ENTMCNC: 31.9 G/DL (ref 31–37)
MCV RBC AUTO: 87.1 FL
MONOCYTES # BLD AUTO: 0.47 X10(3) UL (ref 0.1–1)
MONOCYTES NFR BLD AUTO: 6.2 %
NEUTROPHILS # BLD AUTO: 3.7 X10 (3) UL (ref 1.5–7.7)
NEUTROPHILS # BLD AUTO: 3.7 X10(3) UL (ref 1.5–7.7)
NEUTROPHILS NFR BLD AUTO: 48.5 %
NITRITE UR QL STRIP.AUTO: NEGATIVE
OSMOLALITY SERPL CALC.SUM OF ELEC: 285 MOSM/KG (ref 275–295)
PH UR STRIP.AUTO: 6 [PH] (ref 4.5–8)
PLATELET # BLD AUTO: 296 10(3)UL (ref 150–450)
POCT URINE PREGNANCY: NEGATIVE
POTASSIUM SERPL-SCNC: 4.1 MMOL/L (ref 3.5–5.1)
PROT UR STRIP.AUTO-MCNC: 30 MG/DL
RBC # BLD AUTO: 4.1 X10(6)UL
SARS-COV-2 RNA RESP QL NAA+PROBE: NOT DETECTED
SODIUM SERPL-SCNC: 138 MMOL/L (ref 136–145)
SP GR UR STRIP.AUTO: 1.01 (ref 1–1.03)
TROPONIN I SERPL-MCNC: <0.045 NG/ML (ref ?–0.04)
TSI SER-ACNC: 2.53 MIU/ML (ref 0.36–3.74)
UROBILINOGEN UR STRIP.AUTO-MCNC: <2 MG/DL
WBC # BLD AUTO: 7.6 X10(3) UL (ref 4–11)

## 2021-02-01 PROCEDURE — 82077 ASSAY SPEC XCP UR&BREATH IA: CPT | Performed by: EMERGENCY MEDICINE

## 2021-02-01 PROCEDURE — 81001 URINALYSIS AUTO W/SCOPE: CPT | Performed by: EMERGENCY MEDICINE

## 2021-02-01 PROCEDURE — 99283 EMERGENCY DEPT VISIT LOW MDM: CPT

## 2021-02-01 PROCEDURE — 83690 ASSAY OF LIPASE: CPT | Performed by: EMERGENCY MEDICINE

## 2021-02-01 PROCEDURE — 82550 ASSAY OF CK (CPK): CPT | Performed by: EMERGENCY MEDICINE

## 2021-02-01 PROCEDURE — 84484 ASSAY OF TROPONIN QUANT: CPT | Performed by: EMERGENCY MEDICINE

## 2021-02-01 PROCEDURE — 93010 ELECTROCARDIOGRAM REPORT: CPT

## 2021-02-01 PROCEDURE — 93005 ELECTROCARDIOGRAM TRACING: CPT

## 2021-02-01 PROCEDURE — 36415 COLL VENOUS BLD VENIPUNCTURE: CPT

## 2021-02-01 PROCEDURE — 80053 COMPREHEN METABOLIC PANEL: CPT | Performed by: EMERGENCY MEDICINE

## 2021-02-01 PROCEDURE — 84443 ASSAY THYROID STIM HORMONE: CPT | Performed by: EMERGENCY MEDICINE

## 2021-02-01 PROCEDURE — 99284 EMERGENCY DEPT VISIT MOD MDM: CPT

## 2021-02-01 PROCEDURE — 85025 COMPLETE CBC W/AUTO DIFF WBC: CPT | Performed by: EMERGENCY MEDICINE

## 2021-02-01 PROCEDURE — 81025 URINE PREGNANCY TEST: CPT

## 2021-02-01 RX ORDER — CYCLOBENZAPRINE HCL 10 MG
10 TABLET ORAL 3 TIMES DAILY PRN
Qty: 90 TABLET | Refills: 0 | Status: SHIPPED | OUTPATIENT
Start: 2021-02-01 | End: 2021-03-18

## 2021-02-01 NOTE — ED INITIAL ASSESSMENT (HPI)
Pt presents to the ED with complaints of fatigue, \"legs giving out\". Pt states she \"got nervous\" this morning so called 911 and went to [facility]. Pt states there all her blood work was normal but \"I don't want to take their word for it\".  Pt awake, speech

## 2021-02-02 LAB
ATRIAL RATE: 71 BPM
P AXIS: 26 DEGREES
P-R INTERVAL: 164 MS
Q-T INTERVAL: 430 MS
QRS DURATION: 90 MS
QTC CALCULATION (BEZET): 467 MS
R AXIS: 14 DEGREES
T AXIS: 32 DEGREES
VENTRICULAR RATE: 71 BPM

## 2021-02-02 NOTE — ED NOTES
Patient alert and oriented x3. No respiratory distress noted. Skin pink, warm, and dry. Patient presents with fatigue.   Patient was evaluated at [facility] ER earlier today and Dx with a fibromyalgia exacerbation but states she believes they were wro

## 2021-02-03 ENCOUNTER — HOSPITAL ENCOUNTER (EMERGENCY)
Facility: HOSPITAL | Age: 41
Discharge: HOME | End: 2021-02-03
Attending: EMERGENCY MEDICINE
Payer: COMMERCIAL

## 2021-02-03 ENCOUNTER — TELEPHONE (OUTPATIENT)
Dept: SURGERY | Facility: CLINIC | Age: 41
End: 2021-02-03

## 2021-02-03 ENCOUNTER — APPOINTMENT (OUTPATIENT)
Dept: GENERAL RADIOLOGY | Facility: HOSPITAL | Age: 41
End: 2021-02-03
Attending: EMERGENCY MEDICINE
Payer: COMMERCIAL

## 2021-02-03 VITALS
RESPIRATION RATE: 16 BRPM | TEMPERATURE: 98 F | DIASTOLIC BLOOD PRESSURE: 66 MMHG | SYSTOLIC BLOOD PRESSURE: 100 MMHG | OXYGEN SATURATION: 93 % | HEART RATE: 68 BPM

## 2021-02-03 DIAGNOSIS — S40.012A CONTUSION OF LEFT SHOULDER, INITIAL ENCOUNTER: ICD-10-CM

## 2021-02-03 DIAGNOSIS — R29.6 FALLS FREQUENTLY: Primary | ICD-10-CM

## 2021-02-03 LAB
ALBUMIN SERPL-MCNC: 3.3 G/DL (ref 3.4–5)
ALBUMIN/GLOB SERPL: 0.9 {RATIO} (ref 1–2)
ALP LIVER SERPL-CCNC: 165 U/L
ALT SERPL-CCNC: 38 U/L
AMPHET UR QL SCN: NEGATIVE
ANION GAP SERPL CALC-SCNC: 3 MMOL/L (ref 0–18)
AST SERPL-CCNC: 21 U/L (ref 15–37)
BASOPHILS # BLD AUTO: 0.02 X10(3) UL (ref 0–0.2)
BASOPHILS NFR BLD AUTO: 0.3 %
BILIRUB SERPL-MCNC: 0.1 MG/DL (ref 0.1–2)
BILIRUB UR QL STRIP.AUTO: NEGATIVE
BUN BLD-MCNC: 12 MG/DL (ref 7–18)
BUN/CREAT SERPL: 13.6 (ref 10–20)
CALCIUM BLD-MCNC: 8.7 MG/DL (ref 8.5–10.1)
CANNABINOIDS UR QL SCN: NEGATIVE
CHLORIDE SERPL-SCNC: 106 MMOL/L (ref 98–112)
CO2 SERPL-SCNC: 28 MMOL/L (ref 21–32)
COCAINE UR QL: NEGATIVE
COLOR UR AUTO: YELLOW
CREAT BLD-MCNC: 0.88 MG/DL
CREAT UR-SCNC: 167 MG/DL
DEPRECATED RDW RBC AUTO: 47.1 FL (ref 35.1–46.3)
EOSINOPHIL # BLD AUTO: 0.15 X10(3) UL (ref 0–0.7)
EOSINOPHIL NFR BLD AUTO: 2.5 %
ERYTHROCYTE [DISTWIDTH] IN BLOOD BY AUTOMATED COUNT: 14.4 % (ref 11–15)
ETHANOL SERPL-MCNC: <3 MG/DL (ref ?–3)
GLOBULIN PLAS-MCNC: 3.7 G/DL (ref 2.8–4.4)
GLUCOSE BLD-MCNC: 87 MG/DL (ref 70–99)
GLUCOSE UR STRIP.AUTO-MCNC: NEGATIVE MG/DL
HCT VFR BLD AUTO: 36.5 %
HGB BLD-MCNC: 11.2 G/DL
IMM GRANULOCYTES # BLD AUTO: 0.02 X10(3) UL (ref 0–1)
IMM GRANULOCYTES NFR BLD: 0.3 %
KETONES UR STRIP.AUTO-MCNC: NEGATIVE MG/DL
LYMPHOCYTES # BLD AUTO: 2.1 X10(3) UL (ref 1–4)
LYMPHOCYTES NFR BLD AUTO: 35.7 %
M PROTEIN MFR SERPL ELPH: 7 G/DL (ref 6.4–8.2)
MCH RBC QN AUTO: 27.6 PG (ref 26–34)
MCHC RBC AUTO-ENTMCNC: 30.7 G/DL (ref 31–37)
MCV RBC AUTO: 89.9 FL
MONOCYTES # BLD AUTO: 0.26 X10(3) UL (ref 0.1–1)
MONOCYTES NFR BLD AUTO: 4.4 %
NEUTROPHILS # BLD AUTO: 3.34 X10 (3) UL (ref 1.5–7.7)
NEUTROPHILS # BLD AUTO: 3.34 X10(3) UL (ref 1.5–7.7)
NEUTROPHILS NFR BLD AUTO: 56.8 %
NITRITE UR QL STRIP.AUTO: NEGATIVE
OPIATES UR QL SCN: NEGATIVE
OSMOLALITY SERPL CALC.SUM OF ELEC: 283 MOSM/KG (ref 275–295)
OXYCODONE UR QL SCN: NEGATIVE
PH UR STRIP.AUTO: 6 [PH] (ref 4.5–8)
PLATELET # BLD AUTO: 297 10(3)UL (ref 150–450)
POTASSIUM SERPL-SCNC: 4.3 MMOL/L (ref 3.5–5.1)
PROT UR STRIP.AUTO-MCNC: NEGATIVE MG/DL
RBC # BLD AUTO: 4.06 X10(6)UL
RBC UR QL AUTO: NEGATIVE
SODIUM SERPL-SCNC: 137 MMOL/L (ref 136–145)
SP GR UR STRIP.AUTO: 1.02 (ref 1–1.03)
UROBILINOGEN UR STRIP.AUTO-MCNC: <2 MG/DL
WBC # BLD AUTO: 5.9 X10(3) UL (ref 4–11)

## 2021-02-03 PROCEDURE — 81001 URINALYSIS AUTO W/SCOPE: CPT | Performed by: EMERGENCY MEDICINE

## 2021-02-03 PROCEDURE — 85025 COMPLETE CBC W/AUTO DIFF WBC: CPT | Performed by: EMERGENCY MEDICINE

## 2021-02-03 PROCEDURE — 36415 COLL VENOUS BLD VENIPUNCTURE: CPT

## 2021-02-03 PROCEDURE — 99284 EMERGENCY DEPT VISIT MOD MDM: CPT

## 2021-02-03 PROCEDURE — 99285 EMERGENCY DEPT VISIT HI MDM: CPT

## 2021-02-03 PROCEDURE — 80307 DRUG TEST PRSMV CHEM ANLYZR: CPT | Performed by: EMERGENCY MEDICINE

## 2021-02-03 PROCEDURE — 80053 COMPREHEN METABOLIC PANEL: CPT | Performed by: EMERGENCY MEDICINE

## 2021-02-03 PROCEDURE — 82077 ASSAY SPEC XCP UR&BREATH IA: CPT | Performed by: EMERGENCY MEDICINE

## 2021-02-03 PROCEDURE — 73030 X-RAY EXAM OF SHOULDER: CPT | Performed by: EMERGENCY MEDICINE

## 2021-02-03 NOTE — ED INITIAL ASSESSMENT (HPI)
Pt in per EMS from home c/o multiple falls. Pt states last fall was at 0530 this morning. Pt seems lethargic on arrival, slow and slurred speech with delayed responses,  states hx of depression and anxiety.  States she has been seen multiple times for the f

## 2021-02-03 NOTE — TELEPHONE ENCOUNTER
Attempted to reach patient to advise her to be evaluated in the ER but mail box is full and unable to leave a message. Noted patient is currently at the [facility] ER. Patient was last seen by Meryl LLANOS 1/6/2020.   She will need a follow up visit with Dr Shemar Wynn

## 2021-02-03 NOTE — ED PROVIDER NOTES
Patient Seen in: [facility] Emergency Department      History   Patient presents with:  Fall    Stated Complaint:     HPI/Subjective:   HPI    25-year-old female presents to the emergency department stating that she fell and injured her left shoulde EPIDURAL INJECTION N/A 3/23/2016    Performed by Fabian Dahl MD at [facility] MAIN OR   • OTHER      Mass in the left arm   • OTHER SURGICAL HISTORY      right ankle surg x 3 times - no metal   • OTHER SURGICAL HISTORY      EGD x 5 times   • OTHER SURGICAL H • SYNVISC INJECTION OF BILATERAL KNEE JOINT Bilateral 10/11/2016    Performed by Marcia Francis MD at [facility] MAIN OR   • TRANSFORAMINAL EPIDURAL - LUMBAR N/A 7/22/2019    Performed by Gregorio Anna MD at [address] [place] [address] Abdominal:      General: Bowel sounds are normal.      Palpations: Abdomen is soft. Tenderness: There is no abdominal tenderness. There is no rebound. Musculoskeletal:         General: Tenderness present.       Left shoulder: She exhibits decreased Abnormality         Status                     ---------                               -----------         ------                     CBC W/ DIFFERENTIAL[[id number]]          Abnormal            Final result                 Please view results for these melania some of her drowsiness as well as the cyclobenzaprine. I reviewed this with her I suggested that she consider cutting the cyclobenzaprine in half. She is made aware of her x-ray results. Patient was discharged in good condition.   She was much more alert

## 2021-02-03 NOTE — TELEPHONE ENCOUNTER
Pt calling stating she keeps losing her balance and has head pain after hitting her head during falls, pt asking if Dr. Darius Up can help

## 2021-02-04 NOTE — TELEPHONE ENCOUNTER
Patient talking about Heriberto Cali her son who walked 12 miles from home and was found in [place] . Patient stated her son was found by the police and was brought home safely. Patient asks if she can bring her sons who are about 25years old to her appt.  Gilberto Dickson

## 2021-02-04 NOTE — TELEPHONE ENCOUNTER
Pt calling again stating she keeps falling, asking to speak w/RN; pt further states she's in \"copious amounts of pain\"

## 2021-02-08 ENCOUNTER — PATIENT MESSAGE (OUTPATIENT)
Dept: PAIN CLINIC | Facility: CLINIC | Age: 41
End: 2021-02-08

## 2021-02-08 NOTE — TELEPHONE ENCOUNTER
From: Noelle Gage  To: Katie Owens MD  Sent: 2/8/2021 1:23 PM CST  Subject: Other    With all these years of insane pain I have been advised by several to get the surgery.  What do you think

## 2021-02-09 ENCOUNTER — APPOINTMENT (OUTPATIENT)
Dept: GENERAL RADIOLOGY | Facility: HOSPITAL | Age: 41
End: 2021-02-09
Attending: EMERGENCY MEDICINE
Payer: COMMERCIAL

## 2021-02-09 ENCOUNTER — OFFICE VISIT (OUTPATIENT)
Dept: PAIN CLINIC | Facility: CLINIC | Age: 41
End: 2021-02-09
Payer: COMMERCIAL

## 2021-02-09 DIAGNOSIS — M54.12 CERVICAL RADICULITIS: ICD-10-CM

## 2021-02-09 DIAGNOSIS — M54.16 LUMBAR RADICULITIS: Primary | ICD-10-CM

## 2021-02-09 DIAGNOSIS — M54.14 THORACIC RADICULITIS: ICD-10-CM

## 2021-02-09 PROBLEM — F33.2 SEVERE RECURRENT MAJOR DEPRESSION (HCC): Status: ACTIVE | Noted: 2020-02-11

## 2021-02-09 PROBLEM — F41.1 GENERALIZED ANXIETY DISORDER: Status: ACTIVE | Noted: 2021-02-09

## 2021-02-09 PROCEDURE — 71045 X-RAY EXAM CHEST 1 VIEW: CPT | Performed by: EMERGENCY MEDICINE

## 2021-02-09 PROCEDURE — 99215 OFFICE O/P EST HI 40 MIN: CPT | Performed by: ANESTHESIOLOGY

## 2021-02-09 NOTE — ED INITIAL ASSESSMENT (HPI)
PT was unable to reach her mentally challenged child. PT was unable to knock at the window, and used a wooden spoon to knock. The window shattered, PT denies injury. PT was told she had to come to the ER. PT states \"my family will be better without me\".

## 2021-02-09 NOTE — PROGRESS NOTES
Discussed recent wound on right hand with pt. Pt states she falls frequently and this particular wound is d/t a fall in her garage approx 3 days ago.  Wound edges are not well approximated, center of wound is scabbed and dry, redness surrounding outer perim

## 2021-02-09 NOTE — ED PROVIDER NOTES
Patient Seen in: [facility] Emergency Department      History   Patient presents with:  Eval-P    Stated Complaint: Eval p?  Was at [facility] after she accidentally broke a window trying to get *    HPI/Subjective:   HPI    58-year-old female comes [facility]   • CYST/MASS EXCISION Left 1/27/2017    Performed by Phylicia Goldman MD at [address]   • CYST/MASS EXCISION Left 8/19/2016    Performed by Paula Engel MD at [facility] [facility] OR   • [address]      left   • KNEE JOINT Shelia Rockwood MEDIAL BRANCH Left 9/27/2016    Performed by Ye Solomon MD at [address] [place] [address] Expressway Right 12/16/2014    Performed by Ye Solomon MD at [facility] MAIN OR   • [address] midline, No LAD  Heart: S1S2 normal. No murmurs, regular rate and rhythm  Lungs: Clear to auscultation bilaterally  Abdomen: Soft nontender nondistended normal active bowel sounds without rebound, guarding or masses noted  Back nontender without CVA tender the Xpert Xpress SARS-CoV-2/FLU/RSV assay on the [facility], [facility], [address] [place] [address]. This test is being used under the Food and Drug Administration's Emergency Use Authorization.     The authorized Fact Sheet for Healthcare Providers for t

## 2021-02-09 NOTE — ED NOTES
Patient belongings secured in smart safe bag #[id number] in locker number 1. Patient belongings include:   Pants  Boots  Sweater  Tank top  Sports bra  underwear  Purse     Patient provided with safety mask. RN and MD at bedside.  Plan of care discussed w

## 2021-02-09 NOTE — ED NOTES
Per patient \"I was sent her by DCFS because they said Id be better off here. They think Im hurting my kids but im not. \" Patient lethargic, slow to respond. Patient answers all questions appropriately. Patient ambulatory to bathroom with steady gait.

## 2021-02-10 PROBLEM — F31.60 BIPOLAR 1 DISORDER, MIXED (HCC): Status: ACTIVE | Noted: 2021-02-10

## 2021-02-10 NOTE — ED NOTES
Patient called this RN an \"ignorant bitch\" because she would not let her get something \"personal\" from her bag.  When asked what she needed to get she just kept repeating that it is personal. This RN explained to patient that her belongings were locked

## 2021-02-10 NOTE — ED NOTES
Spoke to DINA Gillis regarding social distance screening. Pt will need infection safety block till 2/16/21.

## 2021-02-10 NOTE — ED NOTES
Pt signed the Patient's Rights and Voluntary form. Pt updated on POC and asks for her  to be called with update.

## 2021-02-10 NOTE — ED NOTES
Per patient, may tell mother in law sarai that she is here and ok but would not like any information provided to [facility] or any other family members at this time. Patient also states \"I dont want to talk to anyone right now either. \"

## 2021-02-10 NOTE — ED NOTES
[facility] states they have no appropriate beds for pt due to she would need a 1:1 because of the CPAP.

## 2021-02-10 NOTE — ED PROVIDER NOTES
Seen by psychiatry. Patient with significant agitation poor insight and judgment. Will require hospitalization. We will continue searching for possible inpatient facilities. Patient with acute panic attack was given IV Ativan.   An EKG was done because

## 2021-02-10 NOTE — ED NOTES
Patient notified this RN that she is having a hard time breathing and repeatedly coughing. This RN listened to lung sounds, they are clear. Oxygen saturation at %. Dr. Paresh Barron notified. Will continue to monitor.

## 2021-02-10 NOTE — ED NOTES
[facility] HERE FOR TRANSPORT TO [facility], PAPERWORK SENT WITH THEM.

## 2021-02-10 NOTE — ED NOTES
Toma calhoun from [facility] called and states pt accepted to [facility] by Dr. Juwan Childers, going to room 820.

## 2021-02-10 NOTE — ED NOTES
[facility] - no appropriate bed d/t not having a sitter    [facility] - [facility] - accepted the referral and packet was faxed for review.

## 2021-02-10 NOTE — ED NOTES
Looking like [facility] may have a bed, pending discharge on [facility]. Paged [address] to review social distance screening, waiting for call back. Page out to  to verify need for 1:1 for CPAP.

## 2021-02-10 NOTE — ED NOTES
Patient found reaching around and into  room. This RN removed drawers from room. Patient later yelled out to nurse's station that she was just looking for tape. This RN explained that she would've been given tape if she had simply asked for tape.

## 2021-02-10 NOTE — CERTIFICATION
Ref: 21  5/3-403, 5/3-602, 5/3-607, 5/3-610    5/3-702, 5/3-813, 5/4-306, 5/4-402, 5/4-403    5/4-405, 5/4-501, 5/4-611, 3/2-967   Inpatient Certificate  Re: Nancy Gomez    (name)     I personally informed the above-named individual of the pu her behavioral history, to suffer mental or emotional deterioration and is reasonably expected, after such deterioration, to meet the criteria of either paragraph one or paragraph two above;   []  An individual who is developmentally disabled and unless tr

## 2021-02-10 NOTE — CONSULTS
[address] YOB: 1980   Age/Gender 42year old female MRN [id number]   Location [address] Attending Dillon Keith MD   Hosp Day # 0 PCP Derrick Bonilla MD     Manoj agitation, and intrusive behavior. It is unclear if she been increasingly psychotic.  apparently has been increasingly worried about her behaviors at home. Apparently the kids were lined with school. Patient could not find them.   Per reports amelia Noted history of multiple pain/crushed discs - history of being on multiple narcotics. \"he stopped the medications for everybody. \"    Family History:     Family History   Problem Relation Age of Onset   • Breast Cancer Sister 45   • Ovarian Cancer Sismoses superficial, and extremely erratic historian. Patient noted to have pressured speech. Patient without tremor or ataxia. Patient however was highly anxious and fidgety.   Patient noted to have extreme mood lability with periods of anger, anxiety, and at t consultation.     Medications:    Scheduled:     • ClonazePAM  1 mg Oral TID   • divalproex Sodium  250 mg Oral TID   • ARIPiprazole  1 mg Oral TID        Prn:   ClonazePAM    Precautions:  Precautions Behavioral Precautions: Close Observation;Suicide    [place] Immediate

## 2021-02-10 NOTE — ED NOTES
Pt  asked to pickup items from ED> meliza, , unable to come for pickup until after work today. Items remain secure.

## 2021-02-10 NOTE — ED NOTES
Patient is remained calm and cooperative throughout ER shift, awaiting admission to psychiatric facility. Vital signs stable.   Patient without complaints at this time

## 2021-02-10 NOTE — BH LEVEL OF CARE ASSESSMENT
Crisis Evaluation Assessment    Kris Resendiz YOB: 1980   Age 42year old MRN [id number]   Location [facility] [facility] Attending Yaritza Busby MD      Patient's legal sex: female  Patient identifies as: female  Farzana Pt states DCFS told her she had to leave 3 nights ago and she's been staying at a hotel. Pt states she went to 3 hotels to \"sit through my thoughts to figure out what I did to cause it. \" Pt states \"I was scared trying to find them.  I would never hurt th my head. \" /  states pt makes statements such as they \"don't need her anymore. \"    Pt denies current or hx of harming others.  Pt broke her son's door and window on Thursday due to thinking her children were missing but they were locked in the room attempt suicide or harm others or property: Yes  Description of Access: household items  Access to Firearm/Weapon: No  Discussion of Removal of Firearm/Weapon: pt denies access to firearms  Do you have a firearm owner ID card?: No    Protective Factors: History:  Pt sees Dr. Karin Cedeno at [facility]. Pt states she's been seeing him for \"years. \" Pt doesn't remember the last time she spoke with him and denies recent med changes. Pt states she's been taking her medication as prescribed.      Pt doesn't ha to[de-identified] Not appropriate for reporting to authorities    Mental Status Exam:   General Appearance  Characteristics: Appropriate clothing(hospital gown)  Eye Contact: Direct  Psychomotor Behavior  Gait/Movement: Other (comment)(laying on hospital bed)  Abnormal Thursday when their sons were online for school. The school witnessed this and called police. Pt states DCFS told her that she can't go home so she's been staying at 3 different hotels. Pt states last night she stayed at her mother in [facility].   s

## 2021-02-10 NOTE — ED NOTES
Transfer Summary     [facility] - no appropriate beds  [facility] - no appropriate beds d/t needing a 1:1 for CIPAP machine  [facility] - packet faxed for review  [place]

## 2021-02-10 NOTE — PROGRESS NOTES
02/09/21 1950   COVID Exposure Risk Screening   Have you been practicing social distancing? Yes   Have you been wearing a mask when in the community? Yes   Are the people you live with following social distancing and wearing a mask?  Yes  (lives with hus

## 2021-02-11 PROBLEM — F31.60 BIPOLAR 1 DISORDER, MIXED (HCC): Status: RESOLVED | Noted: 2021-02-10 | Resolved: 2021-02-11

## 2021-02-15 ENCOUNTER — TELEPHONE (OUTPATIENT)
Dept: NEUROLOGY | Facility: CLINIC | Age: 41
End: 2021-02-15

## 2021-02-15 NOTE — TELEPHONE ENCOUNTER
Please have Dr Keith Cleveland sign the H&P office notes from 02/09/21 so we can do the PA on requested MRI's.   Thank you

## 2021-02-15 NOTE — PROGRESS NOTES
Name: Amelia Jones   : 1980   DOS: 2021      Pain Clinic Follow Up Visit:   Amelia Jones is a 42year old female who presents for recheck of her chronic low back pain. The patient was here today because she is having multiple falls. I also recommended her to be seen by her psychologist.  She is followed with a psychologist and psychiatrist.  I wanted to send her to Dr. Moose Hilario but she has psychologist.  At this point I do not recommend her to take any medication.   I will also wean her

## 2021-02-16 ENCOUNTER — HOSPITAL ENCOUNTER (OUTPATIENT)
Dept: GENERAL RADIOLOGY | Facility: HOSPITAL | Age: 41
Discharge: HOME | End: 2021-02-16
Attending: Other
Payer: COMMERCIAL

## 2021-02-16 ENCOUNTER — TELEPHONE (OUTPATIENT)
Dept: SURGERY | Facility: CLINIC | Age: 41
End: 2021-02-16

## 2021-02-16 PROCEDURE — 72100 X-RAY EXAM L-S SPINE 2/3 VWS: CPT | Performed by: OTHER

## 2021-02-16 NOTE — TELEPHONE ENCOUNTER
LM advising pt that Dr ISAAK DIAS is not able to see or treat her while she is admitted. Advised pt that the doctors and staff where she is admitted will need to treat any injuries that occur while she is there.  Advised pt that PA team will advise once MRI is ap

## 2021-02-16 NOTE — TELEPHONE ENCOUNTER
Pt states she is currently in [facility] she is having extreme back pain and twisted her ankle \"all the way around\". She said the nurses there just told her to ice it. She is wondering if Claudetta Bake will see her over there. Pls advise.

## 2021-02-19 VITALS — HEIGHT: 63 IN | BODY MASS INDEX: 38 KG/M2

## 2021-02-21 ENCOUNTER — HOSPITAL ENCOUNTER (OUTPATIENT)
Facility: HOSPITAL | Age: 41
Setting detail: OBSERVATION
Discharge: NURSING, REHAB OR LONG-TERM CARE | End: 2021-02-23
Attending: EMERGENCY MEDICINE | Admitting: INTERNAL MEDICINE
Payer: COMMERCIAL

## 2021-02-21 DIAGNOSIS — M54.50 BACK PAIN, LUMBOSACRAL: ICD-10-CM

## 2021-02-21 DIAGNOSIS — R29.898 WEAKNESS OF BOTH LOWER EXTREMITIES: Primary | ICD-10-CM

## 2021-02-21 LAB
ALBUMIN SERPL-MCNC: 3.1 G/DL (ref 3.4–5)
ALBUMIN/GLOB SERPL: 1.1 {RATIO} (ref 1–2)
ALP LIVER SERPL-CCNC: 123 U/L
ALT SERPL-CCNC: 26 U/L
ANION GAP SERPL CALC-SCNC: 4 MMOL/L (ref 0–18)
AST SERPL-CCNC: 11 U/L (ref 15–37)
BASOPHILS # BLD AUTO: 0.02 X10(3) UL (ref 0–0.2)
BASOPHILS NFR BLD AUTO: 0.2 %
BILIRUB SERPL-MCNC: 0.1 MG/DL (ref 0.1–2)
BILIRUB UR QL STRIP.AUTO: NEGATIVE
BUN BLD-MCNC: 15 MG/DL (ref 7–18)
BUN/CREAT SERPL: 18.1 (ref 10–20)
CALCIUM BLD-MCNC: 8.7 MG/DL (ref 8.5–10.1)
CHLORIDE SERPL-SCNC: 109 MMOL/L (ref 98–112)
CLARITY UR REFRACT.AUTO: CLEAR
CO2 SERPL-SCNC: 28 MMOL/L (ref 21–32)
COLOR UR AUTO: COLORLESS
CREAT BLD-MCNC: 0.83 MG/DL
DEPRECATED RDW RBC AUTO: 48.5 FL (ref 35.1–46.3)
EOSINOPHIL # BLD AUTO: 0.22 X10(3) UL (ref 0–0.7)
EOSINOPHIL NFR BLD AUTO: 2.4 %
ERYTHROCYTE [DISTWIDTH] IN BLOOD BY AUTOMATED COUNT: 14.9 % (ref 11–15)
GLOBULIN PLAS-MCNC: 2.9 G/DL (ref 2.8–4.4)
GLUCOSE BLD-MCNC: 84 MG/DL (ref 70–99)
GLUCOSE UR STRIP.AUTO-MCNC: NEGATIVE MG/DL
HCT VFR BLD AUTO: 31 %
HGB BLD-MCNC: 9.9 G/DL
IMM GRANULOCYTES # BLD AUTO: 0.03 X10(3) UL (ref 0–1)
IMM GRANULOCYTES NFR BLD: 0.3 %
KETONES UR STRIP.AUTO-MCNC: NEGATIVE MG/DL
LEUKOCYTE ESTERASE UR QL STRIP.AUTO: NEGATIVE
LYMPHOCYTES # BLD AUTO: 2.5 X10(3) UL (ref 1–4)
LYMPHOCYTES NFR BLD AUTO: 27.4 %
M PROTEIN MFR SERPL ELPH: 6 G/DL (ref 6.4–8.2)
MCH RBC QN AUTO: 28.5 PG (ref 26–34)
MCHC RBC AUTO-ENTMCNC: 31.9 G/DL (ref 31–37)
MCV RBC AUTO: 89.3 FL
MONOCYTES # BLD AUTO: 0.57 X10(3) UL (ref 0.1–1)
MONOCYTES NFR BLD AUTO: 6.3 %
NEUTROPHILS # BLD AUTO: 5.78 X10 (3) UL (ref 1.5–7.7)
NEUTROPHILS # BLD AUTO: 5.78 X10(3) UL (ref 1.5–7.7)
NEUTROPHILS NFR BLD AUTO: 63.4 %
NITRITE UR QL STRIP.AUTO: NEGATIVE
OSMOLALITY SERPL CALC.SUM OF ELEC: 292 MOSM/KG (ref 275–295)
PH UR STRIP.AUTO: 7 [PH] (ref 4.5–8)
PLATELET # BLD AUTO: 245 10(3)UL (ref 150–450)
POTASSIUM SERPL-SCNC: 3.9 MMOL/L (ref 3.5–5.1)
PROT UR STRIP.AUTO-MCNC: NEGATIVE MG/DL
RBC # BLD AUTO: 3.47 X10(6)UL
SODIUM SERPL-SCNC: 141 MMOL/L (ref 136–145)
SP GR UR STRIP.AUTO: <1.005 (ref 1–1.03)
UROBILINOGEN UR STRIP.AUTO-MCNC: <2 MG/DL
WBC # BLD AUTO: 9.1 X10(3) UL (ref 4–11)

## 2021-02-22 ENCOUNTER — APPOINTMENT (OUTPATIENT)
Dept: MRI IMAGING | Facility: HOSPITAL | Age: 41
End: 2021-02-22
Attending: EMERGENCY MEDICINE
Payer: COMMERCIAL

## 2021-02-22 PROBLEM — R29.898 WEAKNESS OF BOTH LOWER EXTREMITIES: Status: ACTIVE | Noted: 2021-02-22

## 2021-02-22 PROBLEM — M54.50 BACK PAIN, LUMBOSACRAL: Status: ACTIVE | Noted: 2021-02-22

## 2021-02-22 LAB
AMMONIA PLAS-MCNC: 27 UMOL/L (ref 11–32)
CK SERPL-CCNC: 71 U/L
GLUCOSE BLD-MCNC: 90 MG/DL (ref 70–99)
T4 FREE SERPL-MCNC: 0.8 NG/DL (ref 0.8–1.7)
TSI SER-ACNC: 3.96 MIU/ML (ref 0.36–3.74)
VALPROATE SERPL-MCNC: 35.7 UG/ML (ref 50–100)

## 2021-02-22 PROCEDURE — 99214 OFFICE O/P EST MOD 30 MIN: CPT | Performed by: OTHER

## 2021-02-22 PROCEDURE — 72148 MRI LUMBAR SPINE W/O DYE: CPT | Performed by: EMERGENCY MEDICINE

## 2021-02-22 PROCEDURE — 99220 INITIAL OBSERVATION CARE,LEVL III: CPT | Performed by: INTERNAL MEDICINE

## 2021-02-22 PROCEDURE — 72146 MRI CHEST SPINE W/O DYE: CPT | Performed by: EMERGENCY MEDICINE

## 2021-02-22 PROCEDURE — 72141 MRI NECK SPINE W/O DYE: CPT | Performed by: EMERGENCY MEDICINE

## 2021-02-22 RX ORDER — ALPRAZOLAM 1 MG/1
1 TABLET ORAL NIGHTLY PRN
Status: ON HOLD | COMMUNITY
End: 2021-02-23

## 2021-02-22 RX ORDER — CLONAZEPAM 1 MG/1
2 TABLET ORAL NIGHTLY PRN
Status: DISCONTINUED | OUTPATIENT
Start: 2021-02-22 | End: 2021-02-22 | Stop reason: SDUPTHER

## 2021-02-22 RX ORDER — ONDANSETRON 2 MG/ML
4 INJECTION INTRAMUSCULAR; INTRAVENOUS EVERY 6 HOURS PRN
Status: DISCONTINUED | OUTPATIENT
Start: 2021-02-22 | End: 2021-02-23

## 2021-02-22 RX ORDER — HEPARIN SODIUM 5000 [USP'U]/ML
5000 INJECTION, SOLUTION INTRAVENOUS; SUBCUTANEOUS EVERY 8 HOURS SCHEDULED
Status: DISCONTINUED | OUTPATIENT
Start: 2021-02-22 | End: 2021-02-23

## 2021-02-22 RX ORDER — DICLOFENAC SODIUM 75 MG/1
75 TABLET, DELAYED RELEASE ORAL 2 TIMES DAILY
Status: ON HOLD | COMMUNITY
End: 2021-03-09

## 2021-02-22 RX ORDER — KETOROLAC TROMETHAMINE 30 MG/ML
30 INJECTION, SOLUTION INTRAMUSCULAR; INTRAVENOUS EVERY 6 HOURS PRN
Status: DISCONTINUED | OUTPATIENT
Start: 2021-02-22 | End: 2021-02-23

## 2021-02-22 RX ORDER — LORAZEPAM 2 MG/ML
0.5 INJECTION INTRAMUSCULAR EVERY 6 HOURS PRN
Status: DISCONTINUED | OUTPATIENT
Start: 2021-02-22 | End: 2021-02-23

## 2021-02-22 RX ORDER — MORPHINE SULFATE 2 MG/ML
0.5 INJECTION, SOLUTION INTRAMUSCULAR; INTRAVENOUS EVERY 4 HOURS PRN
Status: DISCONTINUED | OUTPATIENT
Start: 2021-02-22 | End: 2021-02-22

## 2021-02-22 RX ORDER — PRAZOSIN HYDROCHLORIDE 2 MG/1
2 CAPSULE ORAL 2 TIMES DAILY
Status: ON HOLD | COMMUNITY
End: 2021-03-09

## 2021-02-22 RX ORDER — LORAZEPAM 2 MG/ML
1 INJECTION INTRAMUSCULAR ONCE
Status: COMPLETED | OUTPATIENT
Start: 2021-02-22 | End: 2021-02-22

## 2021-02-22 RX ORDER — ACETAMINOPHEN 325 MG/1
650 TABLET ORAL EVERY 6 HOURS PRN
Status: DISCONTINUED | OUTPATIENT
Start: 2021-02-22 | End: 2021-02-23

## 2021-02-22 RX ORDER — CLONAZEPAM 1 MG/1
4 TABLET ORAL NIGHTLY PRN
Status: DISCONTINUED | OUTPATIENT
Start: 2021-02-22 | End: 2021-02-23

## 2021-02-22 RX ORDER — HYDROMORPHONE HYDROCHLORIDE 1 MG/ML
0.5 INJECTION, SOLUTION INTRAMUSCULAR; INTRAVENOUS; SUBCUTANEOUS ONCE
Status: COMPLETED | OUTPATIENT
Start: 2021-02-22 | End: 2021-02-22

## 2021-02-22 RX ORDER — DEXAMETHASONE SODIUM PHOSPHATE 4 MG/ML
8 VIAL (ML) INJECTION ONCE AS NEEDED
Status: ACTIVE | OUTPATIENT
Start: 2021-02-22 | End: 2021-02-22

## 2021-02-22 RX ORDER — QUETIAPINE 100 MG/1
200 TABLET, FILM COATED ORAL NIGHTLY
Status: ON HOLD | COMMUNITY
End: 2021-02-23

## 2021-02-22 RX ORDER — PRAZOSIN HYDROCHLORIDE 2 MG/1
2 CAPSULE ORAL 2 TIMES DAILY
Status: DISCONTINUED | OUTPATIENT
Start: 2021-02-22 | End: 2021-02-23

## 2021-02-22 RX ORDER — DIPHENHYDRAMINE HCL 25 MG
25 CAPSULE ORAL EVERY 6 HOURS PRN
Status: DISCONTINUED | OUTPATIENT
Start: 2021-02-22 | End: 2021-02-23

## 2021-02-22 RX ORDER — CLONAZEPAM 2 MG/1
2 TABLET ORAL NIGHTLY PRN
Status: ON HOLD | COMMUNITY
End: 2021-02-23

## 2021-02-22 RX ORDER — CLONAZEPAM 1 MG/1
2 TABLET ORAL NIGHTLY PRN
Status: DISCONTINUED | OUTPATIENT
Start: 2021-02-22 | End: 2021-02-23

## 2021-02-22 RX ORDER — QUETIAPINE 100 MG/1
200 TABLET, FILM COATED ORAL NIGHTLY
Status: DISCONTINUED | OUTPATIENT
Start: 2021-02-22 | End: 2021-02-23

## 2021-02-22 RX ORDER — GABAPENTIN 100 MG/1
200 CAPSULE ORAL 3 TIMES DAILY
Status: DISCONTINUED | OUTPATIENT
Start: 2021-02-22 | End: 2021-02-23

## 2021-02-22 RX ORDER — BACLOFEN 5 MG/1
5 TABLET ORAL EVERY 8 HOURS PRN
Status: DISCONTINUED | OUTPATIENT
Start: 2021-02-22 | End: 2021-02-22

## 2021-02-22 RX ORDER — ALPRAZOLAM 1 MG/1
1 TABLET ORAL NIGHTLY PRN
Status: DISCONTINUED | OUTPATIENT
Start: 2021-02-22 | End: 2021-02-23

## 2021-02-22 NOTE — H&P
[facility]  History and Physical     Kp Bond Patient Status:  Emergency    1980 MRN [id number]   Location [address] [facility] [address] Attending No att. providers found   Hosp Day # 0 PCP Otto Brown MD     Chief C 9/30/2014    Performed by Wood Solis MD at [facility] MAIN OR   • LUMBAR INTERLAMINAR EPIDURAL INJECTION N/A 3/23/2016    Performed by Wood Solis MD at [facility] MAIN OR   • OTHER      Mass in the left arm   • OTHER SURGICAL HISTORY      right ankle surg x INJECTION BILATERAL Bilateral 6/10/2014    Performed by Luz Ventura MD at [facility] MAIN OR   • [address] INJECTION OF BILATERAL KNEE JOINT Bilateral 10/11/2016    Performed by Luz Ventura MD at [facility] MAIN OR   • TRANSFORAMINAL EPIDURAL - LUMBAR N/A 7/22/2 •  clonazePAM 2 MG Oral Tab, TK 1 T PO DURING THE DAY AND 1 T PO QHS, Disp: , Rfl: 5    •  Prazosin HCl 2 MG Oral Cap, TK 1 C PO BID, Disp: , Rfl:     •  Acetaminophen ER (TYLENOL 8 HOUR ARTHRITIS PAIN) 650 MG Oral Tab CR, Take 1,300 mg by mouth daily. Recent Labs   Lab 02/19/21  0736 02/21/21  2232   GLU 70 84   BUN 13 15   CREATSERUM 0.85 0.83   GFRAA 98 101   GFRNAA 85 87   CA 9.6 8.7   ALB 3.5 3.1*    141   K 4.1  4.1 3.9    109   CO2 27.0 28.0   ALKPHO 121* 123*   AST 15 11*   ALT

## 2021-02-22 NOTE — BH PROGRESS NOTE
Went to see the pt earlier and she signed the voluntary admission and rights of an individual. She had no questions about the forms after they were explained to her. These were scanned into this record and pt given a copy of them.

## 2021-02-22 NOTE — ED PROVIDER NOTES
Patient Seen in: [facility] Emergency Department      History   Patient presents with:  Pain    Stated Complaint: Pain    HPI/Subjective:   HPI    48-year-old female who is at [facility] at this time comes to the hospital initially had bee ARTHROSCOP,DIAGNOSTIC      left   • KNEE JOINT INJECTIONS UNDER FLUOROSCOPY BILATERAL Bilateral 3/1/2016    Performed by Lyric Solis MD at [facility] MAIN OR   • LUMBAR FACET INJECTION Bilateral 9/30/2014    Performed by Lyric Solis MD at [address] [place] [address] MAIN OR   • RADIOFREQUENCY ABLATION OF THORACIC OR LUMBAR MEDIAL BRANCH Left 12/9/2014    Performed by Nancy Beauchamp MD at [facility] MAIN OR   • REMOVAL GALLBLADDER     • SACROILIAC JOINT INJECTION BILATERAL Bilateral 6/10/2014    Performed by Aaron Haines range of motion noted without tenderness  Neuro: Diminished motor skills of her lower and upper extremities with decreased sensation as well without movement to pinprick of her lower extremities and some gross movement to her hands noted.     ED Course left arm. FINDINGS:  No evidence of acute displaced fracture or dislocation. Normal mineralization. Unremarkable soft tissues. CONCLUSION:  No evidence of acute displaced fracture or dislocation in the left shoulder.    Dictated by (CST): Va Ornaments noted along the chest wall. CONCLUSION:  No active cardiopulmonary process identified.     Dictated by (CST): Mary Ellen York MD on 2/09/2021 at 4:51 PM     Finalized by (CST): Mary Ellen York MD on 2/09/2021 at 4:51 PM       Medicatio

## 2021-02-22 NOTE — ED NOTES
Report received from Hank zabala RN. Patient sleeping on cot in no apparent distress. [facility] is at bedside. No needs are expressed.

## 2021-02-22 NOTE — CONSULTS
[facility]    Report of Consultation    Sara Zarate Patient Status:  Observation    1980 MRN [id number]   [facility] 3NE-A Attending Elliott Irizarry MD   Hosp Day # 0 PCP Benji Khanna MD     Date of Admission:   Left 8/19/2016    Performed by Page Little MD at [facility] MAIN OR   • [address]      left   • KNEE JOINT INJECTIONS UNDER FLUOROSCOPY BILATERAL Bilateral 3/1/2016    Performed by Forrest Yu MD at [facility] MAIN OR   • Sylvia Connell MEDIAL BRANCH Right 12/16/2014    Performed by Holly Zimmer MD at [facility] MAIN OR   • [address] Left 12/9/2014    Performed by Holly Zimmer MD at [facility] MAIN OR   • REMOVAL GALLBLADDER     • [address] Prazosin HCl (MINIPRESS) cap 2 mg, 2 mg, Oral, BID  •  QUEtiapine Fumarate (SEROQUEL) tab 200 mg, 200 mg, Oral, Nightly  •  Valproate Sodium (DEPACON) 500 mg in sodium chloride 0.9% 100 mL IVPB, 500 mg, Intravenous, Once  •  dexamethasone Sodium Phosphate AST 11 02/21/2021    BILT 0.1 02/21/2021    ALB 3.1 02/21/2021    TP 6.0 02/21/2021         Prior pertinent laboratory work-up:    MRI T and L spine  CONCLUSION:    1.  L5-S1 disc protrusion and facet arthropathy without foraminal narrowing or neural com needed, as well as hydration, and sleep aids- benadryl or Ativan ok, goal for uninterrupted sleep as much as possible    Thank you for allowing me to participate in the evaluation of your patient,    Alyssa Louis DO  Neuromuscular and General Neurology

## 2021-02-22 NOTE — PLAN OF CARE
Assumed care at 0730. A&Ox4, RA, VSS, NSR on tele. Admission navigator completed. Suicide precautions. Ativan and zofran PRN. Regular diet, safety tray. L AC SL. Lidocaine patch to lower back for pain. Burgos intact. Sitter in room. Pt updated on POC.  Will

## 2021-02-22 NOTE — CONSULTS
[address] YOB: 1980   Age/Gender 42year old female MRN [id number]   Location [address] Attending Reji Saldaña MD   [facility] Day # 0 PCP Candy Irving MD     Date yelled out and then she was brought to the emergency room. MRI was negative. Basic CMP and CBCs were essentially unremarkable. When discussing with the patient, patient showed a significant amount of vadim indifférence when processing issues.   She wa • Reflux    • Sleep apnea     uses cpap at night       Family History:     Family History   Problem Relation Age of Onset   • Breast Cancer Sister 45   • Ovarian Cancer Sister 45   • Other (lupus) Sister    • Other (strokes) Sister    • Psychiatric Mothe positive family support    Assessment    Bipolar disorder, mixed, severe without psychotic features  Patient with significant symptoms of conversion disorder  Generalized anxiety    Recommendations    1.   Patient appears to have more psychogenic causes of and Free T4          Standing Status: Standing          Number of Occurrences: 1          Order Specific Question: Release to patient          Answer: Immediate

## 2021-02-22 NOTE — ED INITIAL ASSESSMENT (HPI)
Pt here from [facility] c/o of right hip pain going down the legs, pt states unable to move bilateral legs.

## 2021-02-23 VITALS
SYSTOLIC BLOOD PRESSURE: 114 MMHG | DIASTOLIC BLOOD PRESSURE: 79 MMHG | HEIGHT: 63 IN | OXYGEN SATURATION: 95 % | RESPIRATION RATE: 18 BRPM | HEART RATE: 106 BPM | BODY MASS INDEX: 38.98 KG/M2 | WEIGHT: 220 LBS | TEMPERATURE: 99 F

## 2021-02-23 PROBLEM — F31.9 BIPOLAR 1 DISORDER (HCC): Status: ACTIVE | Noted: 2021-02-10

## 2021-02-23 LAB
ANION GAP SERPL CALC-SCNC: 6 MMOL/L (ref 0–18)
ATRIAL RATE: 92 BPM
BASOPHILS # BLD AUTO: 0.03 X10(3) UL (ref 0–0.2)
BASOPHILS NFR BLD AUTO: 0.4 %
BUN BLD-MCNC: 14 MG/DL (ref 7–18)
BUN/CREAT SERPL: 14.3 (ref 10–20)
CALCIUM BLD-MCNC: 8.5 MG/DL (ref 8.5–10.1)
CHLORIDE SERPL-SCNC: 107 MMOL/L (ref 98–112)
CO2 SERPL-SCNC: 26 MMOL/L (ref 21–32)
CREAT BLD-MCNC: 0.98 MG/DL
DEPRECATED RDW RBC AUTO: 50.3 FL (ref 35.1–46.3)
EOSINOPHIL # BLD AUTO: 0.36 X10(3) UL (ref 0–0.7)
EOSINOPHIL NFR BLD AUTO: 4.7 %
ERYTHROCYTE [DISTWIDTH] IN BLOOD BY AUTOMATED COUNT: 15.4 % (ref 11–15)
GLUCOSE BLD-MCNC: 113 MG/DL (ref 70–99)
GLUCOSE BLD-MCNC: 95 MG/DL (ref 70–99)
GLUCOSE BLD-MCNC: 98 MG/DL (ref 70–99)
HCT VFR BLD AUTO: 35.3 %
HGB BLD-MCNC: 10.9 G/DL
IMM GRANULOCYTES # BLD AUTO: 0.05 X10(3) UL (ref 0–1)
IMM GRANULOCYTES NFR BLD: 0.7 %
LYMPHOCYTES # BLD AUTO: 2.21 X10(3) UL (ref 1–4)
LYMPHOCYTES NFR BLD AUTO: 28.8 %
MCH RBC QN AUTO: 27.9 PG (ref 26–34)
MCHC RBC AUTO-ENTMCNC: 30.9 G/DL (ref 31–37)
MCV RBC AUTO: 90.5 FL
MONOCYTES # BLD AUTO: 0.5 X10(3) UL (ref 0.1–1)
MONOCYTES NFR BLD AUTO: 6.5 %
NEUTROPHILS # BLD AUTO: 4.52 X10 (3) UL (ref 1.5–7.7)
NEUTROPHILS # BLD AUTO: 4.52 X10(3) UL (ref 1.5–7.7)
NEUTROPHILS NFR BLD AUTO: 58.9 %
OSMOLALITY SERPL CALC.SUM OF ELEC: 288 MOSM/KG (ref 275–295)
P AXIS: 55 DEGREES
P-R INTERVAL: 154 MS
PLATELET # BLD AUTO: 259 10(3)UL (ref 150–450)
POTASSIUM SERPL-SCNC: 4.5 MMOL/L (ref 3.5–5.1)
Q-T INTERVAL: 372 MS
QRS DURATION: 94 MS
QTC CALCULATION (BEZET): 460 MS
R AXIS: 26 DEGREES
RBC # BLD AUTO: 3.9 X10(6)UL
SODIUM SERPL-SCNC: 139 MMOL/L (ref 136–145)
T AXIS: 40 DEGREES
VENTRICULAR RATE: 92 BPM
WBC # BLD AUTO: 7.7 X10(3) UL (ref 4–11)

## 2021-02-23 PROCEDURE — 99225 SUBSEQUENT OBSERVATION CARE: CPT | Performed by: OTHER

## 2021-02-23 PROCEDURE — 99212 OFFICE O/P EST SF 10 MIN: CPT | Performed by: OTHER

## 2021-02-23 PROCEDURE — 99217 OBSERVATION CARE DISCHARGE: CPT | Performed by: STUDENT IN AN ORGANIZED HEALTH CARE EDUCATION/TRAINING PROGRAM

## 2021-02-23 RX ORDER — ARIPIPRAZOLE 5 MG/1
2.5 TABLET ORAL 2 TIMES DAILY
Status: DISCONTINUED | OUTPATIENT
Start: 2021-02-23 | End: 2021-02-23

## 2021-02-23 RX ORDER — DIVALPROEX SODIUM 250 MG/1
250 TABLET, DELAYED RELEASE ORAL 2 TIMES DAILY
Qty: 1 TABLET | Refills: 0 | Status: ON HOLD | COMMUNITY
Start: 2021-02-23 | End: 2021-03-09

## 2021-02-23 RX ORDER — CLONAZEPAM 1 MG/1
1 TABLET ORAL 3 TIMES DAILY
Status: DISCONTINUED | OUTPATIENT
Start: 2021-02-23 | End: 2021-02-23

## 2021-02-23 RX ORDER — DIVALPROEX SODIUM 250 MG/1
250 TABLET, DELAYED RELEASE ORAL 2 TIMES DAILY
Status: DISCONTINUED | OUTPATIENT
Start: 2021-02-23 | End: 2021-02-23

## 2021-02-23 RX ORDER — DIVALPROEX SODIUM 500 MG/1
500 TABLET, DELAYED RELEASE ORAL NIGHTLY
Status: DISCONTINUED | OUTPATIENT
Start: 2021-02-23 | End: 2021-02-23

## 2021-02-23 RX ORDER — TRIHEXYPHENIDYL HYDROCHLORIDE 2 MG/1
2 TABLET ORAL 2 TIMES DAILY WITH MEALS
Qty: 1 TABLET | Refills: 0 | Status: ON HOLD | COMMUNITY
Start: 2021-02-23 | End: 2021-03-09

## 2021-02-23 RX ORDER — LITHIUM CARBONATE 300 MG/1
600 TABLET, FILM COATED, EXTENDED RELEASE ORAL NIGHTLY
Status: DISCONTINUED | OUTPATIENT
Start: 2021-02-23 | End: 2021-02-23

## 2021-02-23 RX ORDER — CLONAZEPAM 1 MG/1
1 TABLET ORAL 3 TIMES DAILY
Qty: 1 TABLET | Refills: 0 | Status: ON HOLD | COMMUNITY
Start: 2021-02-23 | End: 2021-03-09

## 2021-02-23 RX ORDER — LITHIUM CARBONATE 300 MG/1
600 TABLET, FILM COATED, EXTENDED RELEASE ORAL NIGHTLY
Qty: 1 TABLET | Refills: 0 | Status: ON HOLD | COMMUNITY
Start: 2021-02-23 | End: 2021-03-09

## 2021-02-23 RX ORDER — DIVALPROEX SODIUM 500 MG/1
500 TABLET, DELAYED RELEASE ORAL NIGHTLY
Qty: 1 TABLET | Refills: 0 | Status: ON HOLD | COMMUNITY
Start: 2021-02-23 | End: 2021-03-09

## 2021-02-23 RX ORDER — TRIHEXYPHENIDYL HYDROCHLORIDE 2 MG/1
2 TABLET ORAL 2 TIMES DAILY WITH MEALS
Status: DISCONTINUED | OUTPATIENT
Start: 2021-02-23 | End: 2021-02-23

## 2021-02-23 RX ORDER — ARIPIPRAZOLE 5 MG/1
2.5 TABLET ORAL 2 TIMES DAILY
Qty: 1 TABLET | Refills: 0 | Status: ON HOLD | COMMUNITY
Start: 2021-02-23 | End: 2021-03-09

## 2021-02-23 NOTE — PHYSICAL THERAPY NOTE
PHYSICAL THERAPY EVALUATION - INPATIENT     Room Number: 7697/4587-H  Evaluation Date: 2/23/2021  Type of Evaluation: Initial  Physician Order: PT Eval and Treat    Presenting Problem: LBP, BLE weakness and migraines  Reason for Therapy: Mobility Dys Jo Lew MD at [facility] MAIN OR   • [address]      left   • KNEE JOINT INJECTIONS UNDER FLUOROSCOPY BILATERAL Bilateral 3/1/2016    Performed by Asia Kitchen MD at [facility] MAIN OR   • LUMBAR FACET INJECTION Bilateral 9/30/2014    Performed by Performed by Ye Solomon MD at [address] [place] [address] [address] Left 12/9/2014    Performed by Ye Solomon MD at [facility] MAIN OR   • REMOVAL GALLBLADDER     • SACROILIAC JOINT INJECTION BILATERAL Bilateral 6 appropriate responses to stimuli  · Orientation Level:  oriented x4  · Following Commands:  follows multistep commands with repetition  · Safety Judgement:  decreased awareness of need for assistance and decreased awareness of need for safety    RANGE OF M Flexed knee;L Foot flat;Comment(mild kyphosis and intermittent M/L sway, very WBOS)  Stoop/Curb Assistance: Not tested  Comment : chair follow d/t pt comments    Skilled Therapy Provided: Pt presents to PT lying in semi supine position in bed.  Pt is eager and this patient is demonstrating a 35.83% degree of impairment in mobility. Based on this evaluation, patient's clinical presentation is stable and overall the evaluation complexity is considered low.   These impairments and comorbidities manifest themselv

## 2021-02-23 NOTE — PROGRESS NOTES
Report called and given to [facility] at [facility].     [phone]: Ambulance at bedside and transporting pt to [facility] in stable condition

## 2021-02-23 NOTE — BH PROGRESS NOTE
Per Mike ARROYO sup, pt will not need a blocked room for covid concerns upon her return to [facility].

## 2021-02-23 NOTE — PLAN OF CARE
A/Ox4. Anxious at times. Care companion at bedside. Plan to dc back to [facility] today once ok with Neuro. Walked halls with PT and walker. Burgos cath removed this AM.  Tylenol for generalized c/o pain. Staff will cont to monitor.     1220: cleared for Dc pe

## 2021-02-23 NOTE — BH PROGRESS NOTE
Report called to [facility] RN on the Adult Unit of Dalj[facility] Galeas. Patient is going to room 822A. The medical nurse Afshin Pineda is aware to call her report to him and give transport the originally P+C which was just scanned into the system.

## 2021-02-23 NOTE — PLAN OF CARE
Assumed patient care @1900. Patient a&ox4. Very anxious at times. On RA, .  C/o of itching towards beginning of shift, md paged, reordered prn benadryl. Awoke from sleep in middle of night, c/o of R side pain from neck down to leg, rating 9/10.  Given Problem: ANXIETY  Goal: Will report anxiety at manageable levels  Description: INTERVENTIONS:  - Administer medication as ordered  - Teach and rehearse alternative coping skills  - Provide emotional support with 1:1 interaction with staff  Outcome: Progr

## 2021-02-23 NOTE — PROGRESS NOTES
[facility] HOSPITALIST  Progress Note     Nancy Said Patient Status:  Observation    1980 MRN [id number]   [facility] 3NE-A Attending Myron Delgadillo MD   Hosp Day # 0 PCP Gloria Santiago MD     Chief Complaint: LE weakness, jos CK 71            Imaging: Imaging data reviewed in Epic.     Medications:   • Heparin Sodium (Porcine)  5,000 Units Subcutaneous Q8H [address]   • lidocaine-menthol  1 patch Transdermal Daily   • Prazosin HCl  2 mg Oral BID   • QUEtiapine Fumarate  200 mg Oral

## 2021-02-23 NOTE — PROGRESS NOTES
[address] [facility] Neurology Progress Note    Adalid Baum Patient Status:  Observation    1980 MRN [id number]   [facility] 3NE-A Attending Sarah Suarez MD   Hosp Day # 0 PCP Manisha Garcia MD     CC: Generalized weakness cord.  This is superimposed upon stable diffuse disc bulging. Stable C5-6 disc bulging without spinal stenosis or neural compromise.      Assessment/Plan:  · Acute loss of motor function- resolved  · Cord pathology ruled out   · Due to rapid resolution, sy

## 2021-02-23 NOTE — DISCHARGE SUMMARY
[facility] HOSPITALIST  DISCHARGE SUMMARY     Rustam Castillo Patient Status:  Observation    1980 MRN [id number]   [facility] 3NE-A Attending Sarah Matos MD   Hosp Day # 0 PCP Parker Rosas MD     Date of Admission: 2021  D clonazePAM 2 MG Tabs  Commonly known as: KLONOPIN      Take 2 mg by mouth nightly as needed for Anxiety (2-4mg nightly as needed).    Refills: 0     cyclobenzaprine 10 MG Tabs  Commonly known as: FLEXERIL      Take 1 tablet (10 mg total) by mouth 3 (three 104/72    Physical Exam:    General: No acute distress. Respiratory: Clear to auscultation bilaterally. No wheezes. No rhonchi. Cardiovascular: S1, S2. Regular rate and rhythm. No murmurs, rubs or gallops. Abdomen: Soft, nontender, nondistended.   Posi

## 2021-02-23 NOTE — CM/SW NOTE
12pm  MSW spoke to Psych MD who will complete Cert. MSW sent message to Layla Mcfarlane at  [facility] who states they will have a bed, they are waiting for [facility] and to talk to Psych Liaison.     DC PLAN:  BLS  At 3pm call for [facility]  PCS completed      10:36am  Case discus

## 2021-02-23 NOTE — OCCUPATIONAL THERAPY NOTE
OCCUPATIONAL THERAPY QUICK EVALUATION - INPATIENT    Room Number: 8750/9374-L  Evaluation Date: 2/23/2021     Type of Evaluation: Quick Eval  Presenting Problem: back pain, B LE weakness     Physician Order: IP Consult to Occupational Therapy  Reason for T Sleep apnea     uses cpap at night       Past Surgical History  Past Surgical History:   Procedure Laterality Date   • CERVICAL FACET INJECTION Left 9/22/2015    Performed by May Cantu MD at [facility] OR   • CERVICAL FACET INJECTION Right 9/3/2015 Left 7/24/2014    Performed by Forrest Yu MD at [facility] MAIN OR   • [address] [address] Right 10/16/2017    Performed by Forrest Yu MD at [address] weakness and \"slips\"/falls but is unable to provide a timeline since onset of symptoms. SUBJECTIVE  \"I can tell right away when someone believes me or if they don't and that emergency room doctor did not. 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 Pt performed supine>sit EOB with supervision assistance. Pt was able to don non-skid socks with set-up only.  Pt performed sit>stand with RW and CG assistance with min Supervision(plan is for pt to return to [facility] )  OT Device Recommendations: None    PLAN   Patient has been evaluated and presents with no skilled Occupational Therapy needs at this time. Patient discharged from Occupational Therapy services.   Please re-ord

## 2021-02-23 NOTE — PROGRESS NOTES
[facility]  Report of Psychiatric Progress Note    Salud Patterson Patient Status:  Observation    1980 MRN [id number]   [facility] 3NE-A Attending Heath Mederos MD   Hosp Day # 2 PCP Rhiannon Palmer MD     Date of Admission report. She has racing thoughts but no grandiose ideation. No voices/visions. No suicidal ideation.      Past Medical History:   Diagnosis Date   • Anxiety    • Anxiety state, unspecified     also PTSD   • Asthma    • Back problem    • Bipolar affective (HC MD PHILLIP at [facility] MAIN OR   • Eloy 70 Left 9/8/2016    Performed by Fabian Dahl MD at [facility] MAIN OR   • Eloy 70 Left 2/17/2015    Performed by Fabian Dahl MD at [address] [place] [address] Cancer Sister 45   • Other (lupus) Sister    • Other (strokes) Sister    • Psychiatric Mother    • Diabetes Mother    • Other (Other) Mother         thyroid   • Heart Disease Father    • Cancer Neg    • Stroke Neg       reports that she has quit smoking.  H ideation, no hallucinations  Orientation: oriented person, place and oriented situation  Attention and Concentration: distracted  Memory:  intact remote  Language: Intact naming and repetition    Insight: fair  Judgment: fair    Laboratory Data:  Lab Resul This appears to abut the left S1 spinal nerve within the left subarticular zone without evidence of neural compromise. Mild bilateral   facet hypertrophy without foraminal narrowing. PARASPINAL AREA:  Normal with no visible mass.     BONES:  No fractur

## 2021-02-24 ENCOUNTER — HOSPITAL ENCOUNTER (OUTPATIENT)
Dept: POSTOP/PACU | Facility: HOSPITAL | Age: 41
Discharge: HOME | End: 2021-02-24
Attending: Other
Payer: COMMERCIAL

## 2021-02-25 ENCOUNTER — ANESTHESIA (OUTPATIENT)
Dept: POSTOP/PACU | Facility: HOSPITAL | Age: 41
End: 2021-02-25
Payer: COMMERCIAL

## 2021-02-25 ENCOUNTER — HOSPITAL ENCOUNTER (OUTPATIENT)
Dept: POSTOP/PACU | Facility: HOSPITAL | Age: 41
Discharge: HOME | End: 2021-02-25
Attending: Other
Payer: COMMERCIAL

## 2021-02-25 ENCOUNTER — ANESTHESIA EVENT (OUTPATIENT)
Dept: POSTOP/PACU | Facility: HOSPITAL | Age: 41
End: 2021-02-25
Payer: COMMERCIAL

## 2021-02-25 VITALS
RESPIRATION RATE: 20 BRPM | HEIGHT: 63 IN | OXYGEN SATURATION: 99 % | SYSTOLIC BLOOD PRESSURE: 140 MMHG | TEMPERATURE: 98 F | DIASTOLIC BLOOD PRESSURE: 88 MMHG | BODY MASS INDEX: 38 KG/M2 | HEART RATE: 104 BPM

## 2021-02-25 DIAGNOSIS — F31.4 SEVERE DEPRESSED BIPOLAR I DISORDER WITHOUT PSYCHOTIC FEATURES (HCC): ICD-10-CM

## 2021-02-25 RX ORDER — HYDROMORPHONE HYDROCHLORIDE 1 MG/ML
0.4 INJECTION, SOLUTION INTRAMUSCULAR; INTRAVENOUS; SUBCUTANEOUS EVERY 5 MIN PRN
Status: DISCONTINUED | OUTPATIENT
Start: 2021-02-25 | End: 2021-02-25 | Stop reason: HOSPADM

## 2021-02-25 RX ORDER — ONDANSETRON 2 MG/ML
4 INJECTION INTRAMUSCULAR; INTRAVENOUS ONCE
Status: COMPLETED | OUTPATIENT
Start: 2021-02-25 | End: 2021-02-25

## 2021-02-25 RX ORDER — SODIUM CHLORIDE, SODIUM LACTATE, POTASSIUM CHLORIDE, CALCIUM CHLORIDE 600; 310; 30; 20 MG/100ML; MG/100ML; MG/100ML; MG/100ML
INJECTION, SOLUTION INTRAVENOUS CONTINUOUS
Status: DISCONTINUED | OUTPATIENT
Start: 2021-02-25 | End: 2021-02-27

## 2021-02-25 RX ORDER — DEXAMETHASONE SODIUM PHOSPHATE 4 MG/ML
VIAL (ML) INJECTION
Status: COMPLETED
Start: 2021-02-25 | End: 2021-02-25

## 2021-02-25 RX ORDER — ONDANSETRON 2 MG/ML
INJECTION INTRAMUSCULAR; INTRAVENOUS
Status: COMPLETED
Start: 2021-02-25 | End: 2021-02-25

## 2021-02-25 RX ORDER — DEXAMETHASONE SODIUM PHOSPHATE 4 MG/ML
8 VIAL (ML) INJECTION ONCE
Status: COMPLETED | OUTPATIENT
Start: 2021-02-25 | End: 2021-02-25

## 2021-02-25 RX ORDER — ONDANSETRON 2 MG/ML
4 INJECTION INTRAMUSCULAR; INTRAVENOUS ONCE
Status: DISCONTINUED | OUTPATIENT
Start: 2021-02-25 | End: 2021-02-27

## 2021-02-25 RX ORDER — CAFFEINE AND SODIUM BENZOATE 125 MG/ML
INJECTION, SOLUTION INTRAMUSCULAR; INTRAVENOUS
Status: COMPLETED
Start: 2021-02-25 | End: 2021-02-25

## 2021-02-25 RX ORDER — KETOROLAC TROMETHAMINE 30 MG/ML
30 INJECTION, SOLUTION INTRAMUSCULAR; INTRAVENOUS ONCE
Status: COMPLETED | OUTPATIENT
Start: 2021-02-25 | End: 2021-02-25

## 2021-02-25 RX ORDER — ACETAMINOPHEN 500 MG
1000 TABLET ORAL ONCE AS NEEDED
Status: DISCONTINUED | OUTPATIENT
Start: 2021-02-25 | End: 2021-02-25 | Stop reason: HOSPADM

## 2021-02-25 RX ORDER — MIDAZOLAM HYDROCHLORIDE 1 MG/ML
INJECTION INTRAMUSCULAR; INTRAVENOUS
Status: COMPLETED
Start: 2021-02-25 | End: 2021-02-25

## 2021-02-25 RX ORDER — MIDAZOLAM HYDROCHLORIDE 1 MG/ML
1 INJECTION INTRAMUSCULAR; INTRAVENOUS ONCE
Status: DISCONTINUED | OUTPATIENT
Start: 2021-02-25 | End: 2021-02-25 | Stop reason: HOSPADM

## 2021-02-25 RX ORDER — KETOROLAC TROMETHAMINE 30 MG/ML
30 INJECTION, SOLUTION INTRAMUSCULAR; INTRAVENOUS ONCE
Status: DISCONTINUED | OUTPATIENT
Start: 2021-02-25 | End: 2021-02-27

## 2021-02-25 RX ORDER — NALOXONE HYDROCHLORIDE 0.4 MG/ML
80 INJECTION, SOLUTION INTRAMUSCULAR; INTRAVENOUS; SUBCUTANEOUS AS NEEDED
Status: DISCONTINUED | OUTPATIENT
Start: 2021-02-25 | End: 2021-02-25 | Stop reason: HOSPADM

## 2021-02-25 RX ORDER — MIDAZOLAM HYDROCHLORIDE 1 MG/ML
1 INJECTION INTRAMUSCULAR; INTRAVENOUS EVERY 5 MIN PRN
Status: DISCONTINUED | OUTPATIENT
Start: 2021-02-25 | End: 2021-02-25 | Stop reason: HOSPADM

## 2021-02-25 RX ORDER — GLYCOPYRROLATE 0.2 MG/ML
0.2 INJECTION, SOLUTION INTRAMUSCULAR; INTRAVENOUS ONCE
Status: COMPLETED | OUTPATIENT
Start: 2021-02-25 | End: 2021-02-25

## 2021-02-25 RX ORDER — CAFFEINE AND SODIUM BENZOATE 125 MG/ML
250 INJECTION, SOLUTION INTRAMUSCULAR; INTRAVENOUS ONCE
Status: COMPLETED | OUTPATIENT
Start: 2021-02-25 | End: 2021-02-25

## 2021-02-25 RX ORDER — KETOROLAC TROMETHAMINE 30 MG/ML
INJECTION, SOLUTION INTRAMUSCULAR; INTRAVENOUS
Status: COMPLETED
Start: 2021-02-25 | End: 2021-02-25

## 2021-02-25 RX ORDER — SODIUM CHLORIDE, SODIUM LACTATE, POTASSIUM CHLORIDE, CALCIUM CHLORIDE 600; 310; 30; 20 MG/100ML; MG/100ML; MG/100ML; MG/100ML
INJECTION, SOLUTION INTRAVENOUS CONTINUOUS
Status: DISCONTINUED | OUTPATIENT
Start: 2021-02-25 | End: 2021-02-25 | Stop reason: HOSPADM

## 2021-02-25 RX ADMIN — MIDAZOLAM HYDROCHLORIDE 2 MG: 1 INJECTION INTRAMUSCULAR; INTRAVENOUS at 06:58:00

## 2021-02-25 RX ADMIN — CAFFEINE AND SODIUM BENZOATE 250 MG: 125 INJECTION, SOLUTION INTRAMUSCULAR; INTRAVENOUS at 06:30:00

## 2021-02-25 RX ADMIN — KETOROLAC TROMETHAMINE 30 MG: 30 INJECTION, SOLUTION INTRAMUSCULAR; INTRAVENOUS at 06:19:00

## 2021-02-25 RX ADMIN — Medication 80 MG: at 06:49:00

## 2021-02-25 RX ADMIN — SODIUM CHLORIDE, SODIUM LACTATE, POTASSIUM CHLORIDE, CALCIUM CHLORIDE: 600; 310; 30; 20 INJECTION, SOLUTION INTRAVENOUS at 06:57:00

## 2021-02-25 RX ADMIN — SODIUM CHLORIDE, SODIUM LACTATE, POTASSIUM CHLORIDE, CALCIUM CHLORIDE: 600; 310; 30; 20 INJECTION, SOLUTION INTRAVENOUS at 06:46:00

## 2021-02-25 RX ADMIN — ONDANSETRON 4 MG: 2 INJECTION INTRAMUSCULAR; INTRAVENOUS at 06:19:00

## 2021-02-25 RX ADMIN — SODIUM CHLORIDE, SODIUM LACTATE, POTASSIUM CHLORIDE, CALCIUM CHLORIDE: 600; 310; 30; 20 INJECTION, SOLUTION INTRAVENOUS at 06:18:00

## 2021-02-25 RX ADMIN — GLYCOPYRROLATE 0.2 MG: 0.2 INJECTION, SOLUTION INTRAMUSCULAR; INTRAVENOUS at 06:20:00

## 2021-02-25 RX ADMIN — DEXAMETHASONE SODIUM PHOSPHATE 8 MG: 4 MG/ML VIAL (ML) INJECTION at 06:18:00

## 2021-02-25 NOTE — ANESTHESIA PREPROCEDURE EVALUATION
PRE-OP EVALUATION    Patient Name: Irais Mtz    Pre-op Diagnosis: * No surgery found *    * No surgery found *    * Surgery not found *    Pre-op vitals reviewed.   Temp: 98.9 °F (37.2 °C)  Pulse: 88  Resp: 17  BP: 107/74  SpO2: 98 %  Body mass inde suspension 30 mL, 30 mL, Oral, Q4H PRN    •  magnesium hydroxide (MILK OF MAGNESIA) 400 MG/5ML suspension 30 mL, 30 mL, Oral, Nightly PRN    •  clonazePAM (KLONOPIN) disintegrating tab 1 mg, 1 mg, Oral, TID    •  clonazePAM (KLONOPIN) disintegrating tab 1 Active Problem List:     Ankle pain     Achilles tendinitis     CTS (carpal tunnel syndrome)     Peroneal tendonitis     Peroneal tendon rupture     Orthopedic aftercare     Chronic pain     Sacroiliitis (HCC)     Lumbar radiculitis     Lumbar facet arthro HISTORY      right ankle surg x 3 times - no metal   • OTHER SURGICAL HISTORY      EGD x 5 times   • OTHER SURGICAL HISTORY      bile duct stent   • OTHER SURGICAL HISTORY  08/16/16    left elbow cyst   • RADIOFREQUENCY ABLATION OF SACROILIAC JOINT Right 1 EPIDURAL - LUMBAR N/A 7/22/2019    Performed by Nyasia Peraza MD at [facility]   • TRANSFORAMINAL EPIDURAL - LUMBAR Left 6/4/2018    Performed by Nyasia Peraza MD at [address]

## 2021-02-25 NOTE — PAYOR COMM NOTE
--------------  DISCHARGE REVIEW    Payor: Janis Bajwa  #:  [id number]  Authorization Number: [id number]    Admit date: N/A  Admit time:  N/A  Discharge Date: 2/23/2021  4:01 PM     Admitting Physician: Erinn Anna MD  Attending P FLEXERIL      Take 1 tablet (10 mg total) by mouth 3 (three) times daily as needed for Muscle spasms. Quantity: 90 tablet  Refills: 0     Diclofenac Sodium 75 MG Tbec  Commonly known as: VOLTAREN      Take 75 mg by mouth 2 (two) times daily.    Refills: 0

## 2021-02-25 NOTE — PROGRESS NOTES
[facility] / [facility]  ECT Procedure Note    Primus Ca Patient Status:  Outpatient   Age/Gender 42year old female MRN [id number]   Location [address] [facility] Attending Nicole Lorenzo MD   Hosp Day # 0 PCP Mu Succinylcholine 80 mg IV    Seizure Duration:  Motor: 49 seconds       EE seconds    Post-ECT Condition:  Treatment unremarkable    Post-ECT Medications:  Versed 2 mg IV    Cassia Brumfield

## 2021-02-25 NOTE — PROGRESS NOTES
[facility] / [facility]  ECT History & Physical    Austen Ambrose Patient Status:  Outpatient   Age/Gender 42year old female MRN [id number]   Location [address] [facility] Attending Lucian Peterson MD   Hosp Day # 0 PCP Arin Perez 3/23/2016    Performed by Asia Kitchen MD at [facility] MAIN OR   • OTHER      Mass in the left arm   • OTHER SURGICAL HISTORY      right ankle surg x 3 times - no metal   • OTHER SURGICAL HISTORY      EGD x 5 times   • OTHER SURGICAL HISTORY      bile duct s BILATERAL KNEE JOINT Bilateral 10/11/2016    Performed by Bari Posada MD at [facility] MAIN OR   • TRANSFORAMINAL EPIDURAL - LUMBAR N/A 7/22/2019    Performed by Vito Garcia MD at [address] [place] [address]   • TRANSFORAMINAL EPIDURAL - LUMBAR Left discussed the risks and benefits and alternatives with the patient/family. They understand and agree to proceed with plan of care.     Lyric Clark

## 2021-02-25 NOTE — ANESTHESIA POSTPROCEDURE EVALUATION
[address] Patient Status:  Outpatient   Age/Gender 42year old female MRN [id number]   Location [address] [facility] Attending Elliott Montiel MD   Hosp Day # 0 PCP Kamini Nathan MD       Anesthesia Post-op N

## 2021-03-01 ENCOUNTER — ANESTHESIA EVENT (OUTPATIENT)
Dept: POSTOP/PACU | Facility: HOSPITAL | Age: 41
End: 2021-03-01
Payer: COMMERCIAL

## 2021-03-01 ENCOUNTER — ANESTHESIA (OUTPATIENT)
Dept: POSTOP/PACU | Facility: HOSPITAL | Age: 41
End: 2021-03-01
Payer: COMMERCIAL

## 2021-03-01 ENCOUNTER — HOSPITAL ENCOUNTER (OUTPATIENT)
Dept: POSTOP/PACU | Facility: HOSPITAL | Age: 41
Discharge: HOME | End: 2021-03-01
Attending: Other
Payer: COMMERCIAL

## 2021-03-01 VITALS
TEMPERATURE: 99 F | DIASTOLIC BLOOD PRESSURE: 108 MMHG | OXYGEN SATURATION: 99 % | BODY MASS INDEX: 39 KG/M2 | SYSTOLIC BLOOD PRESSURE: 141 MMHG | RESPIRATION RATE: 17 BRPM | HEART RATE: 84 BPM | HEIGHT: 63 IN

## 2021-03-01 DIAGNOSIS — F31.4 SEVERE DEPRESSED BIPOLAR I DISORDER WITHOUT PSYCHOTIC FEATURES (HCC): ICD-10-CM

## 2021-03-01 DIAGNOSIS — Z91.81 AT RISK FOR FALLING: ICD-10-CM

## 2021-03-01 RX ORDER — SODIUM CHLORIDE, SODIUM LACTATE, POTASSIUM CHLORIDE, CALCIUM CHLORIDE 600; 310; 30; 20 MG/100ML; MG/100ML; MG/100ML; MG/100ML
INJECTION, SOLUTION INTRAVENOUS CONTINUOUS
Status: DISCONTINUED | OUTPATIENT
Start: 2021-03-01 | End: 2021-03-03

## 2021-03-01 RX ORDER — ONDANSETRON 2 MG/ML
4 INJECTION INTRAMUSCULAR; INTRAVENOUS AS NEEDED
Status: DISCONTINUED | OUTPATIENT
Start: 2021-03-01 | End: 2021-03-01 | Stop reason: HOSPADM

## 2021-03-01 RX ORDER — KETOROLAC TROMETHAMINE 30 MG/ML
INJECTION, SOLUTION INTRAMUSCULAR; INTRAVENOUS
Status: COMPLETED
Start: 2021-03-01 | End: 2021-03-01

## 2021-03-01 RX ORDER — CAFFEINE AND SODIUM BENZOATE 125 MG/ML
INJECTION, SOLUTION INTRAMUSCULAR; INTRAVENOUS
Status: COMPLETED
Start: 2021-03-01 | End: 2021-03-01

## 2021-03-01 RX ORDER — ONDANSETRON 2 MG/ML
INJECTION INTRAMUSCULAR; INTRAVENOUS
Status: COMPLETED
Start: 2021-03-01 | End: 2021-03-01

## 2021-03-01 RX ORDER — SUMATRIPTAN 6 MG/.5ML
6 INJECTION, SOLUTION SUBCUTANEOUS ONCE
Status: COMPLETED | OUTPATIENT
Start: 2021-03-01 | End: 2021-03-01

## 2021-03-01 RX ORDER — DEXAMETHASONE SODIUM PHOSPHATE 4 MG/ML
VIAL (ML) INJECTION
Status: COMPLETED
Start: 2021-03-01 | End: 2021-03-01

## 2021-03-01 RX ORDER — IBUPROFEN 600 MG/1
600 TABLET ORAL ONCE AS NEEDED
Status: DISCONTINUED | OUTPATIENT
Start: 2021-03-01 | End: 2021-03-01 | Stop reason: HOSPADM

## 2021-03-01 RX ORDER — ONDANSETRON 2 MG/ML
4 INJECTION INTRAMUSCULAR; INTRAVENOUS ONCE
Status: COMPLETED | OUTPATIENT
Start: 2021-03-01 | End: 2021-03-01

## 2021-03-01 RX ORDER — DIPHENHYDRAMINE HYDROCHLORIDE 50 MG/ML
12.5 INJECTION INTRAMUSCULAR; INTRAVENOUS AS NEEDED
Status: DISCONTINUED | OUTPATIENT
Start: 2021-03-01 | End: 2021-03-01 | Stop reason: HOSPADM

## 2021-03-01 RX ORDER — DEXAMETHASONE SODIUM PHOSPHATE 4 MG/ML
4 VIAL (ML) INJECTION AS NEEDED
Status: DISCONTINUED | OUTPATIENT
Start: 2021-03-01 | End: 2021-03-01 | Stop reason: HOSPADM

## 2021-03-01 RX ORDER — ACETAMINOPHEN 500 MG
1000 TABLET ORAL ONCE AS NEEDED
Status: DISCONTINUED | OUTPATIENT
Start: 2021-03-01 | End: 2021-03-01 | Stop reason: HOSPADM

## 2021-03-01 RX ORDER — HYDROMORPHONE HYDROCHLORIDE 1 MG/ML
0.4 INJECTION, SOLUTION INTRAMUSCULAR; INTRAVENOUS; SUBCUTANEOUS EVERY 5 MIN PRN
Status: DISCONTINUED | OUTPATIENT
Start: 2021-03-01 | End: 2021-03-01 | Stop reason: HOSPADM

## 2021-03-01 RX ORDER — SUMATRIPTAN 6 MG/.5ML
INJECTION, SOLUTION SUBCUTANEOUS
Status: COMPLETED
Start: 2021-03-01 | End: 2021-03-01

## 2021-03-01 RX ORDER — MIDAZOLAM HYDROCHLORIDE 1 MG/ML
1 INJECTION INTRAMUSCULAR; INTRAVENOUS EVERY 5 MIN PRN
Status: DISCONTINUED | OUTPATIENT
Start: 2021-03-01 | End: 2021-03-01 | Stop reason: HOSPADM

## 2021-03-01 RX ORDER — MIDAZOLAM HYDROCHLORIDE 1 MG/ML
2 INJECTION INTRAMUSCULAR; INTRAVENOUS ONCE
Status: COMPLETED | OUTPATIENT
Start: 2021-03-01 | End: 2021-03-01

## 2021-03-01 RX ORDER — NALOXONE HYDROCHLORIDE 0.4 MG/ML
80 INJECTION, SOLUTION INTRAMUSCULAR; INTRAVENOUS; SUBCUTANEOUS AS NEEDED
Status: DISCONTINUED | OUTPATIENT
Start: 2021-03-01 | End: 2021-03-01 | Stop reason: HOSPADM

## 2021-03-01 RX ORDER — KETOROLAC TROMETHAMINE 30 MG/ML
30 INJECTION, SOLUTION INTRAMUSCULAR; INTRAVENOUS ONCE
Status: COMPLETED | OUTPATIENT
Start: 2021-03-01 | End: 2021-03-01

## 2021-03-01 RX ORDER — MIDAZOLAM HYDROCHLORIDE 1 MG/ML
INJECTION INTRAMUSCULAR; INTRAVENOUS
Status: COMPLETED
Start: 2021-03-01 | End: 2021-03-01

## 2021-03-01 RX ORDER — SODIUM CHLORIDE, SODIUM LACTATE, POTASSIUM CHLORIDE, CALCIUM CHLORIDE 600; 310; 30; 20 MG/100ML; MG/100ML; MG/100ML; MG/100ML
INJECTION, SOLUTION INTRAVENOUS CONTINUOUS
Status: DISCONTINUED | OUTPATIENT
Start: 2021-03-01 | End: 2021-03-01 | Stop reason: HOSPADM

## 2021-03-01 RX ORDER — CAFFEINE AND SODIUM BENZOATE 125 MG/ML
250 INJECTION, SOLUTION INTRAMUSCULAR; INTRAVENOUS ONCE
Status: COMPLETED | OUTPATIENT
Start: 2021-03-01 | End: 2021-03-01

## 2021-03-01 RX ORDER — DEXAMETHASONE SODIUM PHOSPHATE 4 MG/ML
8 VIAL (ML) INJECTION ONCE
Status: COMPLETED | OUTPATIENT
Start: 2021-03-01 | End: 2021-03-01

## 2021-03-01 RX ORDER — METOCLOPRAMIDE HYDROCHLORIDE 5 MG/ML
10 INJECTION INTRAMUSCULAR; INTRAVENOUS AS NEEDED
Status: DISCONTINUED | OUTPATIENT
Start: 2021-03-01 | End: 2021-03-01 | Stop reason: HOSPADM

## 2021-03-01 RX ADMIN — Medication 80 MG: at 07:36:00

## 2021-03-01 RX ADMIN — SODIUM CHLORIDE, SODIUM LACTATE, POTASSIUM CHLORIDE, CALCIUM CHLORIDE: 600; 310; 30; 20 INJECTION, SOLUTION INTRAVENOUS at 07:35:00

## 2021-03-01 RX ADMIN — DEXAMETHASONE SODIUM PHOSPHATE 8 MG: 4 MG/ML VIAL (ML) INJECTION at 06:59:00

## 2021-03-01 RX ADMIN — MIDAZOLAM HYDROCHLORIDE 2 MG: 1 INJECTION INTRAMUSCULAR; INTRAVENOUS at 07:52:00

## 2021-03-01 RX ADMIN — SUMATRIPTAN 6 MG: 6 INJECTION, SOLUTION SUBCUTANEOUS at 07:42:00

## 2021-03-01 RX ADMIN — SODIUM CHLORIDE, SODIUM LACTATE, POTASSIUM CHLORIDE, CALCIUM CHLORIDE: 600; 310; 30; 20 INJECTION, SOLUTION INTRAVENOUS at 06:58:00

## 2021-03-01 RX ADMIN — ONDANSETRON 4 MG: 2 INJECTION INTRAMUSCULAR; INTRAVENOUS at 06:59:00

## 2021-03-01 RX ADMIN — CAFFEINE AND SODIUM BENZOATE 250 MG: 125 INJECTION, SOLUTION INTRAMUSCULAR; INTRAVENOUS at 07:19:00

## 2021-03-01 RX ADMIN — KETOROLAC TROMETHAMINE 30 MG: 30 INJECTION, SOLUTION INTRAMUSCULAR; INTRAVENOUS at 07:00:00

## 2021-03-01 NOTE — ANESTHESIA POSTPROCEDURE EVALUATION
[address] Patient Status:  Outpatient   Age/Gender 42year old female MRN [id number]   Location [address] [facility] Attending Joshua Aragon MD   Hosp Day # 0 PCP Juan Jose Shepherd MD       Anesthesia Post-op N

## 2021-03-01 NOTE — PROGRESS NOTES
[facility] / [facility]  ECT Procedure Note    Juan Manuel Conner Patient Status:  Outpatient   Age/Gender 42year old female MRN [id number]   Location [address] [facility] Attending Meryl Shafer MD   Hosp Day # 0 PCP Mu

## 2021-03-01 NOTE — ANESTHESIA PREPROCEDURE EVALUATION
PRE-OP EVALUATION    Patient Name: Hector Villegas    Pre-op Diagnosis: * No surgery found *    * No surgery found *    * Surgery not found *    Pre-op vitals reviewed. There is no height or weight on file to calculate BMI.     Current medications BID    •  divalproex Sodium (DEPAKOTE) DR tab 500 mg, 500 mg, Oral, Nightly    •  [employer] Carbonate ER (LITHOBID) CR tab 600 mg, 600 mg, Oral, Nightly    •  influenza vaccine split quad (FLULAVAL) ages 6 months to 64 years inj 0.5ml, 0.5 mL, Intramuscular, left upper extremity     Intramural leiomyoma of uterus     Chronic post-traumatic stress disorder     Generalized anxiety disorder     Severe recurrent major depression (HCC)     Severe depressed bipolar I disorder without psychotic features ([facility] [address]     Bip by Holly Zimmer MD at [facility] MAIN OR   • Parmova 70 Left 2/17/2015    Performed by Holly Zimmer MD at [facility] MAIN OR   • [address] 70 Right 8/5/2014    Performed by Holly Zimmer MD at  Never Used    Alcohol use: No      Comment: rare      Drug use: No     Available pre-op labs reviewed.   Lab Results   Component Value Date    WBC 7.7 02/23/2021    RBC 3.90 02/23/2021    HGB 10.9 (L) 02/23/2021    HCT 35.3 02/23/2021    MCV 90.5 02/23/2021

## 2021-03-01 NOTE — PROGRESS NOTES
[facility] / [facility]  ECT History & Physical    Sury Bailey Patient Status:  Outpatient   Age/Gender 42year old female MRN [id number]   Location [address] [facility] Attending Catalino Bridges MD   Hosp Day # 0 PCP Darien Segal EGD x 5 times   • OTHER SURGICAL HISTORY      bile duct stent   • OTHER SURGICAL HISTORY  08/16/16    left elbow cyst   • RADIOFREQUENCY ABLATION OF SACROILIAC JOINT Right 10/2/2017    Performed by Aminah Haynes MD at [facility]   • TRANSFORAMINAL EPIDURAL - LUMBAR Left 6/4/2018    Performed by Gage Cardenas MD at [address] [address] Right 2/17/2015    Performed by Vivienne Wiley MD at [address] [address] abused: Not on file        Forced sexual activity: Not on file    Other Topics      Concerns:         Service: Not Asked        Blood Transfusions: Not Asked        Caffeine Concern: Yes          soda occasionally        Occupational Exposure: Not Kasia Butler  3/1/2021

## 2021-03-03 ENCOUNTER — ANESTHESIA EVENT (OUTPATIENT)
Dept: POSTOP/PACU | Facility: HOSPITAL | Age: 41
End: 2021-03-03
Payer: COMMERCIAL

## 2021-03-03 ENCOUNTER — ANESTHESIA (OUTPATIENT)
Dept: POSTOP/PACU | Facility: HOSPITAL | Age: 41
End: 2021-03-03
Payer: COMMERCIAL

## 2021-03-03 ENCOUNTER — HOSPITAL ENCOUNTER (OUTPATIENT)
Dept: POSTOP/PACU | Facility: HOSPITAL | Age: 41
Discharge: HOME | End: 2021-03-03
Attending: Other
Payer: COMMERCIAL

## 2021-03-03 VITALS
HEART RATE: 85 BPM | DIASTOLIC BLOOD PRESSURE: 90 MMHG | TEMPERATURE: 99 F | SYSTOLIC BLOOD PRESSURE: 117 MMHG | BODY MASS INDEX: 39 KG/M2 | HEIGHT: 63 IN | RESPIRATION RATE: 19 BRPM | OXYGEN SATURATION: 99 %

## 2021-03-03 DIAGNOSIS — F31.4 SEVERE DEPRESSED BIPOLAR I DISORDER WITHOUT PSYCHOTIC FEATURES (HCC): ICD-10-CM

## 2021-03-03 RX ORDER — HYDROCODONE BITARTRATE AND ACETAMINOPHEN 10; 325 MG/1; MG/1
1 TABLET ORAL AS NEEDED
Status: DISCONTINUED | OUTPATIENT
Start: 2021-03-03 | End: 2021-03-03 | Stop reason: HOSPADM

## 2021-03-03 RX ORDER — KETOROLAC TROMETHAMINE 30 MG/ML
INJECTION, SOLUTION INTRAMUSCULAR; INTRAVENOUS
Status: COMPLETED
Start: 2021-03-03 | End: 2021-03-03

## 2021-03-03 RX ORDER — ONDANSETRON 2 MG/ML
4 INJECTION INTRAMUSCULAR; INTRAVENOUS ONCE
Status: COMPLETED | OUTPATIENT
Start: 2021-03-03 | End: 2021-03-03

## 2021-03-03 RX ORDER — KETOROLAC TROMETHAMINE 30 MG/ML
30 INJECTION, SOLUTION INTRAMUSCULAR; INTRAVENOUS ONCE
Status: COMPLETED | OUTPATIENT
Start: 2021-03-03 | End: 2021-03-03

## 2021-03-03 RX ORDER — ONDANSETRON 2 MG/ML
4 INJECTION INTRAMUSCULAR; INTRAVENOUS AS NEEDED
Status: DISCONTINUED | OUTPATIENT
Start: 2021-03-03 | End: 2021-03-03 | Stop reason: HOSPADM

## 2021-03-03 RX ORDER — DEXAMETHASONE SODIUM PHOSPHATE 4 MG/ML
8 VIAL (ML) INJECTION ONCE
Status: COMPLETED | OUTPATIENT
Start: 2021-03-03 | End: 2021-03-03

## 2021-03-03 RX ORDER — HYDROCODONE BITARTRATE AND ACETAMINOPHEN 10; 325 MG/1; MG/1
2 TABLET ORAL AS NEEDED
Status: DISCONTINUED | OUTPATIENT
Start: 2021-03-03 | End: 2021-03-03 | Stop reason: HOSPADM

## 2021-03-03 RX ORDER — NALOXONE HYDROCHLORIDE 0.4 MG/ML
80 INJECTION, SOLUTION INTRAMUSCULAR; INTRAVENOUS; SUBCUTANEOUS AS NEEDED
Status: DISCONTINUED | OUTPATIENT
Start: 2021-03-03 | End: 2021-03-03 | Stop reason: HOSPADM

## 2021-03-03 RX ORDER — METOCLOPRAMIDE HYDROCHLORIDE 5 MG/ML
10 INJECTION INTRAMUSCULAR; INTRAVENOUS AS NEEDED
Status: DISCONTINUED | OUTPATIENT
Start: 2021-03-03 | End: 2021-03-03 | Stop reason: HOSPADM

## 2021-03-03 RX ORDER — CAFFEINE AND SODIUM BENZOATE 125 MG/ML
INJECTION, SOLUTION INTRAMUSCULAR; INTRAVENOUS
Status: COMPLETED
Start: 2021-03-03 | End: 2021-03-03

## 2021-03-03 RX ORDER — MIDAZOLAM HYDROCHLORIDE 1 MG/ML
1 INJECTION INTRAMUSCULAR; INTRAVENOUS EVERY 5 MIN PRN
Status: COMPLETED | OUTPATIENT
Start: 2021-03-03 | End: 2021-03-03

## 2021-03-03 RX ORDER — HYDROMORPHONE HYDROCHLORIDE 1 MG/ML
0.4 INJECTION, SOLUTION INTRAMUSCULAR; INTRAVENOUS; SUBCUTANEOUS EVERY 5 MIN PRN
Status: DISCONTINUED | OUTPATIENT
Start: 2021-03-03 | End: 2021-03-03 | Stop reason: HOSPADM

## 2021-03-03 RX ORDER — ONDANSETRON 2 MG/ML
INJECTION INTRAMUSCULAR; INTRAVENOUS
Status: COMPLETED
Start: 2021-03-03 | End: 2021-03-03

## 2021-03-03 RX ORDER — SUMATRIPTAN 6 MG/.5ML
INJECTION, SOLUTION SUBCUTANEOUS
Status: DISCONTINUED
Start: 2021-03-03 | End: 2021-03-03 | Stop reason: WASHOUT

## 2021-03-03 RX ORDER — CAFFEINE AND SODIUM BENZOATE 125 MG/ML
250 INJECTION, SOLUTION INTRAMUSCULAR; INTRAVENOUS ONCE
Status: COMPLETED | OUTPATIENT
Start: 2021-03-03 | End: 2021-03-03

## 2021-03-03 RX ORDER — SODIUM CHLORIDE, SODIUM LACTATE, POTASSIUM CHLORIDE, CALCIUM CHLORIDE 600; 310; 30; 20 MG/100ML; MG/100ML; MG/100ML; MG/100ML
INJECTION, SOLUTION INTRAVENOUS CONTINUOUS
Status: DISCONTINUED | OUTPATIENT
Start: 2021-03-03 | End: 2021-03-05

## 2021-03-03 RX ORDER — DEXAMETHASONE SODIUM PHOSPHATE 4 MG/ML
VIAL (ML) INJECTION
Status: COMPLETED
Start: 2021-03-03 | End: 2021-03-03

## 2021-03-03 RX ORDER — MIDAZOLAM HYDROCHLORIDE 1 MG/ML
INJECTION INTRAMUSCULAR; INTRAVENOUS
Status: COMPLETED
Start: 2021-03-03 | End: 2021-03-03

## 2021-03-03 RX ORDER — SODIUM CHLORIDE, SODIUM LACTATE, POTASSIUM CHLORIDE, CALCIUM CHLORIDE 600; 310; 30; 20 MG/100ML; MG/100ML; MG/100ML; MG/100ML
INJECTION, SOLUTION INTRAVENOUS CONTINUOUS
Status: DISCONTINUED | OUTPATIENT
Start: 2021-03-03 | End: 2021-03-03 | Stop reason: HOSPADM

## 2021-03-03 RX ADMIN — MIDAZOLAM HYDROCHLORIDE 1 MG: 1 INJECTION INTRAMUSCULAR; INTRAVENOUS at 07:49:00

## 2021-03-03 RX ADMIN — CAFFEINE AND SODIUM BENZOATE 250 MG: 125 INJECTION, SOLUTION INTRAMUSCULAR; INTRAVENOUS at 06:36:00

## 2021-03-03 RX ADMIN — ONDANSETRON 4 MG: 2 INJECTION INTRAMUSCULAR; INTRAVENOUS at 06:33:00

## 2021-03-03 RX ADMIN — DEXAMETHASONE SODIUM PHOSPHATE 8 MG: 4 MG/ML VIAL (ML) INJECTION at 06:34:00

## 2021-03-03 RX ADMIN — SODIUM CHLORIDE, SODIUM LACTATE, POTASSIUM CHLORIDE, CALCIUM CHLORIDE: 600; 310; 30; 20 INJECTION, SOLUTION INTRAVENOUS at 07:47:00

## 2021-03-03 RX ADMIN — SODIUM CHLORIDE, SODIUM LACTATE, POTASSIUM CHLORIDE, CALCIUM CHLORIDE: 600; 310; 30; 20 INJECTION, SOLUTION INTRAVENOUS at 06:25:00

## 2021-03-03 RX ADMIN — Medication 80 MG: at 07:33:00

## 2021-03-03 RX ADMIN — MIDAZOLAM HYDROCHLORIDE 1 MG: 1 INJECTION INTRAMUSCULAR; INTRAVENOUS at 07:54:00

## 2021-03-03 RX ADMIN — KETOROLAC TROMETHAMINE 30 MG: 30 INJECTION, SOLUTION INTRAMUSCULAR; INTRAVENOUS at 06:30:00

## 2021-03-03 NOTE — PROGRESS NOTES
[facility] / [facility]  ECT History & Physical    Jencielo Callejasi Patient Status:  Outpatient   Age/Gender 42year old female MRN [id number]   Location [address] [facility] Attending Maggie Song MD   Hosp Day # 0 PCP Juju Salcido metal   • OTHER SURGICAL HISTORY      EGD x 5 times   • OTHER SURGICAL HISTORY      bile duct stent   • OTHER SURGICAL HISTORY  08/16/16    left elbow cyst   • RADIOFREQUENCY ABLATION OF SACROILIAC JOINT Right 10/2/2017    Performed by Shilpa Harvey MD Viki Stinson MD at [facility]   • TRANSFORAMINAL EPIDURAL - LUMBAR Left 6/4/2018    Performed by Viki Stinson MD at [address] [address] Right 2/17/2015    Performed by Minal Kang

## 2021-03-03 NOTE — PROGRESS NOTES
[facility] / [facility]  ECT Procedure Note    Sodusradha Azarcatalina Patient Status:  Outpatient   Age/Gender 42year old female MRN [id number]   Location [address] [facility] Attending Amira Cruz MD   Hosp Day # 0 PCP Mu Arlen Salguero

## 2021-03-03 NOTE — ANESTHESIA PREPROCEDURE EVALUATION
PRE-OP EVALUATION    Patient Name: Issac Forman    Pre-op Diagnosis: * No surgery found *    * No surgery found *    * Surgery not found *    Pre-op vitals reviewed. There is no height or weight on file to calculate BMI.     Current medications oral suspension 30 mL, 30 mL, Oral, Q4H PRN    •  magnesium hydroxide (MILK OF MAGNESIA) 400 MG/5ML suspension 30 mL, 30 mL, Oral, Nightly PRN    •  clonazePAM (KLONOPIN) disintegrating tab 1 mg, 1 mg, Oral, TID    •  divalproex Sodium (DEPAKOTE) DR tab 25 radiculitis     Lumbar facet arthropathy     Unsteady gait     At risk for falling     Mass of left elbow     S/P excision of ganglion cyst     Weakness of left upper extremity     Radial neuropathy     Left upper extremity swelling     Left arm weakness ABLATION OF SACROILIAC JOINT Right 10/2/2017    Performed by Marcia Francis MD at [facility] MAIN OR   • Eloy Manzo Left 9/25/2017    Performed by Marcia Francis MD at [facility] MAIN OR   • Eloy Manzo • TRIGGER POINT INJECTION Right 2/17/2015    Performed by Wood Solis MD at [facility] MAIN OR     Social History    Tobacco Use      Smoking status: Former Smoker        Packs/day: 0.50        Years: 21.00        Pack years: 10.5        Types: Cigarettes

## 2021-03-03 NOTE — ANESTHESIA POSTPROCEDURE EVALUATION
[address] Patient Status:  Outpatient   Age/Gender 42year old female MRN [id number]   Location [address] [facility] Attending Joshua Aragon MD   Hosp Day # 0 PCP Juan Jose Shepherd MD       Anesthesia Post-op N

## 2021-03-05 ENCOUNTER — HOSPITAL ENCOUNTER (OUTPATIENT)
Dept: POSTOP/PACU | Facility: HOSPITAL | Age: 41
Discharge: HOME | End: 2021-03-05
Attending: Other
Payer: COMMERCIAL

## 2021-03-05 ENCOUNTER — ANESTHESIA EVENT (OUTPATIENT)
Dept: POSTOP/PACU | Facility: HOSPITAL | Age: 41
End: 2021-03-05
Payer: COMMERCIAL

## 2021-03-05 ENCOUNTER — ANESTHESIA (OUTPATIENT)
Dept: POSTOP/PACU | Facility: HOSPITAL | Age: 41
End: 2021-03-05
Payer: COMMERCIAL

## 2021-03-05 VITALS
HEART RATE: 82 BPM | HEIGHT: 63 IN | RESPIRATION RATE: 17 BRPM | DIASTOLIC BLOOD PRESSURE: 88 MMHG | TEMPERATURE: 98 F | OXYGEN SATURATION: 96 % | BODY MASS INDEX: 39 KG/M2 | SYSTOLIC BLOOD PRESSURE: 124 MMHG

## 2021-03-05 DIAGNOSIS — F31.4 SEVERE DEPRESSED BIPOLAR I DISORDER WITHOUT PSYCHOTIC FEATURES (HCC): ICD-10-CM

## 2021-03-05 RX ORDER — HYDROMORPHONE HYDROCHLORIDE 1 MG/ML
0.4 INJECTION, SOLUTION INTRAMUSCULAR; INTRAVENOUS; SUBCUTANEOUS EVERY 5 MIN PRN
Status: DISCONTINUED | OUTPATIENT
Start: 2021-03-05 | End: 2021-03-05 | Stop reason: HOSPADM

## 2021-03-05 RX ORDER — DEXAMETHASONE SODIUM PHOSPHATE 4 MG/ML
VIAL (ML) INJECTION
Status: COMPLETED
Start: 2021-03-05 | End: 2021-03-05

## 2021-03-05 RX ORDER — MIDAZOLAM HYDROCHLORIDE 1 MG/ML
INJECTION INTRAMUSCULAR; INTRAVENOUS
Status: COMPLETED
Start: 2021-03-05 | End: 2021-03-05

## 2021-03-05 RX ORDER — CAFFEINE AND SODIUM BENZOATE 125 MG/ML
250 INJECTION, SOLUTION INTRAMUSCULAR; INTRAVENOUS ONCE
Status: COMPLETED | OUTPATIENT
Start: 2021-03-05 | End: 2021-03-05

## 2021-03-05 RX ORDER — SODIUM CHLORIDE, SODIUM LACTATE, POTASSIUM CHLORIDE, CALCIUM CHLORIDE 600; 310; 30; 20 MG/100ML; MG/100ML; MG/100ML; MG/100ML
INJECTION, SOLUTION INTRAVENOUS CONTINUOUS
Status: DISCONTINUED | OUTPATIENT
Start: 2021-03-05 | End: 2021-03-05 | Stop reason: HOSPADM

## 2021-03-05 RX ORDER — ONDANSETRON 2 MG/ML
4 INJECTION INTRAMUSCULAR; INTRAVENOUS ONCE
Status: COMPLETED | OUTPATIENT
Start: 2021-03-05 | End: 2021-03-05

## 2021-03-05 RX ORDER — SODIUM CHLORIDE, SODIUM LACTATE, POTASSIUM CHLORIDE, CALCIUM CHLORIDE 600; 310; 30; 20 MG/100ML; MG/100ML; MG/100ML; MG/100ML
INJECTION, SOLUTION INTRAVENOUS CONTINUOUS
Status: DISCONTINUED | OUTPATIENT
Start: 2021-03-05 | End: 2021-03-07

## 2021-03-05 RX ORDER — CAFFEINE AND SODIUM BENZOATE 125 MG/ML
INJECTION, SOLUTION INTRAMUSCULAR; INTRAVENOUS
Status: COMPLETED
Start: 2021-03-05 | End: 2021-03-05

## 2021-03-05 RX ORDER — ONDANSETRON 2 MG/ML
INJECTION INTRAMUSCULAR; INTRAVENOUS
Status: COMPLETED
Start: 2021-03-05 | End: 2021-03-05

## 2021-03-05 RX ORDER — NALOXONE HYDROCHLORIDE 0.4 MG/ML
80 INJECTION, SOLUTION INTRAMUSCULAR; INTRAVENOUS; SUBCUTANEOUS AS NEEDED
Status: DISCONTINUED | OUTPATIENT
Start: 2021-03-05 | End: 2021-03-05 | Stop reason: HOSPADM

## 2021-03-05 RX ORDER — KETOROLAC TROMETHAMINE 30 MG/ML
30 INJECTION, SOLUTION INTRAMUSCULAR; INTRAVENOUS ONCE
Status: COMPLETED | OUTPATIENT
Start: 2021-03-05 | End: 2021-03-05

## 2021-03-05 RX ORDER — DEXAMETHASONE SODIUM PHOSPHATE 4 MG/ML
8 VIAL (ML) INJECTION ONCE
Status: COMPLETED | OUTPATIENT
Start: 2021-03-05 | End: 2021-03-05

## 2021-03-05 RX ORDER — MIDAZOLAM HYDROCHLORIDE 1 MG/ML
2 INJECTION INTRAMUSCULAR; INTRAVENOUS ONCE
Status: COMPLETED | OUTPATIENT
Start: 2021-03-05 | End: 2021-03-05

## 2021-03-05 RX ORDER — KETOROLAC TROMETHAMINE 30 MG/ML
INJECTION, SOLUTION INTRAMUSCULAR; INTRAVENOUS
Status: COMPLETED
Start: 2021-03-05 | End: 2021-03-05

## 2021-03-05 RX ADMIN — MIDAZOLAM HYDROCHLORIDE 2 MG: 1 INJECTION INTRAMUSCULAR; INTRAVENOUS at 08:05:00

## 2021-03-05 RX ADMIN — CAFFEINE AND SODIUM BENZOATE 250 MG: 125 INJECTION, SOLUTION INTRAMUSCULAR; INTRAVENOUS at 07:10:00

## 2021-03-05 RX ADMIN — DEXAMETHASONE SODIUM PHOSPHATE 8 MG: 4 MG/ML VIAL (ML) INJECTION at 06:41:00

## 2021-03-05 RX ADMIN — SODIUM CHLORIDE, SODIUM LACTATE, POTASSIUM CHLORIDE, CALCIUM CHLORIDE: 600; 310; 30; 20 INJECTION, SOLUTION INTRAVENOUS at 06:38:00

## 2021-03-05 RX ADMIN — ONDANSETRON 4 MG: 2 INJECTION INTRAMUSCULAR; INTRAVENOUS at 06:46:00

## 2021-03-05 RX ADMIN — KETOROLAC TROMETHAMINE 30 MG: 30 INJECTION, SOLUTION INTRAMUSCULAR; INTRAVENOUS at 06:39:00

## 2021-03-05 RX ADMIN — Medication 80 MG: at 07:55:00

## 2021-03-05 RX ADMIN — SODIUM CHLORIDE, SODIUM LACTATE, POTASSIUM CHLORIDE, CALCIUM CHLORIDE: 600; 310; 30; 20 INJECTION, SOLUTION INTRAVENOUS at 08:02:00

## 2021-03-05 NOTE — PROGRESS NOTES
[facility] / [facility]  ECT Procedure Note    Juan Manuel Conner Patient Status:  Outpatient   Age/Gender 42year old female MRN [id number]   Location [address] [facility] Attending Meryl Shafer MD   Hosp Day # 0 PCP Mu unremarkable    Post-ECT Medications:  Versed 2 mg IV    Goldman Charles

## 2021-03-05 NOTE — ANESTHESIA POSTPROCEDURE EVALUATION
[address]  [address] Patient Status:  Outpatient   Age/Gender 42year old female MRN [id number]   Location [address] [facility] Attending Nolvia Titus MD   Hosp Day # 0 PCP Gloria Santigao MD       Anesthesia Post-op N

## 2021-03-05 NOTE — PROGRESS NOTES
[facility] / [facility]  ECT History & Physical    Earney Said Patient Status:  Outpatient   Age/Gender 42year old female MRN [id number]   Location [address] [facility] Attending Nolvia Titus MD   Hosp Day # 0 PCP Verena Gutierrez SURGICAL HISTORY      right ankle surg x 3 times - no metal   • OTHER SURGICAL HISTORY      EGD x 5 times   • OTHER SURGICAL HISTORY      bile duct stent   • OTHER SURGICAL HISTORY  08/16/16    left elbow cyst   • RADIOFREQUENCY ABLATION OF SACROILIAC Matthew Sanchez TRANSFORAMINAL EPIDURAL - LUMBAR N/A 7/22/2019    Performed by Reynaldo Olivares MD at [address] [facility]   • TRANSFORAMINAL EPIDURAL - LUMBAR Left 6/4/2018    Performed by Reynaldo Olivares MD at [address] [facility] understand and agree to proceed with plan of care.     [place] [employer]

## 2021-03-05 NOTE — ANESTHESIA PREPROCEDURE EVALUATION
PRE-OP EVALUATION    Patient Name: Maritza Newton    Pre-op Diagnosis: * No surgery found *    * No surgery found *    * Surgery not found *    Pre-op vitals reviewed.   Temp: 97.6 °F (36.4 °C)  Pulse: 75  Resp: 18  BP: 99/73  SpO2: 98 %  There is no hei PRN    Or    •  acetaminophen (TYLENOL) tab 650 mg, 650 mg, Oral, Q4H PRN    •  Alum & Mag Hydroxide-Simeth (MAALOX) oral suspension 30 mL, 30 mL, Oral, Q4H PRN    •  magnesium hydroxide (MILK OF MAGNESIA) 400 MG/5ML suspension 30 mL, 30 mL, Oral, Nightly tendonitis     Peroneal tendon rupture     Orthopedic aftercare     Chronic pain     Sacroiliitis (HCC)     Lumbar radiculitis     Lumbar facet arthropathy     Unsteady gait     At risk for falling     Mass of left elbow     S/P excision of ganglion cyst SURGICAL HISTORY      bile duct stent   • OTHER SURGICAL HISTORY  08/16/16    left elbow cyst   • RADIOFREQUENCY ABLATION OF SACROILIAC JOINT Right 10/2/2017    Performed by Ye Solomon MD at [address] [place] [address] Dr Lee Manzo TRANSFORAMINAL EPIDURAL - LUMBAR Left 6/4/2018    Performed by Chapito Mendez MD at [address] [address] Right 2/17/2015    Performed by Amelia Don MD at [facility] MAIN OR     Social History    Tobacco Use      Smok

## 2021-03-09 ENCOUNTER — TELEPHONE (OUTPATIENT)
Dept: PAIN CLINIC | Facility: CLINIC | Age: 41
End: 2021-03-09

## 2021-03-09 DIAGNOSIS — M46.1 SACROILIITIS (HCC): ICD-10-CM

## 2021-03-09 DIAGNOSIS — M47.816 LUMBAR FACET ARTHROPATHY: ICD-10-CM

## 2021-03-09 DIAGNOSIS — M54.16 LUMBAR RADICULITIS: ICD-10-CM

## 2021-03-09 RX ORDER — CYCLOBENZAPRINE HCL 10 MG
10 TABLET ORAL 3 TIMES DAILY PRN
Qty: 90 TABLET | Refills: 0 | Status: CANCELLED | OUTPATIENT
Start: 2021-03-09

## 2021-03-10 ENCOUNTER — HOSPITAL ENCOUNTER (OUTPATIENT)
Dept: POSTOP/PACU | Facility: HOSPITAL | Age: 41
Discharge: HOME | End: 2021-03-10
Attending: Other
Payer: COMMERCIAL

## 2021-03-10 NOTE — TELEPHONE ENCOUNTER
Jing Brown MD  You 1 hour ago (10:47 AM)     Brandy Antoniojuan, the patient is still in [place] and I will not write Flexeril for her anymore.     Message text

## 2021-03-10 NOTE — TELEPHONE ENCOUNTER
Noted patient is being treated Inpatient. Dr Doris Healy to advise if refill for cyclobenzaprine is appropriate .        Per SUSANNE Mckeon's OV notes 2/9/2021-     ASSESSMENT AND PLAN:   Fibromyalgia  Cervical radiculitis  Cervical facet arthropathy  Lumbar radiculit

## 2021-03-11 ENCOUNTER — HOSPITAL ENCOUNTER (OUTPATIENT)
Dept: POSTOP/PACU | Facility: HOSPITAL | Age: 41
Discharge: HOME | End: 2021-03-11
Attending: Other
Payer: COMMERCIAL

## 2021-03-11 DIAGNOSIS — M46.1 SACROILIITIS (HCC): ICD-10-CM

## 2021-03-11 DIAGNOSIS — M47.816 LUMBAR FACET ARTHROPATHY: ICD-10-CM

## 2021-03-11 DIAGNOSIS — M54.16 LUMBAR RADICULITIS: ICD-10-CM

## 2021-03-11 RX ORDER — CYCLOBENZAPRINE HCL 10 MG
10 TABLET ORAL 3 TIMES DAILY PRN
Qty: 90 TABLET | Refills: 0 | Status: CANCELLED | OUTPATIENT
Start: 2021-03-11

## 2021-03-12 ENCOUNTER — APPOINTMENT (OUTPATIENT)
Dept: GENERAL RADIOLOGY | Age: 41
End: 2021-03-12
Attending: PHYSICIAN ASSISTANT
Payer: COMMERCIAL

## 2021-03-12 ENCOUNTER — HOSPITAL ENCOUNTER (OUTPATIENT)
Age: 41
Discharge: HOME | End: 2021-03-12
Payer: COMMERCIAL

## 2021-03-12 VITALS
DIASTOLIC BLOOD PRESSURE: 68 MMHG | OXYGEN SATURATION: 97 % | TEMPERATURE: 97 F | SYSTOLIC BLOOD PRESSURE: 110 MMHG | HEART RATE: 107 BPM | RESPIRATION RATE: 18 BRPM

## 2021-03-12 DIAGNOSIS — Z20.822 LAB TEST NEGATIVE FOR COVID-19 VIRUS: ICD-10-CM

## 2021-03-12 DIAGNOSIS — J45.901 ASTHMA EXACERBATION, MILD: Primary | ICD-10-CM

## 2021-03-12 LAB — SARS-COV-2 RNA RESP QL NAA+PROBE: NOT DETECTED

## 2021-03-12 PROCEDURE — 99213 OFFICE O/P EST LOW 20 MIN: CPT

## 2021-03-12 PROCEDURE — 99214 OFFICE O/P EST MOD 30 MIN: CPT

## 2021-03-12 PROCEDURE — 71046 X-RAY EXAM CHEST 2 VIEWS: CPT | Performed by: PHYSICIAN ASSISTANT

## 2021-03-12 RX ORDER — MONTELUKAST SODIUM 10 MG/1
10 TABLET ORAL NIGHTLY
Qty: 30 TABLET | Refills: 0 | Status: SHIPPED | OUTPATIENT
Start: 2021-03-12 | End: 2021-12-21

## 2021-03-12 RX ORDER — ALBUTEROL SULFATE 90 UG/1
2 AEROSOL, METERED RESPIRATORY (INHALATION) EVERY 4 HOURS PRN
Qty: 1 INHALER | Refills: 0 | Status: SHIPPED | OUTPATIENT
Start: 2021-03-12 | End: 2021-04-11

## 2021-03-12 NOTE — ED INITIAL ASSESSMENT (HPI)
Pt was an inpatient at [facility] x 6 weeks and was discharged 2 days ago. For the past 4 days, c/o fatigue, headaches, chest/back pain, sob, sinus congestion with blood nose and tongue feels \"burnt\". Pt was told it was anxiety. Pt here for covid testing.

## 2021-03-12 NOTE — TELEPHONE ENCOUNTER
See TE 3/9/2021    Aloysius Klinefelter, MD  You 1 hour ago (10:47 AM)   Dung Arteaga, the [facility] is still [facility] and I will not write Flexeril for her anymore. Message text          [employer] message sent to patient with Dr Lam Merchant recommendations.

## 2021-03-12 NOTE — ED PROVIDER NOTES
Patient Seen in: [facility]      History   Patient presents with:  Testing    Stated Complaint: headache body aches chest congestion     HPI/Subjective:   HPI    50-year-old female here for Covid testing.   Patient was recently inpatient a [place], [facility]   • ELBOW ARTHROSCOP,DIAGNOSTIC      left   • EXCISION OF LEFT ELBOW CYST/MASS Left 8/19/2016    Performed by Keyana Escobar MD at [address]   • L5 S1 TFESI #1 [id number] Left 6/4/2018    Performed by Chapito Mendez MD at [address] [place] [address] SEDATION Right 10/2/2017    Performed by Shabnam Lunsford MD at [facility] MAIN OR   • [address] Right 10/4/2016    Performed by Shabnam Lunsford MD at [facility] MAIN OR   • [address] [facility] Jaw: There is normal jaw occlusion. Right Ear: Tympanic membrane and external ear normal.      Left Ear: Tympanic membrane and external ear normal.      Nose: Rhinorrhea present. Rhinorrhea is clear. Mouth/Throat:      Lips: Pink. Mouth:  Mu effusions. BONES:  Normal for age. Metal jewelry over the upper anterior chest bilaterally. CONCLUSION:  Stable appearance since the previous examination from February 2021. Metal jewelry the upper anterior chest bilaterally.   According to the Soln  Inhale 2 puffs into the lungs every 4 (four) hours as needed for Wheezing. Qty: 1 Inhaler Refills: 0    Montelukast Sodium 10 MG Oral Tab  Take 1 tablet (10 mg total) by mouth nightly.   Qty: 30 tablet Refills: 0    !! - Potential duplicate medicatio

## 2021-03-18 DIAGNOSIS — M46.1 SACROILIITIS (HCC): ICD-10-CM

## 2021-03-18 DIAGNOSIS — M47.816 LUMBAR FACET ARTHROPATHY: ICD-10-CM

## 2021-03-18 DIAGNOSIS — M54.16 LUMBAR RADICULITIS: ICD-10-CM

## 2021-03-18 RX ORDER — CYCLOBENZAPRINE HCL 10 MG
10 TABLET ORAL 3 TIMES DAILY PRN
Qty: 90 TABLET | Refills: 0 | Status: SHIPPED | OUTPATIENT
Start: 2021-03-18 | End: 2021-07-27

## 2021-03-18 NOTE — TELEPHONE ENCOUNTER
Medication: flexeril 10 mg    Date of last refill: 02/1/21      Last office visit: 02/09/21  Due back to clinic per last office note:  After MRI  Date next office visit scheduled:  None         Last OV note recommendation:  ASSESSMENT AND PLAN:   Shannan

## 2021-04-06 DIAGNOSIS — M54.16 LUMBAR RADICULITIS: ICD-10-CM

## 2021-04-06 DIAGNOSIS — M46.1 SACROILIITIS (HCC): ICD-10-CM

## 2021-04-06 DIAGNOSIS — M47.816 LUMBAR FACET ARTHROPATHY: ICD-10-CM

## 2021-04-06 RX ORDER — CYCLOBENZAPRINE HCL 10 MG
TABLET ORAL
Qty: 90 TABLET | Refills: 0 | OUTPATIENT
Start: 2021-04-06

## 2021-06-04 ENCOUNTER — HOSPITAL ENCOUNTER (EMERGENCY)
Facility: HOSPITAL | Age: 41
Discharge: HOME | End: 2021-06-04
Attending: STUDENT IN AN ORGANIZED HEALTH CARE EDUCATION/TRAINING PROGRAM

## 2021-06-04 VITALS
HEART RATE: 78 BPM | TEMPERATURE: 98 F | BODY MASS INDEX: 39 KG/M2 | DIASTOLIC BLOOD PRESSURE: 85 MMHG | OXYGEN SATURATION: 98 % | WEIGHT: 222.69 LBS | RESPIRATION RATE: 16 BRPM | SYSTOLIC BLOOD PRESSURE: 127 MMHG

## 2021-06-04 DIAGNOSIS — K08.89 PAIN, DENTAL: ICD-10-CM

## 2021-06-04 DIAGNOSIS — K04.7 DENTAL INFECTION: Primary | ICD-10-CM

## 2021-06-04 PROCEDURE — 99283 EMERGENCY DEPT VISIT LOW MDM: CPT

## 2021-06-04 RX ORDER — ONDANSETRON 4 MG/1
4 TABLET, ORALLY DISINTEGRATING ORAL ONCE
Status: COMPLETED | OUTPATIENT
Start: 2021-06-04 | End: 2021-06-04

## 2021-06-04 RX ORDER — ONDANSETRON 4 MG/1
4 TABLET, ORALLY DISINTEGRATING ORAL EVERY 4 HOURS PRN
Qty: 10 TABLET | Refills: 0 | Status: SHIPPED | OUTPATIENT
Start: 2021-06-04 | End: 2021-06-11

## 2021-06-04 RX ORDER — ACETAMINOPHEN 500 MG
1000 TABLET ORAL ONCE
Status: COMPLETED | OUTPATIENT
Start: 2021-06-04 | End: 2021-06-04

## 2021-06-04 RX ORDER — CLINDAMYCIN HYDROCHLORIDE 150 MG/1
300 CAPSULE ORAL 3 TIMES DAILY
Qty: 30 CAPSULE | Refills: 0 | Status: SHIPPED | OUTPATIENT
Start: 2021-06-04 | End: 2021-06-14

## 2021-06-05 ENCOUNTER — APPOINTMENT (OUTPATIENT)
Dept: CT IMAGING | Facility: HOSPITAL | Age: 41
End: 2021-06-05
Attending: EMERGENCY MEDICINE

## 2021-06-05 PROCEDURE — 70450 CT HEAD/BRAIN W/O DYE: CPT | Performed by: EMERGENCY MEDICINE

## 2021-06-05 NOTE — ED QUICK NOTES
RN TALKED WITH FRIEND THAT BROUGHT PT TO [facility], FRIEND STATES PT \"FELL OFF THE FACE OF THE EARTH\" X 8 MONTHS. TODAY PT WAS SEEN BANGING ON FRIENDS CAR AND FLIPPING THINGS OVER IN HER YARD. FRIEND REPORTS AUDITORY AND VISUAL HALLUCINATIONS.  FRIEND ENTERED P

## 2021-06-05 NOTE — ED INITIAL ASSESSMENT (HPI)
PT TO ED FROM [facility], PT WENT TO [facility] FOR SI, WHILE AT [facility] PT PRESENTED WITH UNSTEADY GAIT AND CONFUSION . 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  MEDICS REPORTS FRIEND STA
(4) walks frequently

## 2021-06-05 NOTE — CM/SW NOTE
Provided patient resources for dental follow ups. I provided patient with a packet of dental resources and a referral sheet for the [address] [place] [address] for follow up. Patient agrees with this and call them on Monday.

## 2021-06-05 NOTE — ED QUICK NOTES
RESTRICTION OF RIGHTS FORMES FILLED OUT AND PLACED ON CHART; [place]ITON FILLED PUT BY [place] [employer] AND PLACED ON CHART

## 2021-06-05 NOTE — ED PROVIDER NOTES
Patient Seen in: [facility] Emergency Department      History   Patient presents with:  Dental Problem    Stated Complaint: dental issues, left sided facial pain and swelling    HPI/Subjective:   HPI    Patient is a 54-year-old female who presents e pain and swelling  Other systems are as noted in HPI. Constitutional and vital signs reviewed. All other systems reviewed and negative except as noted above.     Physical Exam     ED Triage Vitals [06/04/21 1845]   /85   Pulse 78   Resp 16   Tem medications    clindamycin HCl 150 MG Oral Cap  Take 2 capsules (300 mg total) by mouth 3 (three) times daily for 10 days. , Normal, Disp-30 capsule, R-0    ondansetron 4 MG Oral Tablet Dispersible  Take 1 tablet (4 mg total) by mouth every 4 (four) hours a

## 2021-06-05 NOTE — ED PROVIDER NOTES
Patient Seen in: [facility] Emergency Department      History   Patient presents with:  Eval-P    Stated Complaint: eval p     HPI/Subjective:   HPI    Patient was taken by her friend/long-term neighbor to [facility] after strange behavior.   The neighbors • COLONOSCOPY,REMV LESN,SNARE N/A 5/30/2014    Procedure: COLONOSCOPY, POSSIBLE BIOPSY, POSSIBLE POLYPECTOMY 41773;  Surgeon: Garcia Le MD;  Location: [address] [place] [address]. [address]      left   • OTHER      Mass in the tenderness, swelling, deformity   Skin: Covered with scratches, mild abrasions, covering most of the extremities, the scrape on the left elbow has surrounding erythema, looks like an early infection.   Neuro: Grossly intact to patient's baseline    ED Cours Authorization. The authorized Fact Sheet for Healthcare Providers for this assay is available upon request from the laboratory. CBC WITH DIFFERENTIAL WITH PLATELET    Narrative:      The following orders were created for panel order CBC With Differenti the hotline to make a report on the current situation. CANTS form being filled. Since crisis was able to make contact with the elder son, I do not believe we need to call police for a wellness check at this point.   Lithium and Depakote levels show patien

## 2021-06-05 NOTE — ED QUICK NOTES
FRIEND CALLED TO REPORT MORE INFORMATION: AT [employer]. 6AM THIS AM PT WAS REPORTED BEING SEEN ON A RING DEVICE KNOCKING ON NEIGHBORS WINDOWS AND FELL ON A FLOWER BED.  PT PICKED UP A GARDEN GNOME AND WAS TALKING TO IT. PT WAS YELLING AT THE CAR BECAUSE SHE SA

## 2021-06-05 NOTE — ED QUICK NOTES
Awakened, activated call light and stated that her food was missing. Assured that did not arrive with food.

## 2021-06-06 NOTE — ED NOTES
Referral packet prepared for [facility]. EDRN & EDMD made aware UA & drug screen results needed before referral can be faxed. Additionally, EKG needed.

## 2021-06-06 NOTE — ED QUICK NOTES
Pt moved into hallway to accommodate bed needs. Pt became verbally abusive with staff, pt called staff, \"fat fucking bitch\". Security called to bedside.

## 2021-06-06 NOTE — ED QUICK NOTES
PCT notified this RN that patient had soiled herself of feces. PCT assisted patient in cleaning herself.

## 2021-06-06 NOTE — PROGRESS NOTES
06/05/21 1939   COVID Exposure Risk Screening   Have you been practicing social distancing? Yes   Have you been wearing a mask when in the community? Yes  Hudson  states she \"tries to but forgets\".  States she will ask for a new mask when she goes some

## 2021-06-06 NOTE — ED NOTES
Spoke with Dr. Vu Gamboa to make him aware of pt's boarder status. He will see pt via telepsych this morning.

## 2021-06-06 NOTE — CERTIFICATION
Ref: 21  5/3-403, 5/3-602, 5/3-607, 5/3-610    5/3-702, 5/3-813, 5/4-306, 5/4-402, 5/4-403    5/4-405, 5/4-501, 5/4-611, 9/6-914   Inpatient Certificate  Re: Rafael Burnett    (name)     I personally informed the above-named individual of the pu reasonably expected based on his or her behavioral history, to suffer mental or emotional deterioration and is reasonably expected, after such deterioration, to meet the criteria of either paragraph one or paragraph two above;   []  An individual who is de

## 2021-06-06 NOTE — ED QUICK NOTES
Patient was banging on door, pt requesting something for anxiety. Medication administered per MAR. Pt also requesting for blanket and juice, which was provided. MD notified.

## 2021-06-06 NOTE — CONSULTS
[facility]  Psychiatric Evaluation    Maritza Newton YOB: 1980   Age/Gender 42year old female MRN [id number]   Location [address] [facility] PCP Wilbert Ashford MD     Date of Service:  6/6/2021     Allergies: drug abuse. She denies voices or visions. She denies suicide thoughts at this time. She admits to feeling depressed on most days. Past Psychiatric/Medication History:  By review of the notes, it is unclear of when was her last ECT.   She saw her out met with Jojo Sandhu over video because of the pandemic. She is wearing a hospital gown. She had to be set up to maintain alertness. She is moderately overweight. There are multiple tattoos. No acute injuries visualized.   She is cooperative with the interv ECT    Plan:  1. I filled out second certificate both on the basis of inability to care for self and risk of active harm toward self. 2.  Psychiatric hospitalization will be sought.   3.  In the meantime, we will make her home medications, including Depak

## 2021-06-06 NOTE — BH LEVEL OF CARE ASSESSMENT
Crisis Evaluation Assessment    Amelia Jones YOB: 1980   Age 42year old MRN [id number]   Location [address] [place] [address] Attending Jackie Barton MD      Patient's legal sex: female  Patient identifies as: female  Torsten Guillermo left vm    Tom Turner (son) [phone]: Cristino Francis states \"for the past 8 months it started going downhill. She is having difficulty with memory, basic tasks, and taking care of herself. \" He states in [place] it got worse and she was calling family members were talking about taking her to [facility] and she was agreeable at first then started denying things. Per ER RN notes: \"RN TALKED WITH FRIEND THAT BROUGHT PT TO [facility], FRIEND STATES PT \"FELL OFF THE FACE OF THE EARTH\" X 8 MONTHS.  TODAY PT WAS SEEN BANGING SI and denies making Si statement to friend. Per ER RN note, friend states Kirby Danielle said she wanted to take \"a bunch of pills and suffocate herself. \")  3.  Have you been thinking about how you might kill yourself? (past 30 days): Yes Taya Garcia denies SI and d to. She states she wasn't trying to hurt herself. She states she can't remember when this happened due to a poor memory. She states she remembers it left a bruise but doesn't remember where she hit herself.              Access to Means:  Access to [employer] really drink and doesn't remember the last time she drank alcohol. Her BAL was 0 at 4:13pm on 6/5/21. She reports only using prescription or OTC medication. She states she takes it as prescribed.  Her drug screen was not completed at the time of this assess past  Does anyone say or do something to you that makes you feel unsafe?: No  Have You Ever Been Harmed by a Partner/Caregiver?: No  Health Concerns r/t Abuse: No  Possible Abuse Reportable to[de-identified] Not appropriate for reporting to authorities    Mental Status because she thought people were trapped inside even though the car was empty. Brandon Mejias states she remembers thinking that someone was trapped inside of a car and states that her neighbors Ambreen Ana Lilia to mess with me. \"  Idania's son Heriberto Cali (25) states she has bee Precautions: Fall           Diagnoses:  Primary Psychiatric Diagnosis  F31.9 Bipolar disorder, unspecified    Secondary Psychiatric Diagnoses  F41.1 Generalized anxiety disorder    Pervasive Diagnoses    Pertinent Non-Psychiatric Diagnoses          Neomia Dense

## 2021-06-06 NOTE — ED QUICK NOTES
Pt sitting up on stretcher full of stool on legs and clothing.  Pt straight cathed 450cc clear yellow urine output

## 2021-06-06 NOTE — ED PROVIDER NOTES
Patient has been calm and cooperative throughout the shift. Continue to await placement. EKG    Rate, intervals and axes as noted on EKG Report.   Rate: 84  Rhythm: Sinus Rhythm  Reading: No evidence of acute ischemia

## 2021-06-07 NOTE — ED NOTES
Spoke with Sunny Montalvo from [place] who confirmed packet was received.  He states he will review and call back

## 2021-06-07 NOTE — ED QUICK NOTES
Care endorsed to [facility] FOR [facility]. Continues to await bed at [facility].

## 2021-06-07 NOTE — ED NOTES
Spoke with Osvaldo Goff from [facility] who states there are several packets before pt. He states it is unlikely pt will receive bed at [facility] today.

## 2021-06-07 NOTE — ED QUICK NOTES
On cart. Temperature reassessed. Offered breakfast, declined. Requested apple juice, which was given. Reinforced where call light available.

## 2021-06-08 NOTE — ED NOTES
Spoke with Dr. Anjel Becker, who advised that pt may be able to discharge home today, however need to confirm with collateral/spouse that he feels comfortable with pt discharging. LVM for spouse Ileana Salgado requesting call back asap to discuss further.

## 2021-06-08 NOTE — ED NOTES
Spoke with pt's son Audra Koyanagi, pt is to be discharged to home, per Audra Koyanagi someone (either father or son) will be picking her up at approximately 7:30- 8pm.     Pt son stated that he was under the impression that pt would be admitted to [facility], this writer shared w

## 2021-06-08 NOTE — ED NOTES
Left voicemail with pt's , phone rang once and immediately proceeded to voicemail     Left additional voicemail for pt's son

## 2021-06-08 NOTE — ED QUICK NOTES
Picked up patient. No report given. Medication due at 8am not given by prior RN. Pt resting on stretcher at this time.

## 2021-06-08 NOTE — CONSULTS
[facility]  Psychiatric Consult Progress Note    Luz Marina Doran YOB: 1980   Age/Gender 42year old female MRN [id number]   Location [address] [facility] Attending Ann Miranda MD     PCP Esperanza Flower activities, isolation, not wanting to leave the house, feeling on edge, racing thoughts, sleep disturbances.   Patient notes she has sporadic, fleeting passive SI, most recently while yesterday in the ED as she was disappointed for putting her family throug our conversation  Memory:  intact immediate, recent, remote and as evidenced by ability to present consistent history  Intellect: average and as evidenced by  ability to process clinical information  Language: normal  Insight: intact as evidenced by jeannat Oral Tab TAKE 2 TABLETS BY MOUTH EVERY NIGHT AT BEDTIME 60 tablet 0   • LITHIUM CARBONATE  MG Oral Tab CR TAKE 2 TABLETS(600 MG) BY MOUTH EVERY NIGHT 60 tablet 0   • divalproex Sodium 250 MG Oral Tab EC DR tab Take 1 (one) tablet by mouth twice daily at this time. Does not display inability to care for self. IP certificates lifted  2. Continue current outpatient medication regimen, counseled on importance for medication compliance. 3.  Return to ED or call 911 if feels danger to self or others.   4.

## 2021-06-08 NOTE — ED QUICK NOTES
Pt given lunch tray and afternoon medication. Pt ambulated to bathroom with steady gait. Pt requesting shower at this time.

## 2021-08-24 NOTE — TELEPHONE ENCOUNTER
Daughter called in on bettys behalf, stated she does have open wounds under her arm pit, with increased pain medication she rates her pain  10/10. Spoke with Dr. Jm Morales who suggests at this time for her to go to [facility] ER as that is where she is receiving radiation. Advised patient of info and also requested wound care, chelsey advised referral could possibly be put in after she is seen @ the er. Wilber Ellington Pt returning call.  Please CB

## 2021-11-01 NOTE — ED PROVIDER NOTES
Patient Seen in: Immediate [facility]      History   Patient presents with: Insomnia    Stated Complaint: no sleep since friday night    Subjective:   HPI    Patient has medical history as noted below.     He is on the number psychotropic medicatio Years: 21.00        Pack years: 10.5        Types: Cigarettes      Smokeless tobacco: Never Used      Tobacco comment: passive smoker    Vaping Use      Vaping Use: Never used    Alcohol use: No      Comment: rare    Drug use:  No             Review of Sy if she is unable to fall asleep tonight I have told her it is okay to take 2 mg of Ativan. Otherwise she may take 1 mg every 4 hours as needed for anxiety.       Understands that she needs to seek urgent evaluation if her symptoms do not significant improv

## 2021-11-01 NOTE — ED INITIAL ASSESSMENT (HPI)
Patient reports not sleeping since Friday. Reports similar episode in past. \"feeling wired\". Took her last dose of trazadone this afternoon without helping her to sleep.

## 2021-12-16 ENCOUNTER — HOSPITAL ENCOUNTER (INPATIENT)
Facility: HOSPITAL | Age: 41
LOS: 4 days | Discharge: HOME | DRG: 392 | End: 2021-12-21
Attending: EMERGENCY MEDICINE | Admitting: HOSPITALIST
Payer: COMMERCIAL

## 2021-12-16 ENCOUNTER — APPOINTMENT (OUTPATIENT)
Dept: CT IMAGING | Facility: HOSPITAL | Age: 41
DRG: 392 | End: 2021-12-16
Attending: EMERGENCY MEDICINE
Payer: COMMERCIAL

## 2021-12-16 DIAGNOSIS — R10.9 ABDOMINAL PAIN, ACUTE: Primary | ICD-10-CM

## 2021-12-16 DIAGNOSIS — E87.6 HYPOKALEMIA: ICD-10-CM

## 2021-12-16 DIAGNOSIS — K29.00 ACUTE GASTRITIS WITHOUT HEMORRHAGE, UNSPECIFIED GASTRITIS TYPE: ICD-10-CM

## 2021-12-16 PROBLEM — D72.829 LEUKOCYTOSIS: Status: ACTIVE | Noted: 2021-12-16

## 2021-12-16 PROBLEM — E87.1 HYPONATREMIA: Status: ACTIVE | Noted: 2021-12-16

## 2021-12-16 PROCEDURE — 99223 1ST HOSP IP/OBS HIGH 75: CPT | Performed by: INTERNAL MEDICINE

## 2021-12-16 PROCEDURE — 74177 CT ABD & PELVIS W/CONTRAST: CPT | Performed by: EMERGENCY MEDICINE

## 2021-12-16 RX ORDER — KETOROLAC TROMETHAMINE 30 MG/ML
30 INJECTION, SOLUTION INTRAMUSCULAR; INTRAVENOUS ONCE
Status: COMPLETED | OUTPATIENT
Start: 2021-12-16 | End: 2021-12-16

## 2021-12-16 RX ORDER — POTASSIUM CHLORIDE 20 MEQ/1
20 TABLET, EXTENDED RELEASE ORAL ONCE
Status: COMPLETED | OUTPATIENT
Start: 2021-12-16 | End: 2021-12-16

## 2021-12-16 RX ORDER — ONDANSETRON 4 MG/1
TABLET, ORALLY DISINTEGRATING ORAL
Status: COMPLETED
Start: 2021-12-16 | End: 2021-12-16

## 2021-12-16 RX ORDER — ONDANSETRON 4 MG/1
4 TABLET, ORALLY DISINTEGRATING ORAL ONCE
Status: COMPLETED | OUTPATIENT
Start: 2021-12-16 | End: 2021-12-16

## 2021-12-16 NOTE — ED INITIAL ASSESSMENT (HPI)
Pt here due to vomiting for the past 1.5 months. Pt stated that she has a Hx of pancreatitis. She stated that the vomiting has gotten very violent the past two days.

## 2021-12-17 PROBLEM — K29.00 ACUTE GASTRITIS WITHOUT HEMORRHAGE, UNSPECIFIED GASTRITIS TYPE: Status: ACTIVE | Noted: 2021-12-17

## 2021-12-17 PROCEDURE — 99232 SBSQ HOSP IP/OBS MODERATE 35: CPT | Performed by: HOSPITALIST

## 2021-12-17 RX ORDER — TRAZODONE HYDROCHLORIDE 50 MG/1
50 TABLET ORAL NIGHTLY PRN
Status: DISCONTINUED | OUTPATIENT
Start: 2021-12-17 | End: 2021-12-21

## 2021-12-17 RX ORDER — LORAZEPAM 0.5 MG/1
1 TABLET ORAL 4 TIMES DAILY PRN
Status: DISCONTINUED | OUTPATIENT
Start: 2021-12-17 | End: 2021-12-21

## 2021-12-17 RX ORDER — HYDROMORPHONE HYDROCHLORIDE 1 MG/ML
0.4 INJECTION, SOLUTION INTRAMUSCULAR; INTRAVENOUS; SUBCUTANEOUS EVERY 2 HOUR PRN
Status: DISCONTINUED | OUTPATIENT
Start: 2021-12-17 | End: 2021-12-18

## 2021-12-17 RX ORDER — SODIUM CHLORIDE 9 MG/ML
INJECTION, SOLUTION INTRAVENOUS CONTINUOUS
Status: ACTIVE | OUTPATIENT
Start: 2021-12-17 | End: 2021-12-17

## 2021-12-17 RX ORDER — LITHIUM CARBONATE 300 MG/1
600 TABLET, FILM COATED, EXTENDED RELEASE ORAL NIGHTLY
Status: DISCONTINUED | OUTPATIENT
Start: 2021-12-17 | End: 2021-12-21

## 2021-12-17 RX ORDER — ONDANSETRON 2 MG/ML
4 INJECTION INTRAMUSCULAR; INTRAVENOUS EVERY 4 HOURS PRN
Status: ACTIVE | OUTPATIENT
Start: 2021-12-17 | End: 2021-12-17

## 2021-12-17 RX ORDER — ENOXAPARIN SODIUM 100 MG/ML
40 INJECTION SUBCUTANEOUS DAILY
Status: DISCONTINUED | OUTPATIENT
Start: 2021-12-17 | End: 2021-12-21

## 2021-12-17 RX ORDER — DIVALPROEX SODIUM 250 MG/1
250 TABLET, DELAYED RELEASE ORAL NIGHTLY
Status: DISCONTINUED | OUTPATIENT
Start: 2021-12-17 | End: 2021-12-21

## 2021-12-17 RX ORDER — DIVALPROEX SODIUM 250 MG/1
250 TABLET, DELAYED RELEASE ORAL EVERY 8 HOURS SCHEDULED
Status: DISCONTINUED | OUTPATIENT
Start: 2021-12-17 | End: 2021-12-21

## 2021-12-17 RX ORDER — QUETIAPINE 50 MG/1
200 TABLET, FILM COATED ORAL NIGHTLY
Status: DISCONTINUED | OUTPATIENT
Start: 2021-12-17 | End: 2021-12-21

## 2021-12-17 RX ORDER — HYDROMORPHONE HYDROCHLORIDE 1 MG/ML
0.2 INJECTION, SOLUTION INTRAMUSCULAR; INTRAVENOUS; SUBCUTANEOUS EVERY 2 HOUR PRN
Status: DISCONTINUED | OUTPATIENT
Start: 2021-12-17 | End: 2021-12-18

## 2021-12-17 RX ORDER — ONDANSETRON 2 MG/ML
4 INJECTION INTRAMUSCULAR; INTRAVENOUS EVERY 6 HOURS PRN
Status: DISCONTINUED | OUTPATIENT
Start: 2021-12-17 | End: 2021-12-21

## 2021-12-17 RX ORDER — PANTOPRAZOLE SODIUM 40 MG/1
40 TABLET, DELAYED RELEASE ORAL
Status: DISCONTINUED | OUTPATIENT
Start: 2021-12-18 | End: 2021-12-17

## 2021-12-17 RX ORDER — PROCHLORPERAZINE EDISYLATE 5 MG/ML
5 INJECTION INTRAMUSCULAR; INTRAVENOUS EVERY 8 HOURS PRN
Status: DISCONTINUED | OUTPATIENT
Start: 2021-12-17 | End: 2021-12-21

## 2021-12-17 RX ORDER — METHYLPHENIDATE HYDROCHLORIDE 10 MG/1
20 TABLET ORAL DAILY
Status: DISCONTINUED | OUTPATIENT
Start: 2021-12-18 | End: 2021-12-21

## 2021-12-17 RX ORDER — SODIUM CHLORIDE 9 MG/ML
INJECTION, SOLUTION INTRAVENOUS CONTINUOUS
Status: DISCONTINUED | OUTPATIENT
Start: 2021-12-17 | End: 2021-12-19

## 2021-12-17 RX ORDER — DIPHENHYDRAMINE HCL 25 MG
25 CAPSULE ORAL EVERY 6 HOURS PRN
Status: DISCONTINUED | OUTPATIENT
Start: 2021-12-17 | End: 2021-12-21

## 2021-12-17 RX ORDER — HYDROMORPHONE HYDROCHLORIDE 1 MG/ML
0.8 INJECTION, SOLUTION INTRAMUSCULAR; INTRAVENOUS; SUBCUTANEOUS EVERY 2 HOUR PRN
Status: DISCONTINUED | OUTPATIENT
Start: 2021-12-17 | End: 2021-12-18

## 2021-12-17 NOTE — PLAN OF CARE
NURSING ADMISSION NOTE      Patient admitted via Cart  Oriented to room. Safety precautions initiated. Bed in low position. Call light in reach. Patient complains of LLQ abdominal pain. No c/o nausea overnight. Npo. Voiding frequently.   No bms

## 2021-12-17 NOTE — RESPIRATORY THERAPY NOTE
PATIENT HAS HISTORY OF DEENA. PATIENT REFUSES TO USE CPAP DURING HOSPITAL STAY. DEENA PROTOCOL IN CHART.

## 2021-12-17 NOTE — H&P
[facility]  History and Physical     Amelia Jones Patient Status:  Inpatient    1980 MRN [id number]   [facility] 3NW-A Attending Albin Zaragoza MD   Hosp Day # 0 PCP Kamini Nathan MD     Chief Complaint: N/V/D    Hist history. She has never used smokeless tobacco. She reports that she does not drink alcohol and does not use drugs.     Family History:   Family History   Problem Relation Age of Onset   • Breast Cancer Sister 45   • Ovarian Cancer Sister 45   • Other (lupus total) by mouth daily with breakfast., Disp: 30 tablet, Rfl: 0  Fluticasone Furoate-Vilanterol 100-25 MCG/INH Inhalation Aerosol Powder, Breath Activated, Inhale 1 puff into the lungs daily. , Disp: 28 each, Rfl: 0  Albuterol Sulfate  (90 BASE) MCG/A (based on SCr of 1.07 mg/dL (H)). No results for input(s): PTP, INR in the last 168 hours.     COVID-19 Lab Results    COVID-19  Lab Results   Component Value Date    COVID19 Not Detected 12/16/2021    COVID19 Not Detected 06/05/2021    COVID19 Not Detec

## 2021-12-17 NOTE — PLAN OF CARE
Problem: Patient/Family Goals  Goal: Patient/Family Long Term Goal  Description: Patient's Long Term Goal: GO HOME    Interventions:  - PLAN OF CARE, DIET, IVF, PAIN MEDS IF NEEDED  - See additional Care Plan goals for specific interventions  Outcome: Pr

## 2021-12-17 NOTE — ED QUICK NOTES
Orders for admission, patient is aware of plan and ready to go upstairs.  Any questions, please call ED RN [phone] at     Patient Covid vaccination status: Unvaccinated     COVID Test Ordered in ED: Rapid SARS-CoV-2 by PCR    COVID Suspicion at Admission: Low

## 2021-12-17 NOTE — ED QUICK NOTES
N/v/d began to worsen over the last day reports to have been constant.    Reports diarrhea has been constant over the last several months

## 2021-12-17 NOTE — ED PROVIDER NOTES
Patient Seen in: [facility] Emergency Department      History   Patient presents with:  Vomiting    Stated Complaint: vomiting for 1.5 months.     Subjective:   HPI    Patient is a 58-year-old female presents emergency room with a history of intermit HISTORY      bile duct stent   • OTHER SURGICAL HISTORY  08/16/16    left elbow cyst   • REMOVAL GALLBLADDER                  Social History    Tobacco Use      Smoking status: Former Smoker        Packs/day: 0.50        Years: 21.00        Pack years: 10. There is no cyanosis, clubbing, or edema appreciated. Pulses are 2+ and equal in all 4 extremities. NEURO: Patient is awake, alert and oriented to time place and person. Motor strength is 5 over 5 in all 4 extremities.  There are no gross motor or sensory 12/16/2021  CONCLUSION:  1.  There is air noted within the nondependent portion of the medial aspect of the gastric fundus suggestive of gastric pneumatosis which may be secondary to gastric emphysema but correlation with clinical symptoms to exclude emphys hospital for further care at this time. Patient admitted with no further new complaints.                            Disposition and Plan     Clinical Impression:  Abdominal pain, acute  (primary encounter diagnosis)  Acute gastritis without hemorrhage, uns

## 2021-12-17 NOTE — CONSULTS
Gastroenterology Initial Consultation    Rafael Burnett Patient Status:  Inpatient    1980 MRN [id number]   [facility] 3NW-A Attending Joseph Davis MD   Hosp Day # 0 PCP Sanam Mcmahon MD       Reason for Consultation/Chief C ankle surg x 3 times - no metal   • OTHER SURGICAL HISTORY      EGD x 5 times   • OTHER SURGICAL HISTORY      bile duct stent   • OTHER SURGICAL HISTORY  08/16/16    left elbow cyst   • REMOVAL GALLBLADDER       Family History   Problem Relation Age of Ons disturbance. Neurological: Denies frequent headaches, recent passing out, recent dizziness, convulsions/seizures.   Cardiovascular: Denies history of heart murmur, leg swelling, chest pain or pressure after eating or when upset, angina, irregular heart rat induration, nodules or tightening of examined abdominal skin  Psychiatric: Normal judgement and insight. Normal mood and affect. Data:  Relevant labs, imaging and medications reviewed.      Lab Results   Component Value Date    WBC 9.8 12/17/2021    [facility]

## 2021-12-17 NOTE — PAYOR COMM NOTE
--------------  ADMISSION REVIEW     Payor: Se[employer]  Subscriber #:  [id number]  Authorization Number: [id number]     Admit date: 12/17/21  Admit time:  1:28 AM       REVIEW DOCUMENTATION:     ED Provider Notes      ED Provider Notes signed stated complaint: vomiting for 1.5 months. Other systems are as noted in HPI. Constitutional and vital signs reviewed. All other systems reviewed and negative except as noted above.     Physical Exam     ED Triage Vitals [12/16/21 1742]   /76 Sodium 133 (*)     Potassium 3.4 (*)     Creatinine 1.07 (*)     Calcium, Total 10.2 (*)     Calculated Osmolality 274 (*)     Alkaline Phosphatase 107 (*)     Total Protein 8.4 (*)     All other components within normal limits   LIPASE - Abnormal; Notable Raghu Edmondson MD on 12/16/2021 at 11:04 PM             MDM      00:13 patient sitting back and breathing easily in no apparent distress this time. The patient has no other new complaints at this time.   The patient's case has been discussed with Dr. Will Corrigan 11/2/2021          ICD-10-CM Noted POA    * (Principal) Abdominal pain, acute R10.9 12/16/2021 Unknown    Hypokalemia E87.6 12/16/2021 Yes    Hyponatremia E87.1 12/16/2021 Yes    Leukocytosis D72.829 12/16/2021 Yes                         Signed by Natasha Walters Alert and oriented x 3. HEENT: Normocephalic atraumatic. Moist mucous membranes. EOM-I. PERRLA. Anicteric. Neck: No lymphadenopathy. No JVD. No carotid bruits. Respiratory: Clear to auscultation bilaterally. No wheezes. No rhonchi.   Cardiovascular: S1, hypokalemia, hyponatremia due to dehydration   - replace   - IVF  # depression/anxiety     Quality:  · DVT Prophylaxis: lovenox   · CODE status: full  · Burgos: none  · If COVID testing is negative, may discontinue isolation: yes     Plan of care discussed Weight O2 Device O2 Flow Rate (L/min) Who    12/17/21 0858 98.3 °F (36.8 °C) 85 18 103/66 98 % — None (Room air) — AW    12/17/21 0630 98.8 °F (37.1 °C) 97 16 128/81 98 % — None (Room air) — GB    12/17/21 0133 98.1 °F (36.7 °C) 100 16 110/81 100 % — None

## 2021-12-17 NOTE — PROGRESS NOTES
[facility]  Progress Note     Ramos Bel Patient Status:  Inpatient    1980 MRN [id number]   [facility] 3NW-A Attending Niharika Orellana MD   Hosp Day # 0 PCP Jayla Kent MD     Chief Complaint: abd pain n/v    S: Avie Pod gastric pneumatosis  - plan as above      # hypokalemia, hyponatremia due to dehydration   - replace   - IVF  # depression/anxiety      Quality:  · DVT Prophylaxis: lovenox   · CODE status: full  · Burgos: none  · If COVID testing is negative, may discontin

## 2021-12-18 PROCEDURE — 99232 SBSQ HOSP IP/OBS MODERATE 35: CPT | Performed by: INTERNAL MEDICINE

## 2021-12-18 RX ORDER — ACETAMINOPHEN 500 MG
1000 TABLET ORAL EVERY 6 HOURS PRN
Status: DISCONTINUED | OUTPATIENT
Start: 2021-12-18 | End: 2021-12-18

## 2021-12-18 RX ORDER — HYDROMORPHONE HYDROCHLORIDE 1 MG/ML
0.2 INJECTION, SOLUTION INTRAMUSCULAR; INTRAVENOUS; SUBCUTANEOUS
Status: DISCONTINUED | OUTPATIENT
Start: 2021-12-18 | End: 2021-12-20

## 2021-12-18 RX ORDER — HYDROCODONE BITARTRATE AND ACETAMINOPHEN 5; 325 MG/1; MG/1
1 TABLET ORAL EVERY 6 HOURS PRN
Status: DISCONTINUED | OUTPATIENT
Start: 2021-12-18 | End: 2021-12-21

## 2021-12-18 RX ORDER — PANTOPRAZOLE SODIUM 40 MG/1
40 TABLET, DELAYED RELEASE ORAL
Status: DISCONTINUED | OUTPATIENT
Start: 2021-12-18 | End: 2021-12-21

## 2021-12-18 RX ORDER — ACETAMINOPHEN 500 MG
500 TABLET ORAL EVERY 6 HOURS PRN
Status: DISCONTINUED | OUTPATIENT
Start: 2021-12-18 | End: 2021-12-21

## 2021-12-18 RX ORDER — HYDROMORPHONE HYDROCHLORIDE 1 MG/ML
0.4 INJECTION, SOLUTION INTRAMUSCULAR; INTRAVENOUS; SUBCUTANEOUS
Status: DISCONTINUED | OUTPATIENT
Start: 2021-12-18 | End: 2021-12-20

## 2021-12-18 NOTE — PROGRESS NOTES
[facility]  Report of GI Progress Note    Amelia Jones Patient Status:  Inpatient    1980 MRN [id number]   [facility] 3NW-A Attending Cj Boyer MD   Hosp Day # 1 PCP Kamini Nathan MD     Date of Admission:  2021 12/16  Cyclobenzaprine         OTHER (SEE COMMENTS)    Comment:due to amnestic episodes of delirium             Patient denies 12/16      Physical Exam:   Blood pressure 110/69, pulse 80, temperature 98.3 °F (36.8 °C), temperature source Oral, resp.  rate 1 Finalized by (CST): Kendall Foster MD on 12/16/2021 at 11:04 PM            Impression:   Carleen Price is a 42year-old female being seen in consultation for nausea, vomiting, diarrhea and gastric pneumatosis.  Given her initial leukocytosis her GI comp

## 2021-12-18 NOTE — PLAN OF CARE
Problem: Patient/Family Goals  Goal: Patient/Family Long Term Goal  Description: Patient's Long Term Goal:     Interventions:  -   - See additional Care Plan goals for specific interventions  Outcome: Progressing  Goal: Patient/Family Short Term Goal  Brenda Aguirre w/pt and caregiver  - Include patient/family/discharge partner in discharge planning  - Arrange for needed discharge resources and transportation as appropriate  - Identify discharge learning needs (meds, wound care, etc)  - Arrange for interpreters to ass appropriate  - Fluid restriction as ordered  - Instruct patient on fluid and nutrition restrictions as appropriate  Outcome: Progressing  Goal: Hemodynamic stability and optimal renal function maintained  Description: INTERVENTIONS:  - Monitor labs and ass

## 2021-12-18 NOTE — PLAN OF CARE
5747  This RN received call from pharmacy stating patient was down there asking for clove oil. Pt brought back by security. Pt very tearful and apologetic stating she doesn't remember leaving to go there and that happens to her at home as well.   Charge n

## 2021-12-18 NOTE — PROGRESS NOTES
[facility]  Progress Note     Irais Mtz Patient Status:  Inpatient    1980 MRN [id number]   [facility] 3NW-A Attending Pako Jarvis MD   Hosp Day # 1 PCP Maru Kurtz MD     Chief Complaint: abd pain n/v    S: Vanice Gaucher • fluticasone furoate-vilanterol  1 puff Inhalation Daily   • Lithium Carbonate ER  600 mg Oral Nightly   • methylphenidate  20 mg Oral Daily   • QUEtiapine  200 mg Oral Nightly       ASSESSMENT / PLAN:     # intractable nausea and vomiting   # gastroent

## 2021-12-19 PROCEDURE — 99232 SBSQ HOSP IP/OBS MODERATE 35: CPT | Performed by: INTERNAL MEDICINE

## 2021-12-19 RX ORDER — AMOXICILLIN AND CLAVULANATE POTASSIUM 875; 125 MG/1; MG/1
875 TABLET, FILM COATED ORAL EVERY 12 HOURS SCHEDULED
Status: DISCONTINUED | OUTPATIENT
Start: 2021-12-19 | End: 2021-12-20

## 2021-12-19 NOTE — PROGRESS NOTES
[facility]  Report of GI Progress Note    Rachel Pino Patient Status:  Inpatient    1980 MRN [id number]   [facility] 3NW-A Attending Dave Wyatt, DO   Hosp Day # 2 PCP Jayla Kent MD     Date of Admission:  2021 12/16  Cyclobenzaprine         OTHER (SEE COMMENTS)    Comment:due to amnestic episodes of delirium             Patient denies 12/16      Physical Exam:   Blood pressure 113/46, pulse 87, temperature 98.7 °F (37.1 °C), temperature source Oral, resp.  rate 1 patients. Despite her ongoing complaints of chronic abdominal pain (generalized), she is now tolerating diet well and has no recurrent nausea, vomiting, or diarrhea.     Recommendations:  1. Change PPI to oral  2. Advance to soft diet  3.  Chronic pain jhon

## 2021-12-19 NOTE — PROGRESS NOTES
[facility]  Progress Note     Eaglefrank Pepper Patient Status:  Inpatient    1980 MRN [id number]   [facility] 3NW-A Attending Jesika Tovar MD   Hosp Day # 2 PCP Tanika Aquino MD     Chief Complaint: abd pain n/v    S: Sonia Crook Daily   • Lithium Carbonate ER  600 mg Oral Nightly   • methylphenidate  20 mg Oral Daily   • QUEtiapine  200 mg Oral Nightly       ASSESSMENT / PLAN:     # intractable nausea and vomiting, Abdominal pain  # gastroenteritis, possibly viral   - soft diet  -

## 2021-12-19 NOTE — PLAN OF CARE
Problem: Patient/Family Goals  Goal: Patient/Family Long Term Goal  Description: Patient's Long Term Goal:       Interventions:  -     - See additional Care Plan goals for specific interventions  Outcome: Progressing  Goal: Patient/Family Short Term Goal discharge w/pt and caregiver  - Include patient/family/discharge partner in discharge planning  - Arrange for needed discharge resources and transportation as appropriate  - Identify discharge learning needs (meds, wound care, etc)  - Arrange for interpret results as appropriate  - Fluid restriction as ordered  - Instruct patient on fluid and nutrition restrictions as appropriate  Outcome: Progressing  Goal: Hemodynamic stability and optimal renal function maintained  Description: INTERVENTIONS:  - Monitor l

## 2021-12-19 NOTE — PLAN OF CARE
Pt A&O x's 4, forgetful and anxious at times. Resting comfortably on rm air. Tele NSR, pt declining SCDs. No vomiting, pt did c/o some nausea this am, relieved by zofran. Main complaints of pain seem to be tooth and mouth pain.   Dr. Bergeron Burn aware and wicho

## 2021-12-20 PROCEDURE — 99232 SBSQ HOSP IP/OBS MODERATE 35: CPT | Performed by: INTERNAL MEDICINE

## 2021-12-20 RX ORDER — DEXAMETHASONE SODIUM PHOSPHATE 4 MG/ML
4 VIAL (ML) INJECTION ONCE
Status: COMPLETED | OUTPATIENT
Start: 2021-12-20 | End: 2021-12-20

## 2021-12-20 RX ORDER — HYDROMORPHONE HYDROCHLORIDE 1 MG/ML
0.2 INJECTION, SOLUTION INTRAMUSCULAR; INTRAVENOUS; SUBCUTANEOUS EVERY 2 HOUR PRN
Status: DISCONTINUED | OUTPATIENT
Start: 2021-12-20 | End: 2021-12-21

## 2021-12-20 NOTE — PAYOR COMM NOTE
--------------  CONTINUED STAY REVIEW---REQUESTING ADDITIONAL DAYS 12/18, 12/19, 12/20      Payor: [employer]  Subscriber #:  [id number]  Authorization Number: [id number]     Admit date: 12/17/21  Admit time:  1:28 AM    Admitting Physician: SUSANNE • enoxaparin  40 mg Subcutaneous Daily   • pantoprazole (PROTONIX) IV push  40 mg Intravenous Q24H   • divalproex  250 mg Oral Q8H [facility] HOME   • divalproex  250 mg Oral Nightly   • fluticasone furoate-vilanterol  1 puff Inhalation Daily   • Lithium Carbonate E 12/16/21 2122 12/17/21  0530   WBC 15.9* 9.8   HGB 14.7 13.0   MCV 92.2 94.3   .0 251. 0              Recent Labs   Lab 12/16/21 2122 12/17/21  0530   GLU 88 78   BUN 9 8   CREATSERUM 1.07* 0.96   GFRAA 74 84   GFRNAA 64 73   CA 10.2* 9.2   ALB 4.1 Mally Sheikh       12/20  Chief Complaint: abd pain n/v     S: Patient feels burning in her stomach and tooth pain is worse with swelling, Nauseous, minimal PO intake      Review of Systems:   10 point ROS completed and was negative, except for pertinent positi QUEtiapine  200 mg Oral Nightly         ASSESSMENT / PLAN:      # intractable nausea and vomiting, Abdominal pain  # gastroenteritis, possibly viral   - soft diet  - antiemetic   - PPI  - GI consult possible endoscopy   - stool studies negative  - supporti furoate-vilanterol (BREO ELLIPTA) 100-25 MCG/INH inhaler 1 puff     Date Action Dose Route User    12/20/2021 6917 Given 1 puff Inhalation Shelia Adair, RN      HYDROcodone-acetaminophen (NORCO) 5-325 MG per tab 1 tablet     Date Action Dose Route User 97 % — — — JF    12/19/21 1620 99.5 °F (37.5 °C) 91 16 — — — — — JF    12/19/21 1300 — — — 132/75 — — — — CD    12/19/21 1149 98.6 °F (37 °C) 82 16 121/76 — — None (Room air) — CD    12/19/21 0825 98.7 °F (37.1 °C) 87 18 113/46 95 % — None (Room air) — JF

## 2021-12-20 NOTE — PROGRESS NOTES
[facility] Gastroenterology Progress Note    CC: diarrhea, gastric pneumatosis      S: Pt had nausea this am with omelet; not sure what she ate yday and how she tolerated, some improvement today in chronic abd pain; having lots of tooth pain and swell

## 2021-12-20 NOTE — PLAN OF CARE
A&O x4, can be forgetful. Lung sounds diminished. Pt states having pain all over, rating it a 7/10, pain medications given. Bowel sounds active, pt tolerating well to diet. Lost access to right hand IV, new one placed in left forearm.  Poc discussed, pt justus by assessment.  - Educate pt/family on patient safety including physical limitations  - Instruct pt to call for assistance with activity based on assessment  - Modify environment to reduce risk of injury  - Provide assistive devices as appropriate  - Consi ordered  - Assess for signs and symptoms of hyperglycemia and hypoglycemia  - Administer ordered medications to maintain glucose within target range  - Assess barriers to adequate nutritional intake and initiate nutrition consult as needed  - Instruct adeola

## 2021-12-20 NOTE — PROGRESS NOTES
[facility]PITAL[facility]  Progress Note     Austen Ambrose Patient Status:  Inpatient    1980 MRN [id number]   [facility] 3NW-A Attending Collette Ratliff MD   Hosp Day # 3 PCP Teressa Velasco MD     Chief Complaint: abd pain n/v    S: Beth Overcast divalproex  250 mg Oral Nightly   • fluticasone furoate-vilanterol  1 puff Inhalation Daily   • Lithium Carbonate ER  600 mg Oral Nightly   • methylphenidate  20 mg Oral Daily   • QUEtiapine  200 mg Oral Nightly       ASSESSMENT / PLAN:     # intractable n

## 2021-12-21 VITALS
SYSTOLIC BLOOD PRESSURE: 107 MMHG | HEIGHT: 62 IN | HEART RATE: 84 BPM | BODY MASS INDEX: 36.62 KG/M2 | WEIGHT: 199 LBS | OXYGEN SATURATION: 98 % | TEMPERATURE: 98 F | RESPIRATION RATE: 14 BRPM | DIASTOLIC BLOOD PRESSURE: 61 MMHG

## 2021-12-21 PROCEDURE — 99239 HOSP IP/OBS DSCHRG MGMT >30: CPT | Performed by: INTERNAL MEDICINE

## 2021-12-21 RX ORDER — HYDROCODONE BITARTRATE AND ACETAMINOPHEN 5; 325 MG/1; MG/1
1 TABLET ORAL EVERY 12 HOURS PRN
Qty: 10 TABLET | Refills: 0 | Status: SHIPPED | OUTPATIENT
Start: 2021-12-21

## 2021-12-21 RX ORDER — PANTOPRAZOLE SODIUM 40 MG/1
40 TABLET, DELAYED RELEASE ORAL
Qty: 30 TABLET | Refills: 0 | Status: SHIPPED | OUTPATIENT
Start: 2021-12-22

## 2021-12-21 RX ORDER — AMOXICILLIN AND CLAVULANATE POTASSIUM 875; 125 MG/1; MG/1
1 TABLET, FILM COATED ORAL 2 TIMES DAILY
Qty: 10 TABLET | Refills: 0 | Status: SHIPPED | OUTPATIENT
Start: 2021-12-21 | End: 2021-12-26

## 2021-12-21 NOTE — PLAN OF CARE
Upon assessment pt is a&o x4, VSS, afebrile. Pt denies calf pain, chest pain and MIGNON. RA, . Tele- NSR. On Lovenox subQ. Soft diet, prn zofran given for nausea. Voids freely. Up ad vladislav. Pt c/o pain - prn pain medication administered per mar.  IV abx infus discharge planning  - Arrange for needed discharge resources and transportation as appropriate  - Identify discharge learning needs (meds, wound care, etc)  - Arrange for interpreters to assist at discharge as needed  - Consider post-discharge preferences fluid and nutrition restrictions as appropriate  Outcome: Progressing  Goal: Hemodynamic stability and optimal renal function maintained  Description: INTERVENTIONS:  - Monitor labs and assess for signs and symptoms of volume excess or deficit  - Monitor i

## 2021-12-21 NOTE — PROGRESS NOTES
[facility] Gastroenterology Progress Note    CC: diarrhea, gastric pneumatosis      S: Pt had nausea yday but ate chicken marsala with potatoes/gravy, despite discussions to try more bland and less fatty foods     O: /78 (BP Location: Left arm)

## 2021-12-21 NOTE — PLAN OF CARE
Pt discharged home. Scripts called in for antibiotic, pain meds, and protonix. Pt is aware they need to be picked up at pharmacy. Pt is aware of home medications to resume and changes made to meds.  This RN printed out infor on soft, bland diet and pt ha appropriate  Outcome: Progressing     Problem: SAFETY ADULT - FALL  Goal: Free from fall injury  Description: INTERVENTIONS:  - Assess pt frequently for physical needs  - Identify cognitive and physical deficits and behaviors that affect risk of falls.   - emptying  Outcome: Progressing     Problem: METABOLIC/FLUID AND ELECTROLYTES - ADULT  Goal: Glucose maintained within prescribed range  Description: INTERVENTIONS:  - Monitor Blood Glucose as ordered  - Assess for signs and symptoms of hyperglycemia and hy See additional Care Plan goals for specific interventions  Outcome: Progressing     Problem: Patient/Family Goals  Goal: Patient/Family Long Term Goal  Description: Patient's Long Term Goal:     Interventions:    - See additional Care Plan goals for specif

## 2021-12-21 NOTE — PLAN OF CARE
Upon assessment, VSS, afebrile. Pt c/o \"dull\" abdominal pain and worsening left jaw pain. Norco given as ordered initially, then Dilaudid. Dr. Jacob Jerome notified. No NSAIDs at this time, will try ice pack. Left jaw noted with swelling and firmness.  Pt with influences on pain and pain management  - Manage/alleviate anxiety  - Utilize distraction and/or relaxation techniques  - Monitor for opioid side effects  - Notify MD/LIP if interventions unsuccessful or patient reports new pain  - Anticipate increased antwon

## 2021-12-21 NOTE — DISCHARGE SUMMARY
[facility] HOSPITAL[facility]  DISCHARGE SUMMARY     Kelsey Katz Patient Status:  Inpatient    1980 MRN [id number]   [facility] 3NW-A Attending No att. providers found   Hosp Day # 4 PCP Ld Wilkerson MD     Date of Admission: 2021 supportively, GI evaluated and followed along, as patient symptomatically improved and able to tolerate diet with supportive management EGD was held. GI considering doing this as an outpatient.   Patient has been dealing with a tooth infection for quite a puffs into the lungs as needed for Wheezing.    Refills: 0     divalproex 250 MG Tbec DR tab  Commonly known as: DEPAKOTE      TAKE 1 TABLET BY MOUTH TWICE DAILY AND 2 TABLET BY MOUTH EVERY NIGHT AT BEDTIME   Quantity: 360 tablet  Refills: 0     ferrous sul 70695  [phone]    Call  As needed      Vital signs:  Temp:  [97.6 °F (36.4 °C)-98.6 °F (37 °C)] 97.8 °F (36.6 °C)  Pulse:  [78-98] 84  Resp:  [14-20] 14  BP: (103-122)/(50-80) 107/61    Physical Exam:    General: No acute distress.    Respiratory: Pat

## 2021-12-23 NOTE — PAYOR COMM NOTE
--------------Connie Ca BACK APPROVED DAYS -[phone]    DISCHARGE REVIEW    Payor: [employer]  Subscriber #:  [id number]  Authorization Number: [id number]     Admit date: 12/17/21  Admit time:   1:28 AM  Discharge Date: 12/21/2021  2:22 Follow-Up Recommendation:  LACE 29-58:  Moderate Risk of readmission after discharge from the hospital.        Chief Complaint: N/V/D     History of Present Illness: Kp Bond is a 42year old female with anxiety, depression, migraine, chronic pancr Quantity: 10 tablet  Refills: 0     pantoprazole 40 MG Tbec  Commonly known as: PROTONIX  Start taking on: December 22, 2021      Take 1 tablet (40 mg total) by mouth every morning before breakfast.   Quantity: 30 tablet  Refills: 0        CHANGE how you t Take 1 tablet (50 mg total) by mouth nightly as needed.  AT BEDTIME   Quantity: 30 tablet  Refills: 5        STOP taking these medications    montelukast 10 MG Tabs  Commonly known as: SINGULAIR              Where to Get Your Medications      These medicati

## 2022-07-28 ENCOUNTER — HOSPITAL ENCOUNTER (OUTPATIENT)
Age: 42
Discharge: HOME | End: 2022-07-28
Attending: EMERGENCY MEDICINE
Payer: COMMERCIAL

## 2022-07-28 VITALS
DIASTOLIC BLOOD PRESSURE: 72 MMHG | OXYGEN SATURATION: 98 % | TEMPERATURE: 98 F | HEIGHT: 63 IN | RESPIRATION RATE: 19 BRPM | SYSTOLIC BLOOD PRESSURE: 108 MMHG | HEART RATE: 100 BPM | WEIGHT: 200 LBS | BODY MASS INDEX: 35.44 KG/M2

## 2022-07-28 DIAGNOSIS — H72.92 PERFORATION OF LEFT TYMPANIC MEMBRANE: ICD-10-CM

## 2022-07-28 DIAGNOSIS — H10.33 ACUTE BACTERIAL CONJUNCTIVITIS OF BOTH EYES: Primary | ICD-10-CM

## 2022-07-28 PROCEDURE — 99213 OFFICE O/P EST LOW 20 MIN: CPT

## 2022-07-28 RX ORDER — TETRACAINE HYDROCHLORIDE 5 MG/ML
1 SOLUTION OPHTHALMIC ONCE
Status: COMPLETED | OUTPATIENT
Start: 2022-07-28 | End: 2022-07-28

## 2022-07-28 RX ORDER — TOBRAMYCIN 3 MG/ML
2 SOLUTION/ DROPS OPHTHALMIC
Qty: 1 EACH | Refills: 0 | Status: SHIPPED | OUTPATIENT
Start: 2022-07-28 | End: 2022-08-02

## 2022-07-28 RX ORDER — HYDROCODONE BITARTRATE AND ACETAMINOPHEN 5; 325 MG/1; MG/1
1 TABLET ORAL EVERY 6 HOURS PRN
Qty: 10 TABLET | Refills: 0 | Status: SHIPPED | OUTPATIENT
Start: 2022-07-28

## 2022-07-28 NOTE — ED INITIAL ASSESSMENT (HPI)
Patient presents to IC for recheck of left ear as she perforated her eardrum ski diving 2-3 weeks ago. Secondly,she awoke today with bilateral eye pain L>R with gunky yellow-white discharge. No fever.

## 2023-02-07 ENCOUNTER — APPOINTMENT (OUTPATIENT)
Dept: GENERAL RADIOLOGY | Facility: HOSPITAL | Age: 43
End: 2023-02-07
Attending: EMERGENCY MEDICINE
Payer: COMMERCIAL

## 2023-02-07 ENCOUNTER — HOSPITAL ENCOUNTER (INPATIENT)
Facility: HOSPITAL | Age: 43
LOS: 5 days | Discharge: HOME | End: 2023-02-13
Attending: EMERGENCY MEDICINE | Admitting: HOSPITALIST
Payer: COMMERCIAL

## 2023-02-07 ENCOUNTER — HOSPITAL ENCOUNTER (INPATIENT)
Facility: HOSPITAL | Age: 43
LOS: 5 days | Discharge: NURSING, REHAB OR LONG-TERM CARE | End: 2023-02-13
Attending: EMERGENCY MEDICINE | Admitting: HOSPITALIST
Payer: COMMERCIAL

## 2023-02-07 DIAGNOSIS — F41.1 GENERALIZED ANXIETY DISORDER: ICD-10-CM

## 2023-02-07 DIAGNOSIS — F32.A DEPRESSION, UNSPECIFIED DEPRESSION TYPE: Primary | ICD-10-CM

## 2023-02-07 DIAGNOSIS — N30.00 ACUTE CYSTITIS WITHOUT HEMATURIA: ICD-10-CM

## 2023-02-07 DIAGNOSIS — R44.3 HALLUCINATIONS: ICD-10-CM

## 2023-02-07 DIAGNOSIS — F43.10 PTSD (POST-TRAUMATIC STRESS DISORDER): ICD-10-CM

## 2023-02-07 DIAGNOSIS — F31.60 BIPOLAR 1 DISORDER, MIXED (HCC): ICD-10-CM

## 2023-02-07 LAB
ALBUMIN SERPL-MCNC: 3.4 G/DL (ref 3.4–5)
ALBUMIN/GLOB SERPL: 1.1 {RATIO} (ref 1–2)
ALP LIVER SERPL-CCNC: 96 U/L
ALT SERPL-CCNC: 17 U/L
AMPHET UR QL SCN: NEGATIVE
ANION GAP SERPL CALC-SCNC: 3 MMOL/L (ref 0–18)
AST SERPL-CCNC: 11 U/L (ref 15–37)
B-HCG UR QL: NEGATIVE
BASOPHILS # BLD AUTO: 0.03 X10(3) UL (ref 0–0.2)
BASOPHILS NFR BLD AUTO: 0.5 %
BILIRUB SERPL-MCNC: 0.4 MG/DL (ref 0.1–2)
BILIRUB UR QL CFM: NEGATIVE
BUN BLD-MCNC: 7 MG/DL (ref 7–18)
CALCIUM BLD-MCNC: 8.9 MG/DL (ref 8.5–10.1)
CANNABINOIDS UR QL SCN: NEGATIVE
CHLORIDE SERPL-SCNC: 110 MMOL/L (ref 98–112)
CO2 SERPL-SCNC: 27 MMOL/L (ref 21–32)
COCAINE UR QL: NEGATIVE
COLOR UR AUTO: YELLOW
CREAT BLD-MCNC: 0.94 MG/DL
CREAT UR-SCNC: 750 MG/DL
EOSINOPHIL # BLD AUTO: 0.15 X10(3) UL (ref 0–0.7)
EOSINOPHIL NFR BLD AUTO: 2.3 %
ERYTHROCYTE [DISTWIDTH] IN BLOOD BY AUTOMATED COUNT: 14.5 %
ETHANOL SERPL-MCNC: <3 MG/DL (ref ?–3)
GFR SERPLBLD BASED ON 1.73 SQ M-ARVRAT: 77 ML/MIN/1.73M2 (ref 60–?)
GLOBULIN PLAS-MCNC: 3.1 G/DL (ref 2.8–4.4)
GLUCOSE BLD-MCNC: 93 MG/DL (ref 70–99)
GLUCOSE UR STRIP.AUTO-MCNC: NEGATIVE MG/DL
HCT VFR BLD AUTO: 39.5 %
HGB BLD-MCNC: 12.6 G/DL
IMM GRANULOCYTES # BLD AUTO: 0.01 X10(3) UL (ref 0–1)
IMM GRANULOCYTES NFR BLD: 0.2 %
LEUKOCYTE ESTERASE UR QL STRIP.AUTO: NEGATIVE
LITHIUM SERPL-SCNC: 0.3 MMOL/L (ref 0.6–1.2)
LYMPHOCYTES # BLD AUTO: 3.04 X10(3) UL (ref 1–4)
LYMPHOCYTES NFR BLD AUTO: 47.4 %
MCH RBC QN AUTO: 30.6 PG (ref 26–34)
MCHC RBC AUTO-ENTMCNC: 31.9 G/DL (ref 31–37)
MCV RBC AUTO: 95.9 FL
MONOCYTES # BLD AUTO: 0.39 X10(3) UL (ref 0.1–1)
MONOCYTES NFR BLD AUTO: 6.1 %
NEUTROPHILS # BLD AUTO: 2.79 X10 (3) UL (ref 1.5–7.7)
NEUTROPHILS # BLD AUTO: 2.79 X10(3) UL (ref 1.5–7.7)
NEUTROPHILS NFR BLD AUTO: 43.5 %
NITRITE UR QL STRIP.AUTO: NEGATIVE
OPIATES UR QL SCN: NEGATIVE
OSMOLALITY SERPL CALC.SUM OF ELEC: 288 MOSM/KG (ref 275–295)
OXYCODONE UR QL SCN: NEGATIVE
PH UR STRIP.AUTO: 6 [PH] (ref 5–8)
PLATELET # BLD AUTO: 195 10(3)UL (ref 150–450)
POTASSIUM SERPL-SCNC: 3.4 MMOL/L (ref 3.5–5.1)
PROT SERPL-MCNC: 6.5 G/DL (ref 6.4–8.2)
RBC # BLD AUTO: 4.12 X10(6)UL
RBC UR QL AUTO: NEGATIVE
SARS-COV-2 RNA RESP QL NAA+PROBE: NOT DETECTED
SODIUM SERPL-SCNC: 140 MMOL/L (ref 136–145)
SP GR UR STRIP.AUTO: >=1.03 (ref 1–1.03)
UROBILINOGEN UR STRIP.AUTO-MCNC: 1 MG/DL
VALPROATE SERPL-MCNC: <3 UG/ML (ref 50–100)
WBC # BLD AUTO: 6.4 X10(3) UL (ref 4–11)
WBC #/AREA URNS AUTO: >50 /HPF
WBC CLUMPS UR QL AUTO: PRESENT /HPF
YEAST UR QL: PRESENT /HPF

## 2023-02-07 PROCEDURE — 73560 X-RAY EXAM OF KNEE 1 OR 2: CPT | Performed by: EMERGENCY MEDICINE

## 2023-02-07 RX ORDER — SODIUM CHLORIDE 9 MG/ML
125 INJECTION, SOLUTION INTRAVENOUS CONTINUOUS
Status: DISCONTINUED | OUTPATIENT
Start: 2023-02-07 | End: 2023-02-08

## 2023-02-07 RX ORDER — POTASSIUM CHLORIDE 20 MEQ/1
20 TABLET, EXTENDED RELEASE ORAL ONCE
Status: COMPLETED | OUTPATIENT
Start: 2023-02-07 | End: 2023-02-07

## 2023-02-07 RX ORDER — KETOROLAC TROMETHAMINE 15 MG/ML
15 INJECTION, SOLUTION INTRAMUSCULAR; INTRAVENOUS ONCE
Status: COMPLETED | OUTPATIENT
Start: 2023-02-07 | End: 2023-02-07

## 2023-02-08 PROBLEM — R44.3 HALLUCINATIONS: Status: ACTIVE | Noted: 2023-02-08

## 2023-02-08 PROBLEM — F32.A DEPRESSION, UNSPECIFIED DEPRESSION TYPE: Status: ACTIVE | Noted: 2023-02-08

## 2023-02-08 PROBLEM — N30.00 ACUTE CYSTITIS WITHOUT HEMATURIA: Status: ACTIVE | Noted: 2023-02-08

## 2023-02-08 LAB
ATRIAL RATE: 70 BPM
BILIRUB UR QL STRIP.AUTO: NEGATIVE
COLOR UR AUTO: YELLOW
GLUCOSE UR STRIP.AUTO-MCNC: NEGATIVE MG/DL
KETONES UR STRIP.AUTO-MCNC: NEGATIVE MG/DL
LACTATE SERPL-SCNC: 1 MMOL/L (ref 0.4–2)
NITRITE UR QL STRIP.AUTO: NEGATIVE
P AXIS: 19 DEGREES
P-R INTERVAL: 176 MS
PH UR STRIP.AUTO: 6 [PH] (ref 5–8)
POTASSIUM SERPL-SCNC: 4.1 MMOL/L (ref 3.5–5.1)
PROT UR STRIP.AUTO-MCNC: NEGATIVE MG/DL
Q-T INTERVAL: 422 MS
QRS DURATION: 80 MS
QTC CALCULATION (BEZET): 455 MS
R AXIS: 4 DEGREES
RBC UR QL AUTO: NEGATIVE
SP GR UR STRIP.AUTO: 1.01 (ref 1–1.03)
T AXIS: 28 DEGREES
UROBILINOGEN UR STRIP.AUTO-MCNC: <2 MG/DL
VENTRICULAR RATE: 70 BPM

## 2023-02-08 PROCEDURE — 99223 1ST HOSP IP/OBS HIGH 75: CPT | Performed by: INTERNAL MEDICINE

## 2023-02-08 PROCEDURE — 90792 PSYCH DIAG EVAL W/MED SRVCS: CPT | Performed by: OTHER

## 2023-02-08 RX ORDER — ECHINACEA PURPUREA EXTRACT 125 MG
1 TABLET ORAL
Status: DISCONTINUED | OUTPATIENT
Start: 2023-02-08 | End: 2023-02-13

## 2023-02-08 RX ORDER — ONDANSETRON 2 MG/ML
4 INJECTION INTRAMUSCULAR; INTRAVENOUS EVERY 6 HOURS PRN
Status: DISCONTINUED | OUTPATIENT
Start: 2023-02-08 | End: 2023-02-13

## 2023-02-08 RX ORDER — PROCHLORPERAZINE EDISYLATE 5 MG/ML
5 INJECTION INTRAMUSCULAR; INTRAVENOUS EVERY 8 HOURS PRN
Status: DISCONTINUED | OUTPATIENT
Start: 2023-02-08 | End: 2023-02-13

## 2023-02-08 RX ORDER — BENZONATATE 100 MG/1
200 CAPSULE ORAL 3 TIMES DAILY PRN
Status: DISCONTINUED | OUTPATIENT
Start: 2023-02-08 | End: 2023-02-13

## 2023-02-08 RX ORDER — LORAZEPAM 1 MG/1
1 TABLET ORAL EVERY 6 HOURS PRN
Status: DISCONTINUED | OUTPATIENT
Start: 2023-02-08 | End: 2023-02-13

## 2023-02-08 RX ORDER — BISACODYL 10 MG
10 SUPPOSITORY, RECTAL RECTAL
Status: DISCONTINUED | OUTPATIENT
Start: 2023-02-08 | End: 2023-02-13

## 2023-02-08 RX ORDER — SODIUM CHLORIDE 9 MG/ML
INJECTION, SOLUTION INTRAVENOUS CONTINUOUS
Status: DISCONTINUED | OUTPATIENT
Start: 2023-02-08 | End: 2023-02-09

## 2023-02-08 RX ORDER — ACETAMINOPHEN 500 MG
1000 TABLET ORAL ONCE
Status: COMPLETED | OUTPATIENT
Start: 2023-02-08 | End: 2023-02-08

## 2023-02-08 RX ORDER — ALPRAZOLAM 0.25 MG/1
0.25 TABLET ORAL ONCE
Status: COMPLETED | OUTPATIENT
Start: 2023-02-08 | End: 2023-02-08

## 2023-02-08 RX ORDER — ALBUTEROL SULFATE 90 UG/1
2 AEROSOL, METERED RESPIRATORY (INHALATION) EVERY 4 HOURS PRN
Status: DISCONTINUED | OUTPATIENT
Start: 2023-02-08 | End: 2023-02-13

## 2023-02-08 RX ORDER — POLYETHYLENE GLYCOL 3350 17 G/17G
17 POWDER, FOR SOLUTION ORAL DAILY PRN
Status: DISCONTINUED | OUTPATIENT
Start: 2023-02-08 | End: 2023-02-13

## 2023-02-08 RX ORDER — FLUTICASONE FUROATE AND VILANTEROL 100; 25 UG/1; UG/1
1 POWDER RESPIRATORY (INHALATION) DAILY
Status: DISCONTINUED | OUTPATIENT
Start: 2023-02-08 | End: 2023-02-13

## 2023-02-08 RX ORDER — ENOXAPARIN SODIUM 100 MG/ML
40 INJECTION SUBCUTANEOUS DAILY
Status: DISCONTINUED | OUTPATIENT
Start: 2023-02-08 | End: 2023-02-13

## 2023-02-08 RX ORDER — SODIUM PHOSPHATE, DIBASIC AND SODIUM PHOSPHATE, MONOBASIC 7; 19 G/133ML; G/133ML
1 ENEMA RECTAL ONCE AS NEEDED
Status: DISCONTINUED | OUTPATIENT
Start: 2023-02-08 | End: 2023-02-13

## 2023-02-08 RX ORDER — MELATONIN
3 NIGHTLY PRN
Status: DISCONTINUED | OUTPATIENT
Start: 2023-02-08 | End: 2023-02-13

## 2023-02-08 RX ORDER — SENNOSIDES 8.6 MG
17.2 TABLET ORAL NIGHTLY PRN
Status: DISCONTINUED | OUTPATIENT
Start: 2023-02-08 | End: 2023-02-13

## 2023-02-08 RX ORDER — QUETIAPINE FUMARATE 100 MG/1
300 TABLET, FILM COATED ORAL NIGHTLY
Status: DISCONTINUED | OUTPATIENT
Start: 2023-02-08 | End: 2023-02-13

## 2023-02-08 RX ORDER — KETOROLAC TROMETHAMINE 15 MG/ML
15 INJECTION, SOLUTION INTRAMUSCULAR; INTRAVENOUS EVERY 6 HOURS PRN
Status: DISPENSED | OUTPATIENT
Start: 2023-02-08 | End: 2023-02-10

## 2023-02-08 RX ORDER — LITHIUM CARBONATE 600 MG/1
600 CAPSULE ORAL NIGHTLY
Status: DISCONTINUED | OUTPATIENT
Start: 2023-02-08 | End: 2023-02-13

## 2023-02-08 RX ORDER — ACETAMINOPHEN 500 MG
500 TABLET ORAL EVERY 4 HOURS PRN
Status: DISCONTINUED | OUTPATIENT
Start: 2023-02-08 | End: 2023-02-13

## 2023-02-08 NOTE — ED NOTES
Writer met with pt, introducing self and role. Pt presents as calm and cooperative, resting on cart. Sitter present. Writer explained and offered available unit resources, including DBT workbook handouts. Pt expressed familiarity with [facility] resources but declined at this time. Advised to notify staff if in need. Will check back with pt.

## 2023-02-08 NOTE — PLAN OF CARE
Assumed pt from ED at ~1430. A&Ox4. VSS. Room air. NSR on tele. 1:1 care companion at bedside for suicide precautions. Pt reports continued visual hallucinations. Seizure precautions maintained, no seizure-like activity noted. Denies N/V. PRN toradol given for 10/10 pain to knees. Regular diet. R AC PIV infusing 0.9NS @ 83 mL/hr. Lovenox subcutaneous for VTE prevention. Pt updated with POC.      Problem: Patient/Family Goals  Goal: Patient/Family Long Term Goal  Description: Patient's Long Term Goal: discharge  Interventions:  - iv fluids   - iv abx   - psych consult  - See additional Care Plan goals for specific interventions  Outcome: Progressing  Goal: Patient/Family Short Term Goal  Description: Patient's Short Term Goal: pain control  Interventions:   - prn toradol  - See additional Care Plan goals for specific interventions  Outcome: Progressing

## 2023-02-08 NOTE — ED QUICK NOTES
Orders for admission, patient is aware of plan and ready to go upstairs.  Any questions, please call ED RN Stephon Kohler at extension [phone]     Patient Covid vaccination status: Unvaccinated     COVID Test Ordered in ED: Rapid SARS-CoV-2 by PCR    COVID Suspicion at Admission: Low clinical suspicion for COVID    Running Infusions:      Mental Status/LOC at time of transport: A&Ox4    Other pertinent information: ambulatory, visual and auditory hallucinations, calm and cooperative, sitter at bedside  CIWA score: N/A   NIH score:  N/A

## 2023-02-08 NOTE — ED QUICK NOTES
Spoke to Malin from [facility], per admitting MD: transfer to [facility] is on HOLD due to pt hypotension, [facility] MD requesting cause due to pt hx of frequent falls. She is a fall high risk. ED MD notified.

## 2023-02-08 NOTE — ED PROVIDER NOTES
The patient was observed in the department and remained calm and cooperative during their stay. We are awaiting psychiatric placement at this time. EKG    Rate, intervals and axes as noted on EKG Report. Rate: 70  Rhythm: Sinus Rhythm  Reading: No change from EKG compared on June 6, 2021. While here the patient was given IV fluid with improvement in her blood pressure. She was given IV Rocephin and had blood cultures and lactic acid levels done as well. At this time the patient will be placed in [facility] for treatment for her urinary tract infection as well as her depression with suicidal ideation.

## 2023-02-08 NOTE — ED NOTES
Pt was assessed and recommended inpatient admission. [facility] will review for bed availability. Pt was updated on the POC and was in agreement. This writer attempted to discuss the paperwork with pt but she appeared drowsy and asked to do it later. ANDREW will attempt to discuss the paperwork with her again at a later time.

## 2023-02-08 NOTE — PLAN OF CARE
NURSING ADMISSION NOTE    Patient admitted via Cart  Oriented to room. Safety precautions initiated. Bed in low position. Call light in reach. Admission database completed by this RN. 1:1 sitter at bedside. Pt unsure of medications being taken at home, declined for this RN to contact any family members regarding medication information as they would not know. Hospitalist at bedside, psychiatry to see. Social work consult placed d/t answers to assessments on admission navigator.

## 2023-02-08 NOTE — PROGRESS NOTES
02/08/23 0048   COVID Exposure Risk Screening   Do you have any of the following new or worsening symptoms of COVID-19? None   Have you been diagnosed with COVID-19 within the past 10 days? No   Are you awaiting COVID-19 test results or do you have a COVID-19 test scheduled? No   In the past 10 days, have you been in contact with someone who was confirmed or suspected to have COVID-19?  No  Devan Tyler states she had the Covid vaccine, no boosters)

## 2023-02-08 NOTE — ED QUICK NOTES
Pt removed IV. Reports she was \"pulliing it out as she woke up\". Pt cleaned up and walked to the restroo w pct.

## 2023-02-08 NOTE — ED INITIAL ASSESSMENT (HPI)
PT REPORTS FALLING IN GARAGE THIS AFTERNOON, INJURY TO BUTTOCK WITH A TOOL STICKING OUT, FELL TO KNEES, ABRASION RO LEFT KNEE. INCREASING AUDITORY AND VISUAL HALLUCINATIONS. HAS TRIED DIFFERENT MEDICATIONS FOR SAME. LAST CHANGE IN MEDS APPROX 12 WKS AGO. DIFFICULTY WITH EATING AND SLEEPING. PT WITH THOUGHTS OF HURTING HERSELF.

## 2023-02-08 NOTE — ED QUICK NOTES
Antoine Chappell calling with questions regarding patient ambulation, patient states she uses cane and walker, will call back with update on bed

## 2023-02-09 LAB
ANION GAP SERPL CALC-SCNC: 1 MMOL/L (ref 0–18)
BASOPHILS # BLD AUTO: 0.03 X10(3) UL (ref 0–0.2)
BASOPHILS NFR BLD AUTO: 0.6 %
BUN BLD-MCNC: 8 MG/DL (ref 7–18)
CALCIUM BLD-MCNC: 8.6 MG/DL (ref 8.5–10.1)
CHLORIDE SERPL-SCNC: 117 MMOL/L (ref 98–112)
CO2 SERPL-SCNC: 23 MMOL/L (ref 21–32)
CREAT BLD-MCNC: 0.65 MG/DL
EOSINOPHIL # BLD AUTO: 0.2 X10(3) UL (ref 0–0.7)
EOSINOPHIL NFR BLD AUTO: 4.2 %
ERYTHROCYTE [DISTWIDTH] IN BLOOD BY AUTOMATED COUNT: 14.3 %
GFR SERPLBLD BASED ON 1.73 SQ M-ARVRAT: 111 ML/MIN/1.73M2 (ref 60–?)
GLUCOSE BLD-MCNC: 83 MG/DL (ref 70–99)
HCT VFR BLD AUTO: 36.1 %
HGB BLD-MCNC: 11.4 G/DL
IMM GRANULOCYTES # BLD AUTO: 0 X10(3) UL (ref 0–1)
IMM GRANULOCYTES NFR BLD: 0 %
LYMPHOCYTES # BLD AUTO: 2.09 X10(3) UL (ref 1–4)
LYMPHOCYTES NFR BLD AUTO: 44.1 %
MCH RBC QN AUTO: 31.2 PG (ref 26–34)
MCHC RBC AUTO-ENTMCNC: 31.6 G/DL (ref 31–37)
MCV RBC AUTO: 98.9 FL
MONOCYTES # BLD AUTO: 0.34 X10(3) UL (ref 0.1–1)
MONOCYTES NFR BLD AUTO: 7.2 %
NEUTROPHILS # BLD AUTO: 2.08 X10 (3) UL (ref 1.5–7.7)
NEUTROPHILS # BLD AUTO: 2.08 X10(3) UL (ref 1.5–7.7)
NEUTROPHILS NFR BLD AUTO: 43.9 %
OSMOLALITY SERPL CALC.SUM OF ELEC: 289 MOSM/KG (ref 275–295)
PLATELET # BLD AUTO: 175 10(3)UL (ref 150–450)
POTASSIUM SERPL-SCNC: 4.3 MMOL/L (ref 3.5–5.1)
RBC # BLD AUTO: 3.65 X10(6)UL
SODIUM SERPL-SCNC: 141 MMOL/L (ref 136–145)
WBC # BLD AUTO: 4.7 X10(3) UL (ref 4–11)

## 2023-02-09 PROCEDURE — 99232 SBSQ HOSP IP/OBS MODERATE 35: CPT | Performed by: INTERNAL MEDICINE

## 2023-02-09 PROCEDURE — 99232 SBSQ HOSP IP/OBS MODERATE 35: CPT | Performed by: OTHER

## 2023-02-09 NOTE — CM/SW NOTE
MSW discussed order with Psych MD. MSW will hold off because pt is not clear at this time and have [address]. at [facility] [place] to [place]

## 2023-02-10 LAB
ATRIAL RATE: 64 BPM
P AXIS: 31 DEGREES
P-R INTERVAL: 190 MS
Q-T INTERVAL: 408 MS
QRS DURATION: 88 MS
QTC CALCULATION (BEZET): 420 MS
R AXIS: 52 DEGREES
T AXIS: 30 DEGREES
VENTRICULAR RATE: 64 BPM

## 2023-02-10 PROCEDURE — 99232 SBSQ HOSP IP/OBS MODERATE 35: CPT | Performed by: OTHER

## 2023-02-10 PROCEDURE — 99232 SBSQ HOSP IP/OBS MODERATE 35: CPT | Performed by: INTERNAL MEDICINE

## 2023-02-10 RX ORDER — HYDROXYZINE HYDROCHLORIDE 25 MG/1
50 TABLET, FILM COATED ORAL 3 TIMES DAILY PRN
Status: DISCONTINUED | OUTPATIENT
Start: 2023-02-10 | End: 2023-02-13

## 2023-02-10 RX ORDER — HYDROXYZINE HYDROCHLORIDE 25 MG/1
25 TABLET, FILM COATED ORAL 3 TIMES DAILY PRN
Status: DISCONTINUED | OUTPATIENT
Start: 2023-02-10 | End: 2023-02-10

## 2023-02-10 RX ORDER — TRAZODONE HYDROCHLORIDE 100 MG/1
100 TABLET ORAL NIGHTLY PRN
Status: DISCONTINUED | OUTPATIENT
Start: 2023-02-10 | End: 2023-02-13

## 2023-02-10 RX ORDER — HYDROXYZINE HYDROCHLORIDE 25 MG/1
25 TABLET, FILM COATED ORAL 3 TIMES DAILY PRN
Status: DISCONTINUED | OUTPATIENT
Start: 2023-02-10 | End: 2023-02-13

## 2023-02-10 NOTE — PLAN OF CARE
Assumed pt care at 0730. A&Ox4. VSS. Room air. NSR on tele. Pt denies hallucinations and SI currently. 1:1 sitter at bedside for SI. Seizure precautions maintained, no seizure-like activity noted. PRN toradol given for left knee pain. Denies N/V. Regular diet. Voiding, SBA to bathroom. R hand PIV SL. Lovenox subcutaneous for VTE prevention. Pt updated with POC.      Problem: Patient/Family Goals  Goal: Patient/Family Long Term Goal  Description: Patient's Long Term Goal: discharge  Interventions:  - iv fluids   - iv abx   - psych consult  - See additional Care Plan goals for specific interventions  Outcome: Progressing  Goal: Patient/Family Short Term Goal  Description: Patient's Short Term Goal: pain control  Interventions:   - prn toradol  - See additional Care Plan goals for specific interventions  Outcome: Progressing

## 2023-02-10 NOTE — PLAN OF CARE
Assumed pt care at 0730. Pt on SI precautions with 1 to 1 sitter. Tele NSR. [facility] today but blood culture came back positive. We are retesting blood culture because hospitalist thinks it may be a contaminated sample. Continue plan of care.      Problem: Patient/Family Goals  Goal: Patient/Family Long Term Goal  Description: Patient's Long Term Goal: discharge    Interventions:  - iv fluids   - iv abx   - psych consult  - See additional Care Plan goals for specific interventions  Outcome: Progressing  Goal: Patient/Family Short Term Goal  Description: Patient's Short Term Goal: pain control    Interventions:   - prn toradol  - See additional Care Plan goals for specific interventions  Outcome: Progressing

## 2023-02-10 NOTE — CERTIFICATION
Ref: [address] 5/3-403, 5/3-602, 5/3-607, 5/3-610    5/3-702, 5/3-813, 5/4-306, 5/4-402, 5/4-403    5/4-405, 5/4-501, 5/4-611, 0/8-875   Inpatient Certificate  Re: Kirit Marte    (name)     I personally informed the above-named individual of the purpose of this examination and that he or she did not have to speak to me, and that any statements made might be related in court as to the individual's clinical condition or need for services. Additionally, if this examination was for the purpose of determining that the above-named individual is developmentally disabled and dangerous, I informed the individual of his or her right to speak with a relative, friend or  before the examination, and of his or her right to have an  appointed for him or her if he or she so desired. Electronically signed by Shelbie Newberry MD    Signature of Examiner     On             February 10th , 2023 , at     3:05  [] a.m.  [x] p.m.,  I personally examined the    (date)  (year)  (time)    above-named individual. The examination was conducted at [facility].  Based on the foregoing examination, it is my opinion that he or she is:  [x]  A person with mental illness who, because of his or her illness is reasonably expected, unless treated on an inpatient basis, to engage in conduct placing such person or another in physical harm or in reasonable expectation of being physically harmed;   [x]  A person with mental illness who, because of his or her illness is unable to provide for his or her basic physical needs so as to guard himself or herself from serious harm, without the assistance of family or others, unless treated on an inpatient basis;   []  A person with mental illness who: refuses treatment or is not adhering adequately to prescribed treatment; because of the nature of his or her illness is unable to understand his or her need for treatment; and if not treated on an inpatient basis, is reasonably expected based on his or her behavioral history, to suffer mental or emotional deterioration and is reasonably expected, after such deterioration, to meet the criteria of either paragraph one or paragraph two above;   []  An individual who is developmentally disabled and unless treated on an in-patient basis is reasonably expected to inflict serious physical harm upon himself or herself or others in the near future, and/or   [x]  Is in need of immediate hospitalization for the prevention of such harm. I base my opinion on the following (including clinical observations, factual information):  I examined the patient in person. 40 yo WF with hx of schizoaffective disorder, bipolar type, presents with psychosis and severe depressive episode. Admitted to [facility] on 2/7/23 for dehydration. Resolved with IVF's. She has suicidal ideation and a plan to cut her wrists and bleed out. She also hears voices and sees people. No agitation. Triggers for suicidal ideation: Her 21 yr old son recently overdosed as a suicide attempt and then had a seizure. Her 16yr old son is also 'in crisis\". Strained relationship with her .     I believe that the individual is subject to: []  Involuntary inpatient admission and is not in need of immediate hospitalization   (check one) [x]  Involuntary inpatient admission and is in need of immediate hospitalization    []  Judicial inpatient admission and is not in need of immediate hospitalization    []  Judicial inpatient admission and is in need of immediate hospitalization     Date: 2/10/2023 Signature: Electronically signed by Wesley Felix MD   Title: MD Printed Name: Rafael Guerra [phone] ([id number]) Inpatient Certificate    Printed by [email or web address]

## 2023-02-10 NOTE — BH PROGRESS NOTE
Called [address] at [facility] to find out if a bed would be available for the patient today. There are no current beds at [facility]. Transfer p+c completed in case no bed becomes available and the patient then would have to transfer to another hospital.    RN aware that [facility] will contact them if a bed opens up. The p+c uploaded to this record. The originals are on the chart.

## 2023-02-11 PROCEDURE — 99232 SBSQ HOSP IP/OBS MODERATE 35: CPT | Performed by: INTERNAL MEDICINE

## 2023-02-11 NOTE — PLAN OF CARE
Assumed care 0730. Alert and oriented x4. Reports hallucination this morning. 1:1 sitter for SI. SZ precautions in place. PRN ativan given for anxiety  Regular diet  Cpap at night. RA during the day. Lovenox for VTE prophylaxis  Reported 9/10 pain. PRn tylenol given. Up at vladislav  Continue to monitor pt.

## 2023-02-11 NOTE — BH PROGRESS NOTE
Met with Marce King to discuss plan of care. Informed Marce King that there are no beds available at [facility] this weekend. Asked Marce King if she preferred placement at another hospital or if she wanted to wait until Monday for [facility] to review. Marce King stated that she would prefer to wait until Monday for [facility]. Informed ANDREW Emerson's placement preference.

## 2023-02-11 NOTE — PROGRESS NOTES
Assumed care at [address]. Lethargic, Wake's up easily. Denies any pain or discomfort. On room air. Lung sounds clear. CPAP at night. Up to the bathroom with 1-person SBA. Steady gait. Regular diet. 1;1 sitter in place S/I. Safety precautions maintained.    Plan to discharge to [facility] pending bed availability.   '

## 2023-02-12 PROCEDURE — 99232 SBSQ HOSP IP/OBS MODERATE 35: CPT | Performed by: INTERNAL MEDICINE

## 2023-02-13 VITALS
WEIGHT: 190 LBS | DIASTOLIC BLOOD PRESSURE: 72 MMHG | TEMPERATURE: 98 F | SYSTOLIC BLOOD PRESSURE: 109 MMHG | RESPIRATION RATE: 18 BRPM | OXYGEN SATURATION: 97 % | HEART RATE: 85 BPM | BODY MASS INDEX: 34 KG/M2

## 2023-02-13 PROCEDURE — 99232 SBSQ HOSP IP/OBS MODERATE 35: CPT | Performed by: OTHER

## 2023-02-13 PROCEDURE — 99239 HOSP IP/OBS DSCHRG MGMT >30: CPT | Performed by: INTERNAL MEDICINE

## 2023-02-13 RX ORDER — LORAZEPAM 1 MG/1
1 TABLET ORAL EVERY 6 HOURS PRN
Qty: 120 TABLET | Refills: 0 | Status: SHIPPED | COMMUNITY
Start: 2023-02-13

## 2023-02-13 RX ORDER — TRAZODONE HYDROCHLORIDE 100 MG/1
100 TABLET ORAL NIGHTLY PRN
Qty: 60 TABLET | Refills: 2 | Status: SHIPPED | COMMUNITY
Start: 2023-02-13

## 2023-02-13 NOTE — DISCHARGE INSTRUCTIONS
Dr. Sada Andersen is recommending you keep your appointment with your own psychiatrist, Dr. Libby Sampson [phone]. You stated the appointment is in March but unsure of the date.       [facility] LIne= [phone]

## 2023-02-13 NOTE — BH PROGRESS NOTE
Went to see the patient to ask her who her psychiatrist is and the next appointment to place in the discharge summary. Patient stated his name is Dr. Raysa Richardson with [facility] and unsure the exact date of her appointment. She said, she thinks it is in March. Dr. Walker Black is aware.

## 2023-02-13 NOTE — PLAN OF CARE
A/O x 4.  Sitter, appropriate behavior  Room air day, CPAP At noc  Tylenol PO PRN pain  Reg diet  Lidocaine patch to back  Up ad vladislav  DC to [facility] when there is a bed  All needs met will cont to monitor    Problem: Patient/Family Goals  Goal: Patient/Family Long Term Goal  Description: Patient's Long Term Goal: discharge    Interventions:  - iv fluids   - iv abx   - psych consult  - See additional Care Plan goals for specific interventions  Outcome: Progressing  Goal: Patient/Family Short Term Goal  Description: Patient's Short Term Goal: pain control    Interventions:   - prn toradol  - See additional Care Plan goals for specific interventions  Outcome: Progressing

## 2023-02-13 NOTE — PLAN OF CARE
Assumed care of pt @ 0730. Pt is A/Ox 4. Anxious at times. On RA, VSS  IV saline locked  Tolerating diet. 1:1 sitter maintained  Pt states pain is 7/10 to mid and upper back  Up as tolerated   Intake/outputs WNL. Pt took a bath today    Plan: awaiting for a bed at [facility]    Updated POC with patient and family. Will continue to monitor.         Problem: Patient/Family Goals  Goal: Patient/Family Long Term Goal  Description: Patient's Long Term Goal: discharge    Interventions:  - iv fluids   - iv abx   - psych consult  - See additional Care Plan goals for specific interventions  Outcome: Progressing  Goal: Patient/Family Short Term Goal  Description: Patient's Short Term Goal: pain control    Interventions:   - prn toradol  - See additional Care Plan goals for specific interventions  Outcome: Progressing

## 2023-02-13 NOTE — PLAN OF CARE
AVS completed, all questions answered. Son to pick patient up. Nurse will transfer patient downstairs in wheelchair.

## 2023-02-14 NOTE — PAYOR COMM NOTE
Discharge Notification    Patient Name: Steven Blackburn  Payor: [employer] [address] [place] [address]  Subscriber #: [id number]  Authorization Number: [id number]  Admit Date/Time: 2/7/2023 7:53 PM  Discharge Date/Time: 2/13/2023 6:11 PM

## 2023-04-11 ENCOUNTER — EXTERNAL LAB (OUTPATIENT)
Dept: OTHER | Age: 43
End: 2023-04-11

## 2023-04-11 ENCOUNTER — IMAGING SERVICES (OUTPATIENT)
Dept: OTHER | Age: 43
End: 2023-04-11
Attending: NURSE PRACTITIONER

## 2023-04-11 LAB — LAB RESULT: NORMAL

## 2023-04-12 ENCOUNTER — HOSPITAL ENCOUNTER (INPATIENT)
Age: 43
LOS: 4 days | Discharge: HOME | DRG: 091 | End: 2023-04-16
Attending: SURGERY | Admitting: SURGERY

## 2023-04-12 ENCOUNTER — APPOINTMENT (OUTPATIENT)
Dept: CT IMAGING | Age: 43
DRG: 091 | End: 2023-04-12
Attending: NURSE PRACTITIONER

## 2023-04-12 PROBLEM — S06.5XAA SDH (SUBDURAL HEMATOMA) (CMD): Status: ACTIVE | Noted: 2023-04-12

## 2023-04-12 LAB
ANION GAP SERPL CALC-SCNC: 8 MMOL/L (ref 7–19)
BUN SERPL-MCNC: 3 MG/DL (ref 6–20)
BUN/CREAT SERPL: 7 (ref 7–25)
CALCIUM SERPL-MCNC: 8.6 MG/DL (ref 8.4–10.2)
CHLORIDE SERPL-SCNC: 114 MMOL/L (ref 97–110)
CO2 SERPL-SCNC: 22 MMOL/L (ref 21–32)
CREAT SERPL-MCNC: 0.44 MG/DL (ref 0.51–0.95)
DEPRECATED RDW RBC: 43.2 FL (ref 39–50)
ERYTHROCYTE [DISTWIDTH] IN BLOOD: 12.6 % (ref 11–15)
FASTING DURATION TIME PATIENT: ABNORMAL H
GFR SERPLBLD BASED ON 1.73 SQ M-ARVRAT: >90 ML/MIN
GLUCOSE BLDC GLUCOMTR-MCNC: 120 MG/DL (ref 70–99)
GLUCOSE BLDC GLUCOMTR-MCNC: 64 MG/DL (ref 70–99)
GLUCOSE BLDC GLUCOMTR-MCNC: 73 MG/DL (ref 70–99)
GLUCOSE BLDC GLUCOMTR-MCNC: 73 MG/DL (ref 70–99)
GLUCOSE BLDC GLUCOMTR-MCNC: 74 MG/DL (ref 70–99)
GLUCOSE BLDC GLUCOMTR-MCNC: 74 MG/DL (ref 70–99)
GLUCOSE BLDC GLUCOMTR-MCNC: 75 MG/DL (ref 70–99)
GLUCOSE BLDC GLUCOMTR-MCNC: 78 MG/DL (ref 70–99)
GLUCOSE SERPL-MCNC: 139 MG/DL (ref 70–99)
HCT VFR BLD CALC: 31.8 % (ref 36–46.5)
HGB BLD-MCNC: 10.3 G/DL (ref 12–15.5)
MAGNESIUM SERPL-MCNC: 2.1 MG/DL (ref 1.7–2.4)
MCH RBC QN AUTO: 30.9 PG (ref 26–34)
MCHC RBC AUTO-ENTMCNC: 32.4 G/DL (ref 32–36.5)
MCV RBC AUTO: 95.5 FL (ref 78–100)
NRBC BLD MANUAL-RTO: 0 /100 WBC
PHOSPHATE SERPL-MCNC: 2.4 MG/DL (ref 2.4–4.7)
PLATELET # BLD AUTO: 143 K/MCL (ref 140–450)
POTASSIUM SERPL-SCNC: 4.1 MMOL/L (ref 3.4–5.1)
RBC # BLD: 3.33 MIL/MCL (ref 4–5.2)
SODIUM SERPL-SCNC: 140 MMOL/L (ref 135–145)
WBC # BLD: 4.8 K/MCL (ref 4.2–11)

## 2023-04-12 PROCEDURE — 10002807 HB RX 258: Performed by: NURSE PRACTITIONER

## 2023-04-12 PROCEDURE — 10000002 HB ROOM CHARGE MED SURG

## 2023-04-12 PROCEDURE — 13003287

## 2023-04-12 PROCEDURE — 99233 SBSQ HOSP IP/OBS HIGH 50: CPT | Performed by: SURGERY

## 2023-04-12 PROCEDURE — 10004281 HB COUNTER-STAFF TIME PER 15 MIN

## 2023-04-12 PROCEDURE — 10002803 HB RX 637: Performed by: NURSE PRACTITIONER

## 2023-04-12 PROCEDURE — 70450 CT HEAD/BRAIN W/O DYE: CPT

## 2023-04-12 PROCEDURE — 10002801 HB RX 250 W/O HCPCS: Performed by: NURSE PRACTITIONER

## 2023-04-12 PROCEDURE — 99222 1ST HOSP IP/OBS MODERATE 55: CPT | Performed by: SURGERY

## 2023-04-12 PROCEDURE — 10002801 HB RX 250 W/O HCPCS

## 2023-04-12 PROCEDURE — 85027 COMPLETE CBC AUTOMATED: CPT | Performed by: NURSE PRACTITIONER

## 2023-04-12 PROCEDURE — S0310 HOSPITALIST VISIT: HCPCS | Performed by: INTERNAL MEDICINE

## 2023-04-12 PROCEDURE — 10002803 HB RX 637: Performed by: SURGERY

## 2023-04-12 PROCEDURE — 10002807 HB RX 258

## 2023-04-12 PROCEDURE — 84100 ASSAY OF PHOSPHORUS: CPT | Performed by: NURSE PRACTITIONER

## 2023-04-12 PROCEDURE — 83735 ASSAY OF MAGNESIUM: CPT | Performed by: NURSE PRACTITIONER

## 2023-04-12 PROCEDURE — 80048 BASIC METABOLIC PNL TOTAL CA: CPT | Performed by: NURSE PRACTITIONER

## 2023-04-12 PROCEDURE — 10002800 HB RX 250 W HCPCS: Performed by: SURGERY

## 2023-04-12 RX ORDER — BISACODYL 10 MG
10 SUPPOSITORY, RECTAL RECTAL DAILY PRN
Status: DISCONTINUED | OUTPATIENT
Start: 2023-04-12 | End: 2023-04-16 | Stop reason: HOSPADM

## 2023-04-12 RX ORDER — POLYETHYLENE GLYCOL 3350 17 G/17G
17 POWDER, FOR SOLUTION ORAL DAILY
Status: DISCONTINUED | OUTPATIENT
Start: 2023-04-12 | End: 2023-04-16 | Stop reason: HOSPADM

## 2023-04-12 RX ORDER — FLUTICASONE FUROATE AND VILANTEROL 100; 25 UG/1; UG/1
1 POWDER RESPIRATORY (INHALATION)
Status: DISCONTINUED | OUTPATIENT
Start: 2023-04-12 | End: 2023-04-16 | Stop reason: HOSPADM

## 2023-04-12 RX ORDER — ONDANSETRON 2 MG/ML
4 INJECTION INTRAMUSCULAR; INTRAVENOUS EVERY 6 HOURS PRN
Status: DISCONTINUED | OUTPATIENT
Start: 2023-04-12 | End: 2023-04-16 | Stop reason: HOSPADM

## 2023-04-12 RX ORDER — DEXMEDETOMIDINE HYDROCHLORIDE 4 UG/ML
0-1.5 INJECTION, SOLUTION INTRAVENOUS CONTINUOUS
Status: DISCONTINUED | OUTPATIENT
Start: 2023-04-12 | End: 2023-04-12

## 2023-04-12 RX ORDER — TRAZODONE HYDROCHLORIDE 100 MG/1
100 TABLET ORAL NIGHTLY
Status: DISCONTINUED | OUTPATIENT
Start: 2023-04-12 | End: 2023-04-12

## 2023-04-12 RX ORDER — DEXMEDETOMIDINE HYDROCHLORIDE 4 UG/ML
INJECTION, SOLUTION INTRAVENOUS
Status: COMPLETED
Start: 2023-04-12 | End: 2023-04-12

## 2023-04-12 RX ORDER — ENOXAPARIN SODIUM 100 MG/ML
30 INJECTION SUBCUTANEOUS EVERY 12 HOURS SCHEDULED
Status: DISCONTINUED | OUTPATIENT
Start: 2023-04-12 | End: 2023-04-16 | Stop reason: HOSPADM

## 2023-04-12 RX ORDER — TRAZODONE HYDROCHLORIDE 100 MG/1
100 TABLET ORAL NIGHTLY
Status: DISCONTINUED | OUTPATIENT
Start: 2023-04-12 | End: 2023-04-16 | Stop reason: HOSPADM

## 2023-04-12 RX ORDER — LEVETIRACETAM 500 MG/5ML
1500 INJECTION, SOLUTION, CONCENTRATE INTRAVENOUS ONCE
Status: COMPLETED | OUTPATIENT
Start: 2023-04-12 | End: 2023-04-12

## 2023-04-12 RX ORDER — BUTALBITAL, ACETAMINOPHEN AND CAFFEINE 50; 325; 40 MG/1; MG/1; MG/1
1 TABLET ORAL EVERY 4 HOURS PRN
Status: DISCONTINUED | OUTPATIENT
Start: 2023-04-12 | End: 2023-04-16 | Stop reason: HOSPADM

## 2023-04-12 RX ORDER — DEXMEDETOMIDINE HYDROCHLORIDE 4 UG/ML
0-1.5 INJECTION, SOLUTION INTRAVENOUS CONTINUOUS
Status: DISCONTINUED | OUTPATIENT
Start: 2023-04-12 | End: 2023-04-12 | Stop reason: CLARIF

## 2023-04-12 RX ORDER — DEXMEDETOMIDINE HYDROCHLORIDE 4 UG/ML
0-1 INJECTION, SOLUTION INTRAVENOUS CONTINUOUS
Status: DISCONTINUED | OUTPATIENT
Start: 2023-04-12 | End: 2023-04-12

## 2023-04-12 RX ORDER — QUETIAPINE FUMARATE 300 MG/1
300 TABLET, FILM COATED ORAL NIGHTLY
Status: DISCONTINUED | OUTPATIENT
Start: 2023-04-12 | End: 2023-04-12

## 2023-04-12 RX ORDER — LORAZEPAM 1 MG/1
1 TABLET ORAL EVERY 6 HOURS PRN
Status: DISCONTINUED | OUTPATIENT
Start: 2023-04-12 | End: 2023-04-16 | Stop reason: HOSPADM

## 2023-04-12 RX ORDER — LITHIUM CARBONATE 300 MG/1
600 TABLET, FILM COATED, EXTENDED RELEASE ORAL NIGHTLY
Status: DISCONTINUED | OUTPATIENT
Start: 2023-04-12 | End: 2023-04-16 | Stop reason: HOSPADM

## 2023-04-12 RX ORDER — SODIUM CHLORIDE 9 MG/ML
INJECTION, SOLUTION INTRAVENOUS CONTINUOUS
Status: DISCONTINUED | OUTPATIENT
Start: 2023-04-12 | End: 2023-04-12

## 2023-04-12 RX ORDER — QUETIAPINE FUMARATE 300 MG/1
300 TABLET, FILM COATED ORAL NIGHTLY
Status: DISCONTINUED | OUTPATIENT
Start: 2023-04-12 | End: 2023-04-16 | Stop reason: HOSPADM

## 2023-04-12 RX ORDER — ACETAMINOPHEN 325 MG/1
650 TABLET ORAL EVERY 4 HOURS PRN
Status: DISCONTINUED | OUTPATIENT
Start: 2023-04-12 | End: 2023-04-15

## 2023-04-12 RX ADMIN — ENOXAPARIN SODIUM 30 MG: 30 INJECTION SUBCUTANEOUS at 20:00

## 2023-04-12 RX ADMIN — LEVETIRACETAM 1500 MG: 100 INJECTION, SOLUTION INTRAVENOUS at 11:06

## 2023-04-12 RX ADMIN — ENOXAPARIN SODIUM 30 MG: 30 INJECTION SUBCUTANEOUS at 11:07

## 2023-04-12 RX ADMIN — TRAZODONE HYDROCHLORIDE 100 MG: 100 TABLET ORAL at 20:00

## 2023-04-12 RX ADMIN — DEXMEDETOMIDINE HYDROCHLORIDE 1 MCG/KG/HR: 4 INJECTION, SOLUTION INTRAVENOUS at 04:04

## 2023-04-12 RX ADMIN — LITHIUM CARBONATE 600 MG: 300 TABLET, EXTENDED RELEASE ORAL at 20:00

## 2023-04-12 RX ADMIN — SODIUM CHLORIDE: 9 INJECTION, SOLUTION INTRAVENOUS at 05:16

## 2023-04-12 RX ADMIN — LEVETIRACETAM 750 MG: 500 TABLET, FILM COATED ORAL at 20:00

## 2023-04-12 RX ADMIN — DIBASIC SODIUM PHOSPHATE, MONOBASIC POTASSIUM PHOSPHATE AND MONOBASIC SODIUM PHOSPHATE 250 MG: 852; 155; 130 TABLET ORAL at 14:12

## 2023-04-12 RX ADMIN — POLYETHYLENE GLYCOL 3350 17 G: 17 POWDER, FOR SOLUTION ORAL at 08:25

## 2023-04-12 RX ADMIN — LITHIUM CARBONATE 600 MG: 300 TABLET, EXTENDED RELEASE ORAL at 08:24

## 2023-04-12 RX ADMIN — DIBASIC SODIUM PHOSPHATE, MONOBASIC POTASSIUM PHOSPHATE AND MONOBASIC SODIUM PHOSPHATE 250 MG: 852; 155; 130 TABLET ORAL at 08:24

## 2023-04-12 RX ADMIN — LORAZEPAM 1 MG: 1 TABLET ORAL at 15:50

## 2023-04-12 RX ADMIN — LORAZEPAM 1 MG: 1 TABLET ORAL at 10:00

## 2023-04-12 RX ADMIN — FLUTICASONE FUROATE AND VILANTEROL TRIFENATATE 1 PUFF: 100; 25 POWDER RESPIRATORY (INHALATION) at 08:25

## 2023-04-12 RX ADMIN — SODIUM CHLORIDE 1 MCG/KG/HR: 9 INJECTION, SOLUTION INTRAVENOUS at 04:04

## 2023-04-12 RX ADMIN — QUETIAPINE FUMARATE 300 MG: 300 TABLET, FILM COATED ORAL at 20:00

## 2023-04-12 RX ADMIN — SODIUM CHLORIDE 0.1 MCG/KG/HR: 9 INJECTION, SOLUTION INTRAVENOUS at 08:24

## 2023-04-12 SDOH — HEALTH STABILITY: PHYSICAL HEALTH: DO YOU HAVE DIFFICULTY DRESSING OR BATHING?: NO

## 2023-04-12 SDOH — SOCIAL STABILITY: SOCIAL NETWORK: SUPPORT SYSTEMS: CHILDREN;SPOUSE

## 2023-04-12 SDOH — HEALTH STABILITY: GENERAL: BECAUSE OF A PHYSICAL, MENTAL, OR EMOTIONAL CONDITION, DO YOU HAVE DIFFICULTY DOING ERRANDS ALONE?: NO

## 2023-04-12 SDOH — SOCIAL STABILITY: SOCIAL NETWORK
HOW OFTEN DO YOU SEE OR TALK TO PEOPLE THAT YOU CARE ABOUT AND FEEL CLOSE TO? (FOR EXAMPLE: TALKING TO FRIENDS ON THE PHONE, VISITING FRIENDS OR FAMILY, GOING TO CHURCH OR CLUB MEETINGS): 3 TO 5 TIMES A WEEK

## 2023-04-12 SDOH — ECONOMIC STABILITY: TRANSPORTATION INSECURITY
IN THE PAST 12 MONTHS, HAS LACK OF TRANSPORTATION KEPT YOU FROM MEETINGS, WORK, OR FROM GETTING THINGS NEEDED FOR DAILY LIVING?: NO

## 2023-04-12 SDOH — HEALTH STABILITY: GENERAL
BECAUSE OF A PHYSICAL, MENTAL, OR EMOTIONAL CONDITION, DO YOU HAVE SERIOUS DIFFICULTY CONCENTRATING, REMEMBERING OR MAKING DECISIONS?: NO

## 2023-04-12 SDOH — ECONOMIC STABILITY: FOOD INSECURITY: HOW OFTEN IN THE PAST 12 MONTHS WERE YOU WORRIED OR STRESSED ABOUT HAVING ENOUGH MONEY TO BUY NUTRITIOUS MEALS?: NEVER

## 2023-04-12 SDOH — HEALTH STABILITY: PHYSICAL HEALTH: DO YOU HAVE SERIOUS DIFFICULTY WALKING OR CLIMBING STAIRS?: NO

## 2023-04-12 SDOH — ECONOMIC STABILITY: HOUSING INSECURITY: WHAT IS YOUR LIVING SITUATION TODAY?: SPOUSE;CHILDREN;FAMILY MEMBERS

## 2023-04-12 SDOH — ECONOMIC STABILITY: HOUSING INSECURITY: WHAT IS YOUR LIVING SITUATION TODAY?: HOUSE

## 2023-04-12 SDOH — ECONOMIC STABILITY: TRANSPORTATION INSECURITY
IN THE PAST 12 MONTHS, HAS THE LACK OF TRANSPORTATION KEPT YOU FROM MEDICAL APPOINTMENTS OR FROM GETTING MEDICATIONS?: NO

## 2023-04-12 SDOH — ECONOMIC STABILITY: GENERAL

## 2023-04-12 SDOH — ECONOMIC STABILITY: HOUSING INSECURITY: ARE YOU WORRIED ABOUT LOSING YOUR HOUSING?: NO

## 2023-04-12 ASSESSMENT — ENCOUNTER SYMPTOMS
NERVOUS/ANXIOUS: 1
CONFUSION: 0
NEUROLOGICAL NEGATIVE: 1
RESPIRATORY NEGATIVE: 1
GASTROINTESTINAL NEGATIVE: 1
CONSTITUTIONAL NEGATIVE: 1

## 2023-04-12 ASSESSMENT — PAIN SCALES - GENERAL
PAINLEVEL_OUTOF10: 0

## 2023-04-12 ASSESSMENT — COGNITIVE AND FUNCTIONAL STATUS - GENERAL
DO YOU HAVE DIFFICULTY DRESSING OR BATHING: NO
DO YOU HAVE SERIOUS DIFFICULTY WALKING OR CLIMBING STAIRS: NO
BECAUSE OF A PHYSICAL, MENTAL, OR EMOTIONAL CONDITION, DO YOU HAVE SERIOUS DIFFICULTY CONCENTRATING, REMEMBERING OR MAKING DECISIONS: YES
BECAUSE OF A PHYSICAL, MENTAL, OR EMOTIONAL CONDITION, DO YOU HAVE DIFFICULTY DOING ERRANDS ALONE: YES

## 2023-04-12 ASSESSMENT — PATIENT HEALTH QUESTIONNAIRE - PHQ9
IS PATIENT ABLE TO COMPLETE PHQ2 OR PHQ9: NO, DEFER TO LATER TIME
IS PATIENT ABLE TO COMPLETE PHQ2 OR PHQ9: NO, DEFER TO LATER TIME

## 2023-04-12 ASSESSMENT — COLUMBIA-SUICIDE SEVERITY RATING SCALE - C-SSRS: IS THE PATIENT ABLE TO COMPLETE C-SSRS: NO, DEFER TO LATER TIME

## 2023-04-12 ASSESSMENT — LIFESTYLE VARIABLES
AUDIT-C TOTAL SCORE: 3
HOW MANY STANDARD DRINKS CONTAINING ALCOHOL DO YOU HAVE ON A TYPICAL DAY: 0,1 OR 2
HOW OFTEN DO YOU HAVE A DRINK CONTAINING ALCOHOL: 2 TO 3 TIMES PER WEEK
HOW OFTEN DO YOU HAVE 6 OR MORE DRINKS ON ONE OCCASION: NEVER
HOW OFTEN DO YOU HAVE 6 OR MORE DRINKS ON ONE OCCASION: NEVER
HOW MANY STANDARD DRINKS CONTAINING ALCOHOL DO YOU HAVE ON A TYPICAL DAY: 0,1 OR 2
ALCOHOL_USE_STATUS: NO OR LOW RISK WITH VALIDATED TOOL
AUDIT-C TOTAL SCORE: 0
HOW OFTEN DO YOU HAVE A DRINK CONTAINING ALCOHOL: NEVER

## 2023-04-12 ASSESSMENT — ACTIVITIES OF DAILY LIVING (ADL)
RECENT_DECLINE_ADL: NO
BATHING: INDEPENDENT
ADL_SCORE: 24
DRESSING: INDEPENDENT
ADL_BEFORE_ADMISSION: INDEPENDENT
ADL_SHORT_OF_BREATH: NO
FEEDING: INDEPENDENT
TOILETING: INDEPENDENT

## 2023-04-13 PROBLEM — F32.A ANXIETY AND DEPRESSION: Status: ACTIVE | Noted: 2023-04-13

## 2023-04-13 PROBLEM — M79.7 FIBROMYALGIA: Status: ACTIVE | Noted: 2023-04-13

## 2023-04-13 PROBLEM — F41.9 ANXIETY AND DEPRESSION: Status: ACTIVE | Noted: 2023-04-13

## 2023-04-13 PROBLEM — G93.40 ACUTE ENCEPHALOPATHY: Status: ACTIVE | Noted: 2023-04-13

## 2023-04-13 PROBLEM — F20.9 SCHIZOPHRENIA (CMD): Status: ACTIVE | Noted: 2023-04-13

## 2023-04-13 PROBLEM — J45.20 MILD INTERMITTENT ASTHMA WITHOUT COMPLICATION: Status: ACTIVE | Noted: 2023-04-13

## 2023-04-13 LAB
AMMONIA PLAS-SCNC: 55 MCMOL/L
FOLATE SERPL-MCNC: 5.6 NG/ML
PHOSPHATE SERPL-MCNC: 4.1 MG/DL (ref 2.4–4.7)
TSH SERPL-ACNC: 4.41 MCUNITS/ML (ref 0.35–5)
VIT B12 SERPL-MCNC: 646 PG/ML (ref 211–911)

## 2023-04-13 PROCEDURE — 82140 ASSAY OF AMMONIA: CPT | Performed by: INTERNAL MEDICINE

## 2023-04-13 PROCEDURE — 97161 PT EVAL LOW COMPLEX 20 MIN: CPT

## 2023-04-13 PROCEDURE — 13003287

## 2023-04-13 PROCEDURE — 10000002 HB ROOM CHARGE MED SURG

## 2023-04-13 PROCEDURE — 13003377

## 2023-04-13 PROCEDURE — 10002803 HB RX 637: Performed by: INTERNAL MEDICINE

## 2023-04-13 PROCEDURE — 10004281 HB COUNTER-STAFF TIME PER 15 MIN

## 2023-04-13 PROCEDURE — 97116 GAIT TRAINING THERAPY: CPT

## 2023-04-13 PROCEDURE — 84100 ASSAY OF PHOSPHORUS: CPT | Performed by: NURSE PRACTITIONER

## 2023-04-13 PROCEDURE — 10002803 HB RX 637: Performed by: NURSE PRACTITIONER

## 2023-04-13 PROCEDURE — 82607 VITAMIN B-12: CPT | Performed by: INTERNAL MEDICINE

## 2023-04-13 PROCEDURE — 10004651 HB RX, NO CHARGE ITEM: Performed by: NURSE PRACTITIONER

## 2023-04-13 PROCEDURE — 82728 ASSAY OF FERRITIN: CPT | Performed by: INTERNAL MEDICINE

## 2023-04-13 PROCEDURE — 84443 ASSAY THYROID STIM HORMONE: CPT | Performed by: INTERNAL MEDICINE

## 2023-04-13 PROCEDURE — 99223 1ST HOSP IP/OBS HIGH 75: CPT | Performed by: INTERNAL MEDICINE

## 2023-04-13 PROCEDURE — 83540 ASSAY OF IRON: CPT | Performed by: INTERNAL MEDICINE

## 2023-04-13 PROCEDURE — 36415 COLL VENOUS BLD VENIPUNCTURE: CPT | Performed by: NURSE PRACTITIONER

## 2023-04-13 PROCEDURE — 10002800 HB RX 250 W HCPCS: Performed by: SURGERY

## 2023-04-13 PROCEDURE — 10002803 HB RX 637: Performed by: SURGERY

## 2023-04-13 RX ORDER — LACTULOSE 10 G/15ML
10 SOLUTION ORAL DAILY
Status: DISCONTINUED | OUTPATIENT
Start: 2023-04-13 | End: 2023-04-16 | Stop reason: HOSPADM

## 2023-04-13 RX ADMIN — LEVETIRACETAM 750 MG: 500 TABLET, FILM COATED ORAL at 21:14

## 2023-04-13 RX ADMIN — ENOXAPARIN SODIUM 30 MG: 30 INJECTION SUBCUTANEOUS at 08:11

## 2023-04-13 RX ADMIN — ACETAMINOPHEN 650 MG: 325 TABLET ORAL at 21:14

## 2023-04-13 RX ADMIN — FLUTICASONE FUROATE AND VILANTEROL TRIFENATATE 1 PUFF: 100; 25 POWDER RESPIRATORY (INHALATION) at 09:39

## 2023-04-13 RX ADMIN — LITHIUM CARBONATE 600 MG: 300 TABLET, EXTENDED RELEASE ORAL at 21:14

## 2023-04-13 RX ADMIN — QUETIAPINE FUMARATE 300 MG: 300 TABLET, FILM COATED ORAL at 21:14

## 2023-04-13 RX ADMIN — POLYETHYLENE GLYCOL 3350 17 G: 17 POWDER, FOR SOLUTION ORAL at 08:12

## 2023-04-13 RX ADMIN — ENOXAPARIN SODIUM 30 MG: 30 INJECTION SUBCUTANEOUS at 21:18

## 2023-04-13 RX ADMIN — ACETAMINOPHEN 650 MG: 325 TABLET ORAL at 15:43

## 2023-04-13 RX ADMIN — ACETAMINOPHEN 650 MG: 325 TABLET ORAL at 08:11

## 2023-04-13 RX ADMIN — LACTULOSE 10 G: 10 SOLUTION ORAL at 15:45

## 2023-04-13 RX ADMIN — TRAZODONE HYDROCHLORIDE 100 MG: 100 TABLET ORAL at 21:14

## 2023-04-13 RX ADMIN — LEVETIRACETAM 750 MG: 500 TABLET, FILM COATED ORAL at 08:11

## 2023-04-13 ASSESSMENT — PAIN SCALES - GENERAL
PAINLEVEL_OUTOF10: 3
PAINLEVEL_OUTOF10: 0
PAINLEVEL_OUTOF10: 0
PAINLEVEL_OUTOF10: 10
PAINLEVEL_OUTOF10: 4
PAINLEVEL_OUTOF10: 3

## 2023-04-13 ASSESSMENT — ACTIVITIES OF DAILY LIVING (ADL): PRIOR_ADL: MODIFIED INDEPENDENT

## 2023-04-13 ASSESSMENT — ENCOUNTER SYMPTOMS: PAIN SEVERITY NOW: 6

## 2023-04-14 LAB
FERRITIN SERPL-MCNC: 84 NG/ML (ref 8–252)
IRON SATN MFR SERPL: 14 % (ref 15–45)
IRON SERPL-MCNC: 48 MCG/DL (ref 50–170)
TIBC SERPL-MCNC: 345 MCG/DL (ref 250–450)

## 2023-04-14 PROCEDURE — 10002803 HB RX 637: Performed by: NURSE PRACTITIONER

## 2023-04-14 PROCEDURE — 97165 OT EVAL LOW COMPLEX 30 MIN: CPT

## 2023-04-14 PROCEDURE — 10004651 HB RX, NO CHARGE ITEM: Performed by: NURSE PRACTITIONER

## 2023-04-14 PROCEDURE — 10000002 HB ROOM CHARGE MED SURG

## 2023-04-14 PROCEDURE — 10002803 HB RX 637: Performed by: INTERNAL MEDICINE

## 2023-04-14 PROCEDURE — 99233 SBSQ HOSP IP/OBS HIGH 50: CPT | Performed by: INTERNAL MEDICINE

## 2023-04-14 PROCEDURE — 99221 1ST HOSP IP/OBS SF/LOW 40: CPT | Performed by: PSYCHIATRY & NEUROLOGY

## 2023-04-14 PROCEDURE — 97535 SELF CARE MNGMENT TRAINING: CPT

## 2023-04-14 PROCEDURE — 10002800 HB RX 250 W HCPCS: Performed by: SURGERY

## 2023-04-14 RX ORDER — BUTALBITAL, ACETAMINOPHEN AND CAFFEINE 50; 325; 40 MG/1; MG/1; MG/1
1 TABLET ORAL EVERY 4 HOURS PRN
Status: DISCONTINUED | OUTPATIENT
Start: 2023-04-14 | End: 2023-04-14

## 2023-04-14 RX ORDER — QUETIAPINE FUMARATE 25 MG/1
50 TABLET, FILM COATED ORAL 2 TIMES DAILY PRN
Status: DISCONTINUED | OUTPATIENT
Start: 2023-04-14 | End: 2023-04-16 | Stop reason: HOSPADM

## 2023-04-14 RX ORDER — LIDOCAINE 4 G/G
1 PATCH TOPICAL DAILY
Status: DISCONTINUED | OUTPATIENT
Start: 2023-04-14 | End: 2023-04-16 | Stop reason: HOSPADM

## 2023-04-14 RX ORDER — LANOLIN ALCOHOL/MO/W.PET/CERES
3 CREAM (GRAM) TOPICAL NIGHTLY PRN
Status: DISCONTINUED | OUTPATIENT
Start: 2023-04-14 | End: 2023-04-16 | Stop reason: HOSPADM

## 2023-04-14 RX ORDER — AMOXICILLIN 250 MG
1 CAPSULE ORAL 2 TIMES DAILY PRN
Status: DISCONTINUED | OUTPATIENT
Start: 2023-04-14 | End: 2023-04-16 | Stop reason: HOSPADM

## 2023-04-14 RX ORDER — POLYETHYLENE GLYCOL 3350 17 G/17G
17 POWDER, FOR SOLUTION ORAL DAILY PRN
Status: DISCONTINUED | OUTPATIENT
Start: 2023-04-14 | End: 2023-04-16 | Stop reason: HOSPADM

## 2023-04-14 RX ADMIN — LITHIUM CARBONATE 600 MG: 300 TABLET, EXTENDED RELEASE ORAL at 20:15

## 2023-04-14 RX ADMIN — ENOXAPARIN SODIUM 30 MG: 30 INJECTION SUBCUTANEOUS at 20:14

## 2023-04-14 RX ADMIN — FLUTICASONE FUROATE AND VILANTEROL TRIFENATATE 1 PUFF: 100; 25 POWDER RESPIRATORY (INHALATION) at 08:37

## 2023-04-14 RX ADMIN — ACETAMINOPHEN 650 MG: 325 TABLET ORAL at 08:33

## 2023-04-14 RX ADMIN — ENOXAPARIN SODIUM 30 MG: 30 INJECTION SUBCUTANEOUS at 08:33

## 2023-04-14 RX ADMIN — LACTULOSE 10 G: 10 SOLUTION ORAL at 08:33

## 2023-04-14 RX ADMIN — BUTALBITAL, ACETAMINOPHEN, AND CAFFEINE 1 TABLET: 50; 325; 40 TABLET ORAL at 12:20

## 2023-04-14 RX ADMIN — ACETAMINOPHEN 650 MG: 325 TABLET ORAL at 14:43

## 2023-04-14 RX ADMIN — TRAZODONE HYDROCHLORIDE 100 MG: 100 TABLET ORAL at 20:15

## 2023-04-14 RX ADMIN — LIDOCAINE 1 PATCH: 4 PATCH TOPICAL at 12:20

## 2023-04-14 RX ADMIN — LEVETIRACETAM 750 MG: 500 TABLET, FILM COATED ORAL at 08:33

## 2023-04-14 RX ADMIN — POLYETHYLENE GLYCOL 3350 17 G: 17 POWDER, FOR SOLUTION ORAL at 08:33

## 2023-04-14 RX ADMIN — QUETIAPINE FUMARATE 300 MG: 300 TABLET, FILM COATED ORAL at 20:14

## 2023-04-14 RX ADMIN — LEVETIRACETAM 750 MG: 500 TABLET, FILM COATED ORAL at 20:52

## 2023-04-14 ASSESSMENT — COGNITIVE AND FUNCTIONAL STATUS - GENERAL
HELP NEEDED FOR BATHING: A LITTLE
HELP NEEDED FOR TOILETING: A LITTLE
DAILY_ACTIVITY_RAW_SCORE: 21
DAILY_ACTIVITY_CONVERTED_SCORE: 44.27
HELP NEEDED DRESSING REGULAR LOWER BODY CLOTHING: A LITTLE

## 2023-04-14 ASSESSMENT — PAIN SCALES - GENERAL
PAINLEVEL_OUTOF10: 0
PAINLEVEL_OUTOF10: 5
PAINLEVEL_OUTOF10: 2
PAINLEVEL_OUTOF10: 3
PAINLEVEL_OUTOF10: 0

## 2023-04-14 ASSESSMENT — ACTIVITIES OF DAILY LIVING (ADL): HOME_MANAGEMENT_TIME_ENTRY: 12

## 2023-04-15 PROCEDURE — 10002803 HB RX 637: Performed by: INTERNAL MEDICINE

## 2023-04-15 PROCEDURE — 10002803 HB RX 637: Performed by: NURSE PRACTITIONER

## 2023-04-15 PROCEDURE — 10002800 HB RX 250 W HCPCS: Performed by: INTERNAL MEDICINE

## 2023-04-15 PROCEDURE — 10002800 HB RX 250 W HCPCS: Performed by: SURGERY

## 2023-04-15 PROCEDURE — 99233 SBSQ HOSP IP/OBS HIGH 50: CPT | Performed by: INTERNAL MEDICINE

## 2023-04-15 PROCEDURE — 10000002 HB ROOM CHARGE MED SURG

## 2023-04-15 PROCEDURE — 10004651 HB RX, NO CHARGE ITEM: Performed by: NURSE PRACTITIONER

## 2023-04-15 RX ORDER — LIDOCAINE 4 G/G
1 PATCH TOPICAL DAILY
Status: DISCONTINUED | OUTPATIENT
Start: 2023-04-15 | End: 2023-04-16 | Stop reason: HOSPADM

## 2023-04-15 RX ORDER — DIPHENHYDRAMINE HYDROCHLORIDE 50 MG/ML
25 INJECTION INTRAMUSCULAR; INTRAVENOUS EVERY 6 HOURS
Status: COMPLETED | OUTPATIENT
Start: 2023-04-15 | End: 2023-04-16

## 2023-04-15 RX ORDER — KETOROLAC TROMETHAMINE 15 MG/ML
15 INJECTION, SOLUTION INTRAMUSCULAR; INTRAVENOUS EVERY 6 HOURS SCHEDULED
Status: COMPLETED | OUTPATIENT
Start: 2023-04-15 | End: 2023-04-16

## 2023-04-15 RX ORDER — LITHIUM CARBONATE 300 MG/1
600 TABLET, FILM COATED, EXTENDED RELEASE ORAL NIGHTLY
Status: SHIPPED | COMMUNITY
Start: 2023-04-15

## 2023-04-15 RX ORDER — BUTALBITAL, ACETAMINOPHEN AND CAFFEINE 50; 325; 40 MG/1; MG/1; MG/1
1 TABLET ORAL EVERY 6 HOURS PRN
Qty: 15 TABLET | Refills: 0 | Status: SHIPPED | OUTPATIENT
Start: 2023-04-15

## 2023-04-15 RX ORDER — LEVETIRACETAM 750 MG/1
750 TABLET ORAL EVERY 12 HOURS SCHEDULED
Qty: 10 TABLET | Refills: 0 | Status: SHIPPED | OUTPATIENT
Start: 2023-04-15 | End: 2023-04-20

## 2023-04-15 RX ORDER — DEXAMETHASONE SODIUM PHOSPHATE 4 MG/ML
4 INJECTION, SOLUTION INTRA-ARTICULAR; INTRALESIONAL; INTRAMUSCULAR; INTRAVENOUS; SOFT TISSUE EVERY 6 HOURS SCHEDULED
Status: COMPLETED | OUTPATIENT
Start: 2023-04-15 | End: 2023-04-16

## 2023-04-15 RX ORDER — LACTULOSE 10 G/15ML
10 SOLUTION ORAL DAILY
Qty: 237 ML | Refills: 0 | Status: SHIPPED | OUTPATIENT
Start: 2023-04-16

## 2023-04-15 RX ORDER — DEXAMETHASONE SODIUM PHOSPHATE 10 MG/ML
10 INJECTION, SOLUTION INTRAMUSCULAR; INTRAVENOUS ONCE
Status: COMPLETED | OUTPATIENT
Start: 2023-04-15 | End: 2023-04-15

## 2023-04-15 RX ORDER — FOLIC ACID 1 MG/1
1 TABLET ORAL DAILY
Status: DISCONTINUED | OUTPATIENT
Start: 2023-04-15 | End: 2023-04-16 | Stop reason: HOSPADM

## 2023-04-15 RX ORDER — ACETAMINOPHEN 325 MG/1
325 TABLET ORAL EVERY 4 HOURS PRN
Status: DISCONTINUED | OUTPATIENT
Start: 2023-04-15 | End: 2023-04-16 | Stop reason: HOSPADM

## 2023-04-15 RX ORDER — LIDOCAINE 4 G/G
1 PATCH TOPICAL DAILY
Qty: 15 PATCH | Refills: 0 | Status: SHIPPED | OUTPATIENT
Start: 2023-04-16

## 2023-04-15 RX ORDER — FERROUS SULFATE 325(65) MG
325 TABLET ORAL 2 TIMES DAILY WITH MEALS
Status: DISCONTINUED | OUTPATIENT
Start: 2023-04-15 | End: 2023-04-16 | Stop reason: HOSPADM

## 2023-04-15 RX ORDER — FERROUS SULFATE 325(65) MG
325 TABLET ORAL 2 TIMES DAILY WITH MEALS
Qty: 60 TABLET | Refills: 0 | Status: SHIPPED | OUTPATIENT
Start: 2023-04-15

## 2023-04-15 RX ORDER — ACETAMINOPHEN 325 MG/1
650 TABLET ORAL EVERY 8 HOURS PRN
Status: DISCONTINUED | OUTPATIENT
Start: 2023-04-15 | End: 2023-04-16 | Stop reason: HOSPADM

## 2023-04-15 RX ORDER — FOLIC ACID 1 MG/1
1 TABLET ORAL DAILY
Qty: 30 TABLET | Refills: 0 | Status: SHIPPED | OUTPATIENT
Start: 2023-04-16

## 2023-04-15 RX ORDER — QUETIAPINE FUMARATE 300 MG/1
300 TABLET, FILM COATED ORAL NIGHTLY
Status: SHIPPED | COMMUNITY
Start: 2023-04-15

## 2023-04-15 RX ORDER — PROCHLORPERAZINE EDISYLATE 5 MG/ML
10 INJECTION INTRAMUSCULAR; INTRAVENOUS EVERY 6 HOURS
Status: DISCONTINUED | OUTPATIENT
Start: 2023-04-15 | End: 2023-04-16 | Stop reason: HOSPADM

## 2023-04-15 RX ORDER — TRAZODONE HYDROCHLORIDE 100 MG/1
100 TABLET ORAL NIGHTLY
Qty: 30 TABLET | Status: SHIPPED | COMMUNITY
Start: 2023-04-15

## 2023-04-15 RX ADMIN — PROCHLORPERAZINE EDISYLATE 10 MG: 5 INJECTION INTRAMUSCULAR; INTRAVENOUS at 16:51

## 2023-04-15 RX ADMIN — FERROUS SULFATE TAB 325 MG (65 MG ELEMENTAL FE) 325 MG: 325 (65 FE) TAB at 09:23

## 2023-04-15 RX ADMIN — DIPHENHYDRAMINE HYDROCHLORIDE 25 MG: 50 INJECTION, SOLUTION INTRAMUSCULAR; INTRAVENOUS at 11:07

## 2023-04-15 RX ADMIN — FERROUS SULFATE TAB 325 MG (65 MG ELEMENTAL FE) 325 MG: 325 (65 FE) TAB at 16:51

## 2023-04-15 RX ADMIN — DEXAMETHASONE SODIUM PHOSPHATE 4 MG: 4 INJECTION INTRA-ARTICULAR; INTRALESIONAL; INTRAMUSCULAR; INTRAVENOUS; SOFT TISSUE at 16:52

## 2023-04-15 RX ADMIN — LIDOCAINE 1 PATCH: 4 PATCH TOPICAL at 08:06

## 2023-04-15 RX ADMIN — LEVETIRACETAM 750 MG: 500 TABLET, FILM COATED ORAL at 21:31

## 2023-04-15 RX ADMIN — TRAZODONE HYDROCHLORIDE 100 MG: 100 TABLET ORAL at 21:31

## 2023-04-15 RX ADMIN — LEVETIRACETAM 750 MG: 500 TABLET, FILM COATED ORAL at 08:09

## 2023-04-15 RX ADMIN — POLYETHYLENE GLYCOL 3350 17 G: 17 POWDER, FOR SOLUTION ORAL at 08:06

## 2023-04-15 RX ADMIN — LACTULOSE 10 G: 10 SOLUTION ORAL at 08:08

## 2023-04-15 RX ADMIN — FOLIC ACID 1 MG: 1 TABLET ORAL at 09:23

## 2023-04-15 RX ADMIN — DEXAMETHASONE SODIUM PHOSPHATE 10 MG: 10 INJECTION INTRAMUSCULAR; INTRAVENOUS at 11:06

## 2023-04-15 RX ADMIN — DIPHENHYDRAMINE HYDROCHLORIDE 25 MG: 50 INJECTION, SOLUTION INTRAMUSCULAR; INTRAVENOUS at 21:31

## 2023-04-15 RX ADMIN — DIPHENHYDRAMINE HYDROCHLORIDE 25 MG: 50 INJECTION, SOLUTION INTRAMUSCULAR; INTRAVENOUS at 16:51

## 2023-04-15 RX ADMIN — BUTALBITAL, ACETAMINOPHEN, AND CAFFEINE 1 TABLET: 50; 325; 40 TABLET ORAL at 08:09

## 2023-04-15 RX ADMIN — ENOXAPARIN SODIUM 30 MG: 30 INJECTION SUBCUTANEOUS at 08:09

## 2023-04-15 RX ADMIN — QUETIAPINE FUMARATE 300 MG: 300 TABLET, FILM COATED ORAL at 21:31

## 2023-04-15 RX ADMIN — ACETAMINOPHEN 650 MG: 325 TABLET ORAL at 09:23

## 2023-04-15 RX ADMIN — PROCHLORPERAZINE EDISYLATE 10 MG: 5 INJECTION INTRAMUSCULAR; INTRAVENOUS at 12:29

## 2023-04-15 RX ADMIN — KETOROLAC TROMETHAMINE 15 MG: 15 INJECTION, SOLUTION INTRAMUSCULAR; INTRAVENOUS at 12:29

## 2023-04-15 RX ADMIN — KETOROLAC TROMETHAMINE 15 MG: 15 INJECTION, SOLUTION INTRAMUSCULAR; INTRAVENOUS at 17:27

## 2023-04-15 RX ADMIN — ENOXAPARIN SODIUM 30 MG: 30 INJECTION SUBCUTANEOUS at 21:31

## 2023-04-15 RX ADMIN — LITHIUM CARBONATE 600 MG: 300 TABLET, EXTENDED RELEASE ORAL at 21:31

## 2023-04-15 ASSESSMENT — PAIN SCALES - GENERAL
PAINLEVEL_OUTOF10: 0
PAINLEVEL_OUTOF10: 5
PAINLEVEL_OUTOF10: 4
PAINLEVEL_OUTOF10: 3

## 2023-04-16 VITALS
WEIGHT: 174.6 LBS | SYSTOLIC BLOOD PRESSURE: 112 MMHG | DIASTOLIC BLOOD PRESSURE: 69 MMHG | BODY MASS INDEX: 30.94 KG/M2 | TEMPERATURE: 97.5 F | RESPIRATION RATE: 16 BRPM | HEIGHT: 63 IN | OXYGEN SATURATION: 97 % | HEART RATE: 71 BPM

## 2023-04-16 PROCEDURE — 10002800 HB RX 250 W HCPCS: Performed by: INTERNAL MEDICINE

## 2023-04-16 PROCEDURE — 10002803 HB RX 637: Performed by: NURSE PRACTITIONER

## 2023-04-16 PROCEDURE — 10002800 HB RX 250 W HCPCS: Performed by: SURGERY

## 2023-04-16 PROCEDURE — 10004651 HB RX, NO CHARGE ITEM: Performed by: NURSE PRACTITIONER

## 2023-04-16 PROCEDURE — 10004281 HB COUNTER-STAFF TIME PER 15 MIN

## 2023-04-16 PROCEDURE — 99239 HOSP IP/OBS DSCHRG MGMT >30: CPT | Performed by: INTERNAL MEDICINE

## 2023-04-16 PROCEDURE — 10002803 HB RX 637: Performed by: INTERNAL MEDICINE

## 2023-04-16 PROCEDURE — 13003377

## 2023-04-16 RX ORDER — NAPROXEN 375 MG/1
375 TABLET ORAL 2 TIMES DAILY WITH MEALS
Qty: 10 TABLET | Refills: 0 | Status: SHIPPED | OUTPATIENT
Start: 2023-04-17 | End: 2023-04-22

## 2023-04-16 RX ORDER — KETOROLAC TROMETHAMINE 15 MG/ML
15 INJECTION, SOLUTION INTRAMUSCULAR; INTRAVENOUS ONCE
Status: COMPLETED | OUTPATIENT
Start: 2023-04-16 | End: 2023-04-16

## 2023-04-16 RX ADMIN — PROCHLORPERAZINE EDISYLATE 10 MG: 5 INJECTION INTRAMUSCULAR; INTRAVENOUS at 06:28

## 2023-04-16 RX ADMIN — ENOXAPARIN SODIUM 30 MG: 30 INJECTION SUBCUTANEOUS at 09:43

## 2023-04-16 RX ADMIN — KETOROLAC TROMETHAMINE 15 MG: 15 INJECTION, SOLUTION INTRAMUSCULAR; INTRAVENOUS at 01:01

## 2023-04-16 RX ADMIN — BUTALBITAL, ACETAMINOPHEN, AND CAFFEINE 1 TABLET: 50; 325; 40 TABLET ORAL at 09:41

## 2023-04-16 RX ADMIN — KETOROLAC TROMETHAMINE 15 MG: 15 INJECTION, SOLUTION INTRAMUSCULAR; INTRAVENOUS at 13:32

## 2023-04-16 RX ADMIN — PROCHLORPERAZINE EDISYLATE 10 MG: 5 INJECTION INTRAMUSCULAR; INTRAVENOUS at 09:46

## 2023-04-16 RX ADMIN — LACTULOSE 10 G: 10 SOLUTION ORAL at 09:43

## 2023-04-16 RX ADMIN — DEXAMETHASONE SODIUM PHOSPHATE 4 MG: 4 INJECTION INTRA-ARTICULAR; INTRALESIONAL; INTRAMUSCULAR; INTRAVENOUS; SOFT TISSUE at 09:40

## 2023-04-16 RX ADMIN — LEVETIRACETAM 750 MG: 500 TABLET, FILM COATED ORAL at 09:41

## 2023-04-16 RX ADMIN — DIPHENHYDRAMINE HYDROCHLORIDE 25 MG: 50 INJECTION, SOLUTION INTRAMUSCULAR; INTRAVENOUS at 04:42

## 2023-04-16 RX ADMIN — FOLIC ACID 1 MG: 1 TABLET ORAL at 09:40

## 2023-04-16 RX ADMIN — PROCHLORPERAZINE EDISYLATE 10 MG: 5 INJECTION INTRAMUSCULAR; INTRAVENOUS at 00:57

## 2023-04-16 RX ADMIN — LIDOCAINE 1 PATCH: 4 PATCH TOPICAL at 09:44

## 2023-04-16 RX ADMIN — KETOROLAC TROMETHAMINE 15 MG: 15 INJECTION, SOLUTION INTRAMUSCULAR; INTRAVENOUS at 06:32

## 2023-04-16 RX ADMIN — POLYETHYLENE GLYCOL 3350 17 G: 17 POWDER, FOR SOLUTION ORAL at 09:41

## 2023-04-16 RX ADMIN — LIDOCAINE 1 PATCH: 4 PATCH TOPICAL at 09:42

## 2023-04-16 RX ADMIN — ACETAMINOPHEN 325 MG: 325 TABLET ORAL at 09:41

## 2023-04-16 RX ADMIN — DEXAMETHASONE SODIUM PHOSPHATE 4 MG: 4 INJECTION INTRA-ARTICULAR; INTRALESIONAL; INTRAMUSCULAR; INTRAVENOUS; SOFT TISSUE at 00:53

## 2023-04-16 RX ADMIN — FERROUS SULFATE TAB 325 MG (65 MG ELEMENTAL FE) 325 MG: 325 (65 FE) TAB at 09:41

## 2023-04-16 ASSESSMENT — PAIN SCALES - GENERAL
PAINLEVEL_OUTOF10: 4
PAINLEVEL_OUTOF10: 6
PAINLEVEL_OUTOF10: 8

## 2023-04-21 ENCOUNTER — TELEPHONE (OUTPATIENT)
Dept: FAMILY MEDICINE | Age: 43
End: 2023-04-21

## 2023-12-27 ENCOUNTER — HOSPITAL ENCOUNTER (OUTPATIENT)
Age: 43
Discharge: HOME | End: 2023-12-27
Attending: EMERGENCY MEDICINE
Payer: COMMERCIAL

## 2023-12-27 VITALS
TEMPERATURE: 98 F | RESPIRATION RATE: 18 BRPM | SYSTOLIC BLOOD PRESSURE: 113 MMHG | HEIGHT: 62 IN | WEIGHT: 133 LBS | DIASTOLIC BLOOD PRESSURE: 72 MMHG | BODY MASS INDEX: 24.48 KG/M2 | OXYGEN SATURATION: 100 % | HEART RATE: 79 BPM

## 2023-12-27 DIAGNOSIS — G62.9 NEUROPATHY: Primary | ICD-10-CM

## 2023-12-27 PROCEDURE — 99214 OFFICE O/P EST MOD 30 MIN: CPT

## 2023-12-27 PROCEDURE — 99213 OFFICE O/P EST LOW 20 MIN: CPT

## 2023-12-27 RX ORDER — GABAPENTIN 100 MG/1
100 CAPSULE ORAL 3 TIMES DAILY
Qty: 60 CAPSULE | Refills: 0 | Status: SHIPPED | OUTPATIENT
Start: 2023-12-27

## 2023-12-27 NOTE — ED INITIAL ASSESSMENT (HPI)
Dr. Tiffanie Garcia at the bedside. Patient here for evaluation of pain, numbness, tingling, and tremors of the hands and feet. States it started 5 days ago.

## 2023-12-27 NOTE — DISCHARGE INSTRUCTIONS
Follow-up with Dr. Daily Huber for a new primary care doctor  Call [facility] for further evaluation of your neuropathy  Take Neurontin 100 mg 3 times a day forNumbness and tingling and nerve pain

## 2024-03-28 ENCOUNTER — OFFICE VISIT (OUTPATIENT)
Facility: CLINIC | Age: 44
End: 2024-03-28
Payer: COMMERCIAL

## 2024-03-28 VITALS
WEIGHT: 160 LBS | HEIGHT: 62 IN | SYSTOLIC BLOOD PRESSURE: 114 MMHG | DIASTOLIC BLOOD PRESSURE: 72 MMHG | BODY MASS INDEX: 29.44 KG/M2

## 2024-03-28 DIAGNOSIS — Z01.419 ENCOUNTER FOR WELL WOMAN EXAM WITH ROUTINE GYNECOLOGICAL EXAM: ICD-10-CM

## 2024-03-28 DIAGNOSIS — Z97.5 IUD CONTRACEPTION: ICD-10-CM

## 2024-03-28 DIAGNOSIS — Z00.00 ANNUAL PHYSICAL EXAM: ICD-10-CM

## 2024-03-28 DIAGNOSIS — Z12.31 VISIT FOR SCREENING MAMMOGRAM: Primary | ICD-10-CM

## 2024-03-28 PROCEDURE — 3074F SYST BP LT 130 MM HG: CPT | Performed by: OBSTETRICS & GYNECOLOGY

## 2024-03-28 PROCEDURE — 87624 HPV HI-RISK TYP POOLED RSLT: CPT | Performed by: OBSTETRICS & GYNECOLOGY

## 2024-03-28 PROCEDURE — 3008F BODY MASS INDEX DOCD: CPT | Performed by: OBSTETRICS & GYNECOLOGY

## 2024-03-28 PROCEDURE — 3078F DIAST BP <80 MM HG: CPT | Performed by: OBSTETRICS & GYNECOLOGY

## 2024-03-28 PROCEDURE — 99386 PREV VISIT NEW AGE 40-64: CPT | Performed by: OBSTETRICS & GYNECOLOGY

## 2024-03-28 RX ORDER — LEVOTHYROXINE SODIUM 0.07 MG/1
75 TABLET ORAL
COMMUNITY
Start: 2024-01-21

## 2024-03-28 RX ORDER — LEVETIRACETAM 750 MG/1
1 TABLET ORAL
COMMUNITY
Start: 2024-03-20

## 2024-03-28 NOTE — PROGRESS NOTES
GYN H&P     Genetic questionnaire reviewed with the patient and she will be referred for genetic counseling if the questionnaire had any positive results.    The [facility] intake form was also reviewed regarding contraception, menstrual periods, urinary health, and vaginal / sexual health    3/28/2024  4:40 PM    Chief Complaint   Patient presents with    Physical     Wishes to get Mirena IUD replaced soon       HPI: Idania is a 44 year old  No LMP recorded. (Menstrual status: IUD - Intrauterine Device).  (contraception: Mirena IUD - needs a new one) here for her annual gyn exam.     She has no complaints.  no Menses on H-IUD. Denies any pelvic or breast complaints.   Satisfied with current contraception.     Has not seen us in 4 years    Previous encounters and chart reviewed.     OB History    Para Term  AB Living   2 2 2     2   SAB IAB Ectopic Multiple Live Births           2      # Outcome Date GA Lbr Jose Rafael/2nd Weight Sex Delivery Anes PTL Lv   2 Term 06 39w0d  8 lb 5 oz (3.771 kg) M NORMAL SPONT   LUIS   1 Term 02/15/03 38w0d  7 lb 7 oz (3.374 kg) M NORMAL SPONT   LUIS       GYN hx:   Menarche: 11  Period Cycle (Days): no cycles on Mirena IUD  Use of Birth Control (if yes, specify type): Mirena IUD  Date When Birth Control Last Used: mirena iud inserted 2015  Hx Prior Abnormal Pap: No  Pap Date: 20  Pap Result Notes: 2020 neg/neg; 2015 neg/neg; 2013 neg/neg; 11/15/2011 neg; 10/20/2010 neg  Follow Up Recommendation: due      Past Medical History:   Diagnosis Date    Anxiety     Anxiety state, unspecified     also PTSD    Asthma (HCC)     Back problem     Bipolar 2 disorder (HCC)     Dementia (HCC)     Depression     Extrinsic asthma, unspecified     Fibromyalgia     Migraines     Neuropathy     legs    Pain     Pancreatitis (HCC)     Pancreatitis (HCC)     Reflux     Sleep apnea     uses cpap at night     Past Surgical History:   Procedure  Laterality Date    CHOLECYSTECTOMY      COLONOSCOPY & POLYPECTOMY  5/14    polyp; repeat 5 yrs    COLONOSCOPY,REMV LESN,SNARE N/A 5/30/2014    Procedure: COLONOSCOPY, POSSIBLE BIOPSY, POSSIBLE POLYPECTOMY 66045;  Surgeon: Luis Fernando Read MD;  Location: [facility], [facility]    ELBOW ARTHROSCOP,DIAGNOSTIC      left    OTHER      Mass in the left arm    OTHER SURGICAL HISTORY      right ankle surg x 3 times - no metal    OTHER SURGICAL HISTORY      EGD x 5 times    OTHER SURGICAL HISTORY      bile duct stent    OTHER SURGICAL HISTORY  08/16/16    left elbow cyst    REMOVAL GALLBLADDER       Allergies   Allergen Reactions    Alprazolam OTHER (SEE COMMENTS)     due to amnestic episodes of delirium  Patient denies 12/16    Cyclobenzaprine OTHER (SEE COMMENTS)     due to amnestic episodes of delirium  Patient denies 12/16    Seasonal OTHER (SEE COMMENTS)     Runny nose     Current Outpatient Medications on File Prior to Visit   Medication Sig Dispense Refill    levetiracetam 750 MG Oral Tab Take 1 tablet (750 mg total) by mouth before breakfast.      levothyroxine 75 MCG Oral Tab Take 1 tablet (75 mcg total) by mouth every morning before breakfast.      LORazepam 1 MG Oral Tab Take 1 tablet (1 mg total) by mouth every 6 (six) hours as needed for Anxiety. 120 tablet 0    QUEtiapine 300 MG Oral Tab Take 1 tablet (300 mg total) by mouth nightly. 30 tablet 2    Albuterol Sulfate  (90 BASE) MCG/ACT Inhalation Aero Soln Inhale 3 puffs into the lungs as needed for Wheezing.      gabapentin (NEURONTIN) 100 MG Oral Cap Take 1 capsule (100 mg total) by mouth 3 (three) times daily. (Patient not taking: Reported on 3/28/2024) 60 capsule 0    traZODone 100 MG Oral Tab Take 1 tablet (100 mg total) by mouth nightly as needed for Sleep. (Patient not taking: Reported on 3/28/2024) 60 tablet 2    lidocaine-menthol 4-1 % External Patch Place 1 patch onto the skin daily. (Patient not taking: Reported on 3/28/2024) 15 patch 0     Lithium Carbonate  MG Oral Tab CR Take 2 tablets (600 mg total) by mouth nightly. (Patient not taking: Reported on 3/28/2024) 60 tablet 5    Fluticasone Furoate-Vilanterol 100-25 MCG/INH Inhalation Aerosol Powder, Breath Activated Inhale 1 puff into the lungs daily. (Patient not taking: Reported on 3/28/2024) 28 each 0     No current facility-administered medications on file prior to visit.     Family History   Problem Relation Age of Onset    Breast Cancer Sister 38    Ovarian Cancer Sister 38    Other (lupus) Sister     Other (strokes) Sister     Psychiatric Mother     Diabetes Mother     Other (Other) Mother         thyroid    Heart Disease Father     Cancer Neg     Stroke Neg      Social History     Socioeconomic History    Marital status:      Spouse name: Not on file    Number of children: Not on file    Years of education: Not on file    Highest education level: Not on file   Occupational History    Not on file   Tobacco Use    Smoking status: Every Day     Packs/day: 0.50     Years: 21.00     Additional pack years: 0.00     Total pack years: 10.50     Types: Cigarettes    Smokeless tobacco: Never    Tobacco comments:     passive smoker   Vaping Use    Vaping Use: Never used   Substance and Sexual Activity    Alcohol use: Not Currently     Comment: rare    Drug use: No    Sexual activity: Yes     Partners: Male     Birth control/protection: I.U.D.   Other Topics Concern     Service Not Asked    Blood Transfusions Not Asked    Caffeine Concern Yes     Comment: soda occasionally    Occupational Exposure Not Asked    Hobby Hazards Not Asked    Sleep Concern Not Asked    Stress Concern Not Asked    Weight Concern Not Asked    Special Diet Not Asked    Back Care Not Asked    Exercise No     Comment: PT once aweek     Bike Helmet Not Asked    Seat Belt Not Asked    Self-Exams Not Asked   Social History Narrative    Not on file     Social Determinants of Health     Financial Resource Strain: Not  on file   Food Insecurity: Not on file   Transportation Needs: Not on file   Physical Activity: Not on file   Stress: Not on file   Social Connections: Not on file   Housing Stability: Not on file       ROS:     Review of Systems:  General: denies fevers, chills, fatigue and malaise.   Eyes: no visual changes, denies headaches  ENT: no complaints, denies earaches, runny nose, epistaxis, throat pain or sore throat  Respiratory: denies SOB, dyspnea, cough or wheezing  Cardiovascular: denies chest pain, palpitations, exercise intolerance   GI: denies abdominal pain, diarrhea, constipation  : no complaints, denies dysuria, increased urinary frequency. , no dyspareunia   Hematological/lymphatic: denies history of excessive bleeding or bruising, denies dizziness, lightheadedness.   Breast: denies rashes, skin changes, pain, lumps or discharge   Psychiatric: denies depression, changes in sleep patterns, anxiety  Endocrine: denies hot or cold intolerance, mood changes   Neurological: denies changes in sight, smell, hearing or taste. Denies seizures or tremors  Immunological: denies allergies, denies anaphylaxis, or swollen lymph nodes  Musculoskeletal: denies joint pain, morning stiffness, decreased range of motion   Skin:  post piercing in chest, tattoos on extremities      O /72   Ht 62\"   Wt 160 lb (72.6 kg)   BMI 29.26 kg/m²         Wt Readings from Last 6 Encounters:   03/28/24 160 lb (72.6 kg)   12/27/23 133 lb (60.3 kg)   02/08/23 190 lb (86.2 kg)   07/28/22 200 lb (90.7 kg)   12/16/21 199 lb (90.3 kg)   11/01/21 190 lb (86.2 kg)     Exam:   GENERAL: well developed, well nourished, in no apparent distress, oriented.  SKIN: no rashes, no suspicious lesions  HEENT: normal  NECK: supple; no thyromegaly, no adenopathy  LUNGS: clear to auscultation  CARDIOVASCULAR: normal S1, S2, RRR  BREASTS: soft, nontender, no palpable masses or nodes, no nipple discharge, no skin changes, no axillary adenopathy  ABDOMEN: no  scars,  soft, non distended; non tender, no masses  PELVIC: External Genitalia: Normal appearing, no lesions.    Vagina: normal pink mucosa, no lesions, normal clear discharge.    Bladder well supported.  No  anterior or posterior hernias    Cervix: multiparous - string visible, no lesions , No CMT     Uterus: NSS, mobile, non tender, normal size    Adnexa: non tender, no masses, normal size    Rectal: deferred  EXTREMITIES:  non tender without edema    Pelvic u/s of 8/26/20:     Anteverted uterus--9.4 x 7.2 x 5.1 cm, with an endometrial stripe of 5 mm   Right ovary-3.4 x 2.8 x 2.2 cm   Left ovary-not visualized   No free fluid seen   IUD seen in place   Multiple intramural myomata noted: 2.5 x 2.2 cm, 2.2 x 1.9 cm, 1.3 x 1.2 cm, 3.4 x 3.0 cm   A pedunculated uterine myoma on the left was seen 4.3 x 3.1 cm     A/P: Patient is 44 year old female with no complaints. Here for well woman exam.            Patient counseled on:    Diet/exercise.      Self Breast Exams     Safe sex practices / and living environment     Vaccines:  Annual Flu, Tdap +/- Gardasil not recommended (up to 45 yrs).      Pneumococcal at 65 yrs old, Shingles at 60 yrs old          Pap: done  GC/Chlamydia:  na  Mammogram:  ordered   Dexa:  na  Colonoscopy: normal age less than 45.       Lipid / Cholesterol:    Component  Ref Range & Units 1/5/24  6:28 AM   Triglycerides  <150 mg/dL 139   Cholesterol, Total  <200.00 mg/dL 184.70   HDL Cholesterol  >=40.00 mg/dL 60.10   LDL Cholesterol, Calc  <=129.0 mg/dL 96.8   Chol/HDL Ratio  <=5.0 3.1   LDL/HDL Ratio 1.6   VLDL  10 - 30 mg/dL 28   Non-HDL Cholesterol  mg/dL 125          Meds This Visit:    Requested Prescriptions      No prescriptions requested or ordered in this encounter       1. Visit for screening mammogram  - [facility] PARAM 2D+3D SCREENING BILAT (CPT=77067/04834); Future    2. Encounter for well woman exam with routine gynecological exam  - Hpv High Risk , Thin Prep Collect; Future  - ThinPrep PAP  Smear B; Future    3. Annual physical exam    4. IUD contraception  - [facility] PROC FOR PA      Return in about 2 weeks (around 4/11/2024), or if symptoms worsen or fail to improve, for IUD insertion.  Bismark Silva MD   3/28/2024  4:40 PM

## 2024-03-29 LAB — HPV I/H RISK 1 DNA SPEC QL NAA+PROBE: NEGATIVE

## 2024-04-02 LAB
.: NORMAL
.: NORMAL

## 2024-04-11 ENCOUNTER — OFFICE VISIT (OUTPATIENT)
Facility: CLINIC | Age: 44
End: 2024-04-11
Payer: COMMERCIAL

## 2024-04-11 VITALS
DIASTOLIC BLOOD PRESSURE: 80 MMHG | BODY MASS INDEX: 30.55 KG/M2 | HEIGHT: 62 IN | SYSTOLIC BLOOD PRESSURE: 122 MMHG | WEIGHT: 166 LBS

## 2024-04-11 DIAGNOSIS — Z32.00 ENCOUNTER FOR PREGNANCY TEST, RESULT UNKNOWN: Primary | ICD-10-CM

## 2024-04-11 DIAGNOSIS — Z30.433 ENCOUNTER FOR REMOVAL AND REINSERTION OF INTRAUTERINE CONTRACEPTIVE DEVICE (IUD): ICD-10-CM

## 2024-04-11 PROBLEM — Z97.5 IUD CONTRACEPTION: Status: ACTIVE | Noted: 2024-04-11

## 2024-04-11 LAB
CONTROL LINE PRESENT WITH A CLEAR BACKGROUND (YES/NO): YES YES/NO
KIT LOT #: NORMAL NUMERIC
PREGNANCY TEST, URINE: NEGATIVE

## 2024-04-11 PROCEDURE — 58301 REMOVE INTRAUTERINE DEVICE: CPT | Performed by: OBSTETRICS & GYNECOLOGY

## 2024-04-11 PROCEDURE — 81025 URINE PREGNANCY TEST: CPT | Performed by: OBSTETRICS & GYNECOLOGY

## 2024-04-11 PROCEDURE — 3079F DIAST BP 80-89 MM HG: CPT | Performed by: OBSTETRICS & GYNECOLOGY

## 2024-04-11 PROCEDURE — 3074F SYST BP LT 130 MM HG: CPT | Performed by: OBSTETRICS & GYNECOLOGY

## 2024-04-11 PROCEDURE — 58300 INSERT INTRAUTERINE DEVICE: CPT | Performed by: OBSTETRICS & GYNECOLOGY

## 2024-04-11 PROCEDURE — 3008F BODY MASS INDEX DOCD: CPT | Performed by: OBSTETRICS & GYNECOLOGY

## 2024-04-11 NOTE — PROGRESS NOTES
IUD Removal and Insertion     Pregnancy Results: negative from urine test   Birth control method(s) used: H - IUD     LMP: No LMP recorded. (Menstrual status: IUD - Intrauterine Device).      IUD Removal:        Pt counseled on removal of Mirena IUD.  Discussed return to fertility and need for contraception if patient does not desire pregnancy at this time.  Discussed side effects and risks of removal. Pt understands and consent signed.  Procedure discussed with the patient in detail including indication, risks, benefits, alternatives and complications.     Pelvic Exam Findings:  Cervix normal.    Procedure:  Speculum placed in the vagina.  Strings were visualized.  The ring forceps was used to grasp IUD strings.  The  IUD was removed without difficulty and shown to the patient  The patient tolerated the procedure well.             INSERTION    COUNSELING:     Procedure discussed with the patient in detail including indication, risks, benefits, alternatives and complications.  This included but was not limited to:  Counseling  on the IUD options, including Nadiay, Kyleena, Mirena, Liletta & Paragard. Risks, benefits, indications and alternatives, as well as proper use and common side effects for IUDs were reviewed.  Specifically, irregular bleeding patterns, painful periods, lighter or absent periods.  She understands the risk of perforation during placement, migration, and expulsion.  The failure rate of 2-1000.     Insertion procedure was described. Insertion done on  day 1-5 of menses unless lactating or currently using an IUD for contraception. No Cytotec was taken last night and this morning prior to insertion if nulliparous uteri or history of  section.  Patient also instructed to use ibuprofen 600 mg one hour prior to insertion. Risks of uterine perforation, bleeding, infection especially in the first 21 days, IUD migration out of the uterus/expulsion after placement were mentioned.       PROCEDURE:    Bimanual exam was performed before the procedure and an ultrasound was reviewed if one done. Speculum was introduced into the vagina and the area was prepped with betadine if not allergic. The cervix was grasped with single tooth tenaculum.  No Sounding of the cavity was done but the cervix required a small amount of dilation to allow the device to pass the internal os.   The device was than placed angling along the uterine axis.  The Mirena  IUD was deployed per the IFU to a depth of 8 (6 - 10 cm's).  The string was cut at the appropriate length.  Fluids and instruments were removed from the vagina.   The patient was left supine if orthostatic symptoms occurred. Procedure tolerated well.       POST INSERTION COUNSELING:    Patient was instructed on pelvic rest until the spotting resolves. She is aware that IUDs do not prevent STIs. She is also aware that she would need to avoid tampon use until her follow up appointment.  All questions were answered.    Follow up 2 weeks      The applicator did not slide out easily - maybe due to snug internal os - left string slightly longer - will trim in two weeks if needed.    Normal post placement instructions    Bismark Silva MD  4/11/24

## 2024-04-25 ENCOUNTER — MED REC SCAN ONLY (OUTPATIENT)
Facility: CLINIC | Age: 44
End: 2024-04-25

## 2025-04-01 ENCOUNTER — TELEPHONE (OUTPATIENT)
Facility: CLINIC | Age: 45
End: 2025-04-01

## 2025-05-19 ENCOUNTER — OFFICE VISIT (OUTPATIENT)
Dept: NEUROLOGY | Facility: CLINIC | Age: 45
End: 2025-05-19
Payer: COMMERCIAL

## 2025-05-19 VITALS — DIASTOLIC BLOOD PRESSURE: 73 MMHG | HEART RATE: 93 BPM | SYSTOLIC BLOOD PRESSURE: 107 MMHG

## 2025-05-19 DIAGNOSIS — R40.4 STARING EPISODES: Primary | ICD-10-CM

## 2025-05-19 RX ORDER — PANTOPRAZOLE SODIUM 40 MG/1
40 TABLET, DELAYED RELEASE ORAL DAILY
COMMUNITY
Start: 2024-12-10

## 2025-05-19 RX ORDER — LORAZEPAM 2 MG/1
TABLET ORAL
COMMUNITY
Start: 2025-05-14

## 2025-05-19 RX ORDER — BENZTROPINE MESYLATE 1 MG/1
1 TABLET ORAL
COMMUNITY
Start: 2025-04-14

## 2025-05-19 RX ORDER — ESCITALOPRAM OXALATE 10 MG/1
10 TABLET ORAL
COMMUNITY
Start: 2025-04-29

## 2025-05-19 RX ORDER — LACTULOSE 10 G/15ML
SOLUTION ORAL; RECTAL
COMMUNITY
Start: 2023-04-16

## 2025-05-19 RX ORDER — POLYETHYLENE GLYCOL-3350 AND ELECTROLYTES 236; 6.74; 5.86; 2.97; 22.74 G/274.31G; G/274.31G; G/274.31G; G/274.31G; G/274.31G
POWDER, FOR SOLUTION ORAL
COMMUNITY
Start: 2024-12-11

## 2025-05-19 RX ORDER — FOLIC ACID 1 MG/1
1 TABLET ORAL DAILY
COMMUNITY
Start: 2023-04-16 | End: 2025-05-21

## 2025-05-19 RX ORDER — MELATONIN
100 DAILY
COMMUNITY
Start: 2025-04-21 | End: 2025-05-21

## 2025-05-19 RX ORDER — MULTIPLE VITAMINS W/ MINERALS TAB 9MG-400MCG
1 TAB ORAL DAILY
COMMUNITY
Start: 2025-04-21 | End: 2025-05-21

## 2025-05-19 RX ORDER — QUETIAPINE FUMARATE 25 MG/1
TABLET, FILM COATED ORAL
COMMUNITY
Start: 2025-04-24

## 2025-05-19 RX ORDER — PALIPERIDONE 3 MG/1
3 TABLET, EXTENDED RELEASE ORAL EVERY MORNING
COMMUNITY
Start: 2024-10-26

## 2025-05-19 RX ORDER — OLANZAPINE 7.5 MG/1
7.5 TABLET, FILM COATED ORAL NIGHTLY
COMMUNITY
Start: 2025-04-28

## 2025-05-19 RX ORDER — HALOPERIDOL 0.5 MG/1
0.5 TABLET ORAL
COMMUNITY
Start: 2025-05-08

## 2025-05-19 RX ORDER — FENOFIBRATE 67 MG/1
CAPSULE ORAL
COMMUNITY

## 2025-05-19 NOTE — PROGRESS NOTES
[facility] [address], [address]  [facility] [address]  [phone]            Neurology Initial Visit     Referred By: Dr. Awan ref. provider found    Chief Complaint:   Chief Complaint   Patient presents with    Neurologic Problem     Patient presents here today to establish care, patient was referred by Dr Dane Marvin (psychiatrist from [facility]) to evaluate and treat blackouts. Patient c/o having episodes of black outs when she is not her self and not remember any of it. Patient recent episode was when she went to the ED at [facility] on 4/20/25.  Patient is taking levetiracetam 750 MG.   Patient gives verbal consent to use [facility].        History of Present Illness  Idania Feldman is a 45 year old female who presents with episodes of blackouts and altered behavior. She was referred by her primary care doctor for evaluation of her episodes.    She has been experiencing episodes of blackouts and altered behavior for the past few years. These episodes occur without warning, and she is often informed by others about her behavior during these times. Her  describes these episodes as her 'other personality.' During these episodes, she may appear intoxicated or engage in dangerous activities, such as taking ten Lunesta pills during a fugue state. She has also experienced a blackout while driving, which led her to stop driving due to safety concerns.    The episodes vary in duration, lasting from a few minutes to as long as a week, during which she may require hospitalization. She has been on Keppra for about a year, which initially seemed to reduce the frequency of episodes, but she recently experienced a cluster of episodes a couple of weeks ago. She recalls a significant episode during which she was hospitalized and her ammonia levels were found to be very elevated.    Her sleep is described as spotty, and she has a history of sleep apnea diagnosed six or seven years  ago, for which she used a CPAP machine that is currently broken. She has undergone five sleep studies, with the last one conducted in 2017. She reports low energy levels during the day and difficulty falling and staying asleep.    No childhood seizures, but she recalls a head injury where she fell and sustained a subdural hematoma, which resolved without surgery. She has been on seizure medications before, including Depakote, but does not recall being on lamotrigine.    She lives with her  and children and is concerned about having episodes without anyone noticing, especially during the night when she is awake due to insomnia.        Past Medical History[1]    Past Surgical History[2]    Social history:  History   Smoking Status    Every Day    Types: Cigarettes   Smokeless Tobacco    Never     History   Alcohol Use Not Currently     Comment: rare     History   Drug Use No       Family History[3]    Medications - Current[4]    Allergies[5]    ROS:   As in HPI, the rest of the 14 system review was done and was negative      Physical Exam:  Vitals:    05/19/25 1500   BP: 107/73   Pulse: 93       General: No apparent distress, well nourished, well groomed.  Head- Normocephalic, atraumatic  Eyes- No redness or swelling    Neurological:   Mental Status:  Mental Status- Alert, conversant, speech fluent, following all commands    Cranial Nerves:  II.- Visual fields full to confrontation, III, IV, VI- EOM intact, and VII. Face symmetric, no facial weakness    Motor Exam:  Strength- upper extremities 5/5 proximally and distally                - lower  extremities 5/5 proximally and distally    Sensory Exam:  Light touch- intact in all 4 extremities    Deep Tendon Reflexes:  1+ BUE  Absent BLE    Coordination:  No dysmetria with finger to nose bilaterally  Mild high frequency tremor BUE    Gait:  Normal casual gait    Labs:    Lab Results   Component Value Date    TSH 2.840 03/09/2021     Lab Results   Component Value  Date    HDL 45 02/20/2021    LDL 88 02/20/2021    TRIG 149 02/20/2021     Lab Results   Component Value Date    HGB 11.4 (L) 02/09/2023    HCT 36.1 02/09/2023    MCV 98.9 02/09/2023    WBC 4.7 02/09/2023    .0 02/09/2023      Lab Results   Component Value Date    BUN 8 02/09/2023    CA 8.6 02/09/2023    ALT 17 02/07/2023    AST 11 (L) 02/07/2023    ALB 3.4 02/07/2023     02/09/2023    K 4.3 02/09/2023     (H) 02/09/2023    CO2 23.0 02/09/2023      I have reviewed labs.    Imaging Studies:  I have independently reviewed imaging.    Assessment & Plan  Episodes of blackouts and altered behavior  Episodes with altered behavior, memory loss, and dangerous actions suggest seizures, sleep events, or psychogenic causes. Recent episodes despite Keppra. EEG planned to evaluate seizures. Sleep apnea may contribute. Psychogenic causes considered if EEG normal. Explained EEG monitoring benefits and family documentation importance.  - Order 72-hour ambulatory EEG.  - Continue Keppra 750mg for now  - Refer to sleep clinic for evaluation and potential sleep study.  - Coordinate with family to document episodes during EEG monitoring.  - Continue to abstain from driving    Sleep apnea  Untreated sleep apnea may contribute to episodes and other health issues. Discussed treatment importance for brain and heart health.  - Refer to sleep clinic for evaluation and potential new sleep study.  - Coordinate replacement of sleep apnea machine if needed.        Education and counseling provided to patient. Instructed patient to call my office or seek medical attention immediately if symptoms worsen.  Patient verbalized understanding of information given. All questions were answered. All side effects of drugs were discussed.     I have spent 37 min total time on the day of the encounter, including: Total time spent reasons:  Preparing to see the patient, Obtaining and/or reviewing separately obtained history, Performing a  medically appropriate examination and/or evaluation, Counseling and educating the patient/family/caregiver, Ordering medications, tests, or procedures, Documenting clinical information in Epic, and Independently interpreting results and communicating results to the patient/family/caregiver      Return to clinic in: Return in about 3 months (around 8/19/2025).    Kasia Mendez MD         [1]   Past Medical History:   Anxiety    Anxiety state, unspecified    also PTSD    Asthma (HCC)    Back problem    Bipolar 2 disorder (HCC)    Dementia (HCC)    Depression    Extrinsic asthma, unspecified    Fibromyalgia    Migraines    Neuropathy    legs    Pain    Pancreatitis (HCC)    Pancreatitis (HCC)    Reflux    Sleep apnea    uses cpap at night   [2]   Past Surgical History:  Procedure Laterality Date    Cholecystectomy      Colonoscopy & polypectomy  5/14    polyp; repeat 5 yrs    Colonoscopy,remv lesn,snare N/A 5/30/2014    Procedure: COLONOSCOPY, POSSIBLE BIOPSY, POSSIBLE POLYPECTOMY 37578;  Surgeon: Luis Fernando Read MD;  [facility]    Elbow arthroscop,diagnostic      left    Other      Mass in the left arm    Other surgical history      right ankle surg x 3 times - no metal    Other surgical history      EGD x 5 times    Other surgical history      bile duct stent    Other surgical history  08/16/16    left elbow cyst    Removal gallbladder     [3]   Family History  Problem Relation Age of Onset    Breast Cancer Sister 38    Ovarian Cancer Sister 38    Other (lupus) Sister     Other (strokes) Sister     Psychiatric Mother     Diabetes Mother     Other (Other) Mother         thyroid    Heart Disease Father     Cancer Neg     Stroke Neg    [4]   Current Outpatient Medications:     QUEtiapine 25 MG Oral Tab, TAKE 2 TABLETS (50MG TOTAL)AT BEDTIME FOR INSOMNIA ANDPARANOIA, Disp: , Rfl:     LORazepam 2 MG Oral Tab, , Disp: , Rfl:     thiamine 100 MG Oral Tab, Take 1 tablet (100 mg total) by mouth  daily., Disp: , Rfl:     GAVILYTE-G 236 g Oral Recon Soln, TAKE 4000ML BY MOUTH FOR A ONE TIME DOSE, Disp: , Rfl:     pantoprazole 40 MG Oral Tab EC, Take 1 tablet (40 mg total) by mouth daily., Disp: , Rfl:     paliperidone ER 3 MG Oral Tablet 24 Hr, Take 1 tablet (3 mg total) by mouth every morning., Disp: , Rfl:     OLANZapine 7.5 MG Oral Tab, Take 1 tablet (7.5 mg total) by mouth nightly., Disp: , Rfl:     multivitamin with minerals Oral Tab, Take 1 tablet by mouth daily., Disp: , Rfl:     haloperidol 0.5 MG Oral Tab, Take 1 tablet (0.5 mg total) by mouth daily with breakfast., Disp: , Rfl:     lactulose 10 GM/15ML Oral Solution, , Disp: , Rfl:     folic acid 1 MG Oral Tab, Take 1 tablet (1 mg total) by mouth daily., Disp: , Rfl:     escitalopram 10 MG Oral Tab, Take 1 tablet (10 mg total) by mouth daily with breakfast., Disp: , Rfl:     benztropine 1 MG Oral Tab, Take 1 tablet (1 mg total) by mouth daily as needed., Disp: , Rfl:     levetiracetam 750 MG Oral Tab, Take 1 tablet (750 mg total) by mouth before breakfast., Disp: , Rfl:     levothyroxine 75 MCG Oral Tab, Take 1 tablet (75 mcg total) by mouth every morning before breakfast., Disp: , Rfl:     Fluticasone Furoate-Vilanterol 100-25 MCG/INH Inhalation Aerosol Powder, Breath Activated, Inhale 1 puff into the lungs daily., Disp: 28 each, Rfl: 0    Albuterol Sulfate  (90 BASE) MCG/ACT Inhalation Aero Soln, Inhale 3 puffs into the lungs as needed for Wheezing., Disp: , Rfl:     fenofibrate micronized 67 MG Oral Cap, TAKE 1 CAPSULE ONCE DAILY  WITH BREAKFAST, Disp: , Rfl:     gabapentin (NEURONTIN) 100 MG Oral Cap, Take 1 capsule (100 mg total) by mouth 3 (three) times daily. (Patient not taking: Reported on 3/28/2024), Disp: 60 capsule, Rfl: 0    traZODone 100 MG Oral Tab, Take 1 tablet (100 mg total) by mouth nightly as needed for Sleep. (Patient not taking: Reported on 3/28/2024), Disp: 60 tablet, Rfl: 2    lidocaine-menthol 4-1 % External Patch,  Place 1 patch onto the skin daily. (Patient not taking: Reported on 3/28/2024), Disp: 15 patch, Rfl: 0    Lithium Carbonate  MG Oral Tab CR, Take 2 tablets (600 mg total) by mouth nightly. (Patient not taking: Reported on 3/28/2024), Disp: 60 tablet, Rfl: 5  [5]   Allergies  Allergen Reactions    Alprazolam OTHER (SEE COMMENTS)     due to amnestic episodes of delirium  Patient denies 12/16    Cyclobenzaprine OTHER (SEE COMMENTS)     due to amnestic episodes of delirium  Patient denies 12/16    Eszopiclone OTHER (SEE COMMENTS)    Seasonal OTHER (SEE COMMENTS)     Runny nose

## 2025-05-19 NOTE — PROGRESS NOTES
The following individual(s) verbally consented to be recorded using ambient AI listening technology and understand that they can each withdraw their consent to this listening technology at any point by asking the clinician to turn off or pause the recording:    Patient name: Idania Feldman  Additional names:

## 2025-05-20 ENCOUNTER — TELEPHONE (OUTPATIENT)
Dept: NEUROLOGY | Facility: CLINIC | Age: 45
End: 2025-05-20

## 2025-05-20 DIAGNOSIS — R40.4 STARING EPISODES: Primary | ICD-10-CM

## 2025-05-20 NOTE — TELEPHONE ENCOUNTER
Pt called asking if order for eeg could be changed for her to get it done at [facility]. Per pt the order needs to be updated to reflect that change.

## 2025-05-21 NOTE — TELEPHONE ENCOUNTER
Pt called again asking if order for eeg could be changed for her to get it done at [facility]. Per pt the order needs to be updated to reflect that change. Pt also is asking for office to give a call letting her know the change has been made

## 2025-06-23 ENCOUNTER — NURSE ONLY (OUTPATIENT)
Dept: ELECTROPHYSIOLOGY | Facility: HOSPITAL | Age: 45
End: 2025-06-23
Attending: INTERNAL MEDICINE
Payer: COMMERCIAL

## 2025-06-23 DIAGNOSIS — R40.4 STARING EPISODES: ICD-10-CM

## 2025-06-23 PROCEDURE — 95700 EEG CONT REC W/VID EEG TECH: CPT

## 2025-06-23 PROCEDURE — 95708 EEG WO VID EA 12-26HR UNMNTR: CPT

## 2025-06-24 ENCOUNTER — NURSE ONLY (OUTPATIENT)
Dept: ELECTROPHYSIOLOGY | Facility: HOSPITAL | Age: 45
End: 2025-06-24
Attending: INTERNAL MEDICINE
Payer: COMMERCIAL

## 2025-06-24 PROCEDURE — 95708 EEG WO VID EA 12-26HR UNMNTR: CPT

## 2025-06-24 NOTE — PROCEDURES
Idania came in today to get her batteries changed and first 24 hours of Ambulatory EEG uploaded. She stated her head was itchy. Her forehead was a little red. I notified Dr. Mendez that we captured an event and her head was itchy. Dr. Mendez said if necessary we could end study prematurely. I called Idania @ 2617 to let her know if her head was getting too itchy or uncomfortable she could remove electrodes after 48 hours per Dr. Mendez. She stated she wanted to keep on for 72 hours in hopes of finding out what is going on.

## 2025-06-25 ENCOUNTER — NURSE ONLY (OUTPATIENT)
Dept: ELECTROPHYSIOLOGY | Facility: HOSPITAL | Age: 45
End: 2025-06-25
Attending: INTERNAL MEDICINE
Payer: COMMERCIAL

## 2025-06-25 PROCEDURE — 95708 EEG WO VID EA 12-26HR UNMNTR: CPT

## 2025-06-26 ENCOUNTER — NURSE ONLY (OUTPATIENT)
Dept: ELECTROPHYSIOLOGY | Facility: HOSPITAL | Age: 45
End: 2025-06-26
Attending: INTERNAL MEDICINE

## 2025-07-07 ENCOUNTER — TELEPHONE (OUTPATIENT)
Dept: NEUROLOGY | Facility: CLINIC | Age: 45
End: 2025-07-07

## 2025-07-09 NOTE — TELEPHONE ENCOUNTER
The EEG has not yet been uploaded by [facility].  I've reached out to them to figure out what is going on.  Once it is uploaded I can read it and will report back to the patient.

## 2025-07-17 ENCOUNTER — PATIENT MESSAGE (OUTPATIENT)
Dept: NEUROLOGY | Facility: CLINIC | Age: 45
End: 2025-07-17

## 2025-08-08 ENCOUNTER — PATIENT MESSAGE (OUTPATIENT)
Dept: NEUROLOGY | Facility: CLINIC | Age: 45
End: 2025-08-08

## (undated) DEVICE — PAD GROUND ELECTRODE 1.5M

## (undated) DEVICE — KENDALL SCD EXPRESS SLEEVES, KNEE LENGTH, MEDIUM: Brand: KENDALL SCD

## (undated) DEVICE — GOWN,SIRUS,FABRIC-REINFORCED,X-LARGE: Brand: MEDLINE

## (undated) DEVICE — GLOVE SURG SENSICARE SZ 7

## (undated) DEVICE — PROXIMATE SKIN STAPLERS (35 WIDE) CONTAINS 35 STAINLESS STEEL STAPLES (FIXED HEAD): Brand: PROXIMATE

## (undated) DEVICE — SOL  .9 1000ML BTL

## (undated) DEVICE — BANDAID COVERLET 1X3

## (undated) DEVICE — GLOVE SURG SENSICARE SZ 7-1/2

## (undated) DEVICE — SHEET, T, LAPAROTOMY, STERILE: Brand: MEDLINE

## (undated) DEVICE — REMOVER DURAPREP 3M

## (undated) DEVICE — PROXIMATE RH ROTATING HEAD SKIN STAPLERS (35 WIDE) CONTAINS 35 STAINLESS STEEL STAPLES: Brand: PROXIMATE

## (undated) DEVICE — TOWEL: OR BLU 80/CS: Brand: MEDICAL ACTION INDUSTRIES

## (undated) DEVICE — DRAPE C-ARM UNIVERSAL

## (undated) DEVICE — BLADE #24 STRL PERSONNA PLUS

## (undated) DEVICE — LAPAROTOMY CDS: Brand: MEDLINE INDUSTRIES, INC.

## (undated) DEVICE — RF CANNULA, CVD: Brand: VENOM

## (undated) DEVICE — PAIN TRAY: Brand: MEDLINE INDUSTRIES, INC.

## (undated) DEVICE — DRAPE WARMER ORS-300

## (undated) DEVICE — DRAPE HALF 40X58 DYNJP2410

## (undated) DEVICE — GLOVE ORTHO ALOETOUCH SZ 71/2

## (undated) DEVICE — SUTURE ETHILON 3-0 FSLX

## (undated) DEVICE — SUTURE VICRYL 3-0 SH

## (undated) DEVICE — NEEDLE SPINAL 22X5 405148

## (undated) DEVICE — GLOVE SURG SENSICARE SZ 6-1/2

## (undated) DEVICE — UNDYED BRAIDED (POLYGLACTIN 910), SYNTHETIC ABSORBABLE SUTURE: Brand: COATED VICRYL

## (undated) DEVICE — MEDI-VAC SUCTION HANDLE REGULAR CAPACITY: Brand: CARDINAL HEALTH

## (undated) NOTE — MR AVS SNAPSHOT
[facility]  [address] [facility], [address] [place] [address]                    After Visit Summary   2/6/2017    Irais Mtz    MRN: [id number]           Visit Information        Provider Department Dept Phone    2/6/ Infective myositis of left upper extremity   [[id number]]  -  Primary       Future Appointments        Provider Department    2/22/2017 8:30 AM [address], [address] [place] [address]    2/24/2017 11:40 AM Jun Yadav Medi

## (undated) NOTE — MR AVS SNAPSHOT
[address] [address] [address], [address]  [facility] [address] [phone]                    After Visit Summary   1/16/2017    Rachel Parikh    MRN: [id number]           Visit Information        Provider Department D Levonorgestrel (MIRENA) 20 MCG/24HR Intrauterine IUD 1 each by Intrauterine route once.       Diagnoses for This Visit     Mass of arm, left   [[id number]]  -  Primary       Future Appointments        Provider Department    1/20/2017 11:00 AM Maddie Riggins

## (undated) NOTE — LETTER
Patient Name: Sury Bailey        : 1980       Medical Record #: [id number]    CONSENT FOR PROCEDURES/SEDATION    Date: 1/10/2018       Time: 9:34 AM        1.  I authorize the performance upon Sury Bailey the following:    Trigger denice

## (undated) NOTE — IP AVS SNAPSHOT
Patient Demographics     Address  [address] [place] [address] Phone  [phone] [facility])  [phone] (Mobile) *Preferred* E-mail Address  [email or web address]      Emergency Contact(s)     Name Relation Home Work [address] [place] [address] Take 1 tablet (500 mg total) by mouth nightly. Paras Manning MD         Lithium Carbonate  MG Tbcr  Commonly known as: LITHOBID  Next dose due: Tuesday at 9pm      Take 2 tablets (600 mg total) by mouth nightly.    MD Jillian Hurst ([phone] Heparin Sodium (Porcine) 5000 UNIT/ML injection 5,000 Units 02/23/21 0525 Given            RIGHT LOWER ABDOMEN     Order ID Medication Name Action Time Action Reason Comments    [id number] Heparin Sodium (Porcine) 5000 UNIT/ML injection 5,000 Uni Narrative: The following orders were created for panel order CBC WITH DIFFERENTIAL WITH PLATELET.   Procedure                               Abnormality         Status                     ---------                               -----------         ------ • Anxiety state, unspecified     also PTSD   • Asthma    • Back problem    • Bipolar affective ([facility] [address]    • Depression    • Extrinsic asthma, unspecified    • Fibromyalgia    • Migraines    • Neuropathy     legs   • Pain    • Pancreatitis 2011   • Pancreatit Performed by Jing Brown MD at [facility] MAIN OR   • Eloy [address] Left 2/17/2015    Performed by Jing Brown MD at [facility] MAIN OR   • Eloy [address] Right 8/5/2014    Performed by [facility] Social History:[AR.1]  reports that she has quit smoking. Her smoking use included cigarettes. She has a 10.50 pack-year smoking history. She has never used smokeless tobacco. She reports that she does not drink alcohol or use drugs. [AR.2]    Family Histor •  Albuterol Sulfate  (90 BASE) MCG/ACT Inhalation Aero Soln, Inhale 3 puffs into the lungs as needed for Wheezing., Disp: , Rfl:     •  Levonorgestrel (MIRENA) 20 MCG/24HR Intrauterine IUD, 1 each by Intrauterine route once., Disp: , Rfl:[AR.2] No results for input(s): PTP, INR in the last 168 hours. No results for input(s): TROP, CK in the last 168 hours. [AR.2]    Imaging: Imaging data reviewed in Epic. ASSESSMENT / PLAN:     1. Lower extremity weakness  1.  MRI performed in ED, awaiting Mira Farr is a a(n) 42year old female with history of reji, agitation, psychosis, transferred here from [facility] after having onset yesterday of inability to move her limbs.  She reports slightly earlier yesterday she had onset of an ocular migraine, w Performed by Holly Zimmer MD at [facility] MAIN OR   • OTHER      Mass in the left arm   • OTHER SURGICAL HISTORY      right ankle surg x 3 times - no metal   • OTHER SURGICAL HISTORY      EGD x 5 times   • OTHER SURGICAL HISTORY      bile duct stent   • OTH • SYNVISC INJECTION OF BILATERAL KNEE JOINT Bilateral 10/11/2016    Performed by Asia Kitchen MD at [facility] MAIN OR   • TRANSFORAMINAL EPIDURAL - LUMBAR N/A 7/22/2019    Performed by Gabriela Davis MD at [address] [place] [address]   • TRANSFORAMINAL •  gabapentin (NEURONTIN) cap 200 mg, 200 mg, Oral, TID  •  clonazePAM (KLONOPIN) tab 2 mg, 2 mg, Oral, Nightly PRN **OR** clonazePAM (KLONOPIN) tab 4 mg, 4 mg, Oral, Nightly PRN    Review of Systems:  A 10-point system was reviewed.   Pertinent positives a 2. No abnormality demonstrated in MRI of the thoracic spine. MRI c spine  CONCLUSION:    1. New small right paracentral disc protrusion/extrusion at C6-7, abutting the right ventral surface of the spinal cord.   This is superimposed upon stable diffuse d Antonio Steve   pager [phone][CS.1]          Electronically signed by Vicky Amaya DO on 2/22/2021  4:41 PM   Attribution Key    CS. 1 - Vicky Amaya DO on 2/22/2021  4:31 PM                        Discharge Summary - D/C Summary Lab/Test results pending at Discharge:[GS.1]   · no[GS.2]    Consultants:[GS.1]  ?  Neuro, Psych[GS.2]     Discharge Medication List:     Discharge Medications      CONTINUE taking these medications      Instructions Prescription details   Albuterol Sulfate Take 650 mg by mouth every 8 (eight) hours as needed. Refills: 0            [place] reviewed[place]GS.1] n./a[GS.2]    Follow-up appointment:   No follow-up provider specified.   Appointments for Next 30 Days 2/23/2021 - 3/25/2021      Date Arrival Time Visit T No notes of this type exist for this encounter.      Immunizations     Name Date      INFLUENZA 09/24/15     Pneumovax 23 09/24/15     TDAP 03/16/14       Future Appointments        Provider Carmita Moraes    2/25/2021  5:30 AM PACU [facility]

## (undated) NOTE — MR AVS SNAPSHOT
[address] [facility] [address] [phone]               Thank you for choosing us for your health care visit with Guillermo Figueroa MD.  We are glad to serve you and happy to provide you with this summary of your visi Take 1 capsule (75 mg total) by mouth 3 (three) times daily.    Commonly known as:  LYRICA           SAVELLA 25 MG Tabs   Generic drug:  Milnacipran HCl   TAKE 1 TABLET BY MOUTH TWICE DAILY           topiramate 50 MG Tabs   Take 50 mg by mouth 2 (two) times

## (undated) NOTE — MR AVS SNAPSHOT
[address] [facility] [address] [phone]               Thank you for choosing us for your health care visit with Sarah Johnson MD.  We are glad to serve you and happy to provide you with this summary of your visi family member will be picking up prescription, office must be given name of individual in advance and they must present an ID as well. ? The name of the person picking up your prescription must be documented in your chart.   Scheduling Tests    If your phy [facility] ([facility], [facility])    [facility] [address] 95 92 16            Feb 24, 2017 11:40 AM   Follow up with MD Carlos Kennedy 26 ([facility] Annabel Och topiramate 50 MG Tabs   Take 50 mg by mouth 2 (two) times daily.    Commonly known as:  TOPAMAX                   Today's Orders     Creatine Kinase (CK) (Not Creatinine)    Complete by:  Jan 24, 2017 (Approximate)    Assoc Dx:  Myalgia [M79.1]           S authorization numbers or be assured that none are required. You can then schedule your appointment. Failure to obtain required authorization numbers can create reimbursement difficulties for you.         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

## (undated) NOTE — LETTER
Idania ALLEN Gaston, :1980    CONSENT FOR PROCEDURE/SEDATION    1. I authorize the performance upon Idania Feldman  the following: Intrauterine Device Mirena    2. I authorize Dr. Bismark Silav MD (and whomever is designated as the doctor’s assistant), to perform the above-mentioned procedures.    3. If any unforeseen conditions arise during this procedure calling for additional  procedures, operations, or medications (including anesthesia and blood transfusion), I further request and authorize the doctor to do whatever he/she deems advisable in my interest.    4. I consent to the taking and reproduction of any photographs in the course of this procedure for professional purposes.    5. I consent to the administration of such sedation as may be considered necessary or advisable by the physician responsible for this service, with the exception of ______________________________________________________    6. I have been informed by my doctor of the nature and purpose of this procedure sedation, possible alternative methods of treatment, risk involved and possible complications.    7. If I have a Do Not Resuscitate (DNR) order in place, the physician and I (or the individual authorized to consent on my behalf) will discuss and agree as to whether the Do Not Resuscitate (DNR) order will remain in effect or will be discontinued during the performance of the procedure and the applicable recovery period. If the Do Not Resuscitate (DNR) order is discontinued and is to be reinstated following the procedure/recovery period, the physician will determine when the applicable recovery period ends for purposes of reinstating the Do Not Resuscitate (DNR) order.    Signature of Patient:_______________________________________________    Signature of person authorized to consent for patient:  _______________________________________________________________    Relationship to patient:  ____________________________________________    Witness: _________________________________________ Date:___________     Physician Signature: _______________________________ Date:___________

## (undated) NOTE — IP AVS SNAPSHOT
[facility] [place] [address] [place], [address] ~ [phone]                Discharge Summary   1/27/2017    Issac Forman           Admission Information        Provider Department    1/27/2017 Dejon Mcpherson MD  Rizwan Vazquez / Venus Morelos Commonly known as:  cyclobenzaprine   What changed:  See the new instructions.         TAKE 1 TABLET BY MOUTH THREE TIMES DAILY AS NEEDED FOR MUSCLE SPASMS    Eze Kamara     [    ]    [    ]    [    ]    [    ]       FLUoxetine HCl 20 MG Caps   Common Call the doctor for:  · Elevated temperature  · Bleeding  · Nausea/Vomiting  · Pain not relieved with pain medication    For life threatening emergencies (unexpected chest pain, difficulty breathing) call 911.     If you had a Urinary Catheter:  · You may h 92.7 (12/30/16)  30.5 (12/30/16)  32.8  (12/30/16)  196.0     (12/29/16)  3.8 (L) (12/29/16)  3.71 (L) (12/29/16)  11.4 (L) (12/29/16)  36.1 (12/29/16)  97.3 (12/29/16)  30.7 (12/29/16)  31.6  (12/29/16)  191.0       Recent Hematology Lab Results (cont.) Patient [address] to help you get signed up for insurance coverage. Patient [address] is a Federal Navigator program that can help with your Affordable Care Act coverage, as well as all types of Medicaid plans.   To get signed up and covere

## (undated) NOTE — MR AVS SNAPSHOT
[address] [facility] [address] [phone]               Thank you for choosing us for your health care visit with Alejandro Cronin MD.  We are glad to serve you and happy to provide you with this summary of your visi prescription must be signed by the provider, picked up in our office and physically brought to the pharmacy. A 30 day supply with no refills is the maximum allowed. ? If your prescription is due for a refill, you may be due for a follow up appointment. diligence, the insurance carrier gives the disclaimer that \"Although the procedure is authorized, this does not guarantee payment. \"    Ultimately the patient is responsible for payment. Thank you for your understanding in the matter.            Elina You can access your MyChart to more actively manage your health care and view more details from this visit by going to [email or web address].   If you've recently had a stay at the Hospital you can access your discharge instructions in [address] by latisha

## (undated) NOTE — LETTER
Date & Time: 10/1/2019, 11:09 AM  Patient: Williams Malcolm  Encounter Provider(s):    REDDY Gong       To Whom It May Concern:    Nan Smith was seen and treated in our department on 10/1/2019.   Patient will return to work on Friday if f

## (undated) NOTE — LETTER
10/18/17      Weaning protocol for your remaining Fentanyl 12 mcg patch    The current patch you have on you will wear for total of 4 days  The next patch wear for total of 5 days  The last patch wear for total of 7 days and then you will take that last pa

## (undated) NOTE — ED AVS SNAPSHOT
Sharla Medina   MRN: [id number]    Department:  [facility] Emergency Department   Date of Visit:  5/2/2018           Disclosure     Insurance plans vary and the physician(s) referred by the ER may not be covered by your plan.  Please contact yo tell this physician (or your personal doctor if your instructions are to return to your personal doctor) about any new or lasting problems. The primary care or specialist physician will see patients referred from the [facility] Emergency Department.  Izabela Valencia

## (undated) NOTE — IP AVS SNAPSHOT
[address]            (For Outpatient Use Only) Initial Admit Date: 2/21/2021   Inpt/Obs Admit Date: Inpt: N/A / Obs: 02/22/21   Discharge Date:    Halima Proper:  [de-identified]   MRN: [de-identified]   CSN: [id number]   CEID: [id number] Subscriber Name:  Ramiro Wolfe :    Subscriber ID:  Pt Rel to Subscriber:    Hospital Account Financial Class: Managed Care    2021

## (undated) NOTE — Clinical Note
02/23/2017          Amelia Sanabria [address] [phone]      Dear Isabela Peers,     We are contacting you from Dr. Dylan Petersen office. Your health is important to us.  We have not received test results for additional tests that your provid

## (undated) NOTE — MR AVS SNAPSHOT
[facility], [place] [address] [facility], [address] [place] [address] [phone]               Thank you for choosing us for your health care visit with Chava Smith MD.  We are glad to serve you and happy to provide you with this ? Written prescriptions must be picked up in office. ? Please allow the office 48-72 hours to fill the prescription. ? Patient must present photo ID at time of .   If a designated family member will be picking up prescription, office must be given This list is accurate as of: 2/22/17 11:59 PM.  Always use your most recent med list.                Albuterol Sulfate  (90 Base) MCG/ACT Aers   Inhale 3 puffs into the lungs as needed for Wheezing.            ALPRAZOLAM ER OR   Take 2 mg by mouth 2 - pregabalin 75 MG Caps            Results of Recent Testing     PAIN MANAGMENT DRUG PNL,URINE      Component Value Standard Range & Units    Codine Cutoff 40 ng/mL Not Detected     Morhine Cutoff 20 ng/mL Not Detected     6-Acetylmorhine Cutoff 20 ng/mL Ethyl-Glucuronide 500 ng/mL Not Detected     Marijuana Cutoff 20 ng/mL Not Detected     PCP Cutoff 25 ng/mL Not Detected     Carisoprodol Cutoff 100 ng/mL Not Detected     The carisoprodol immunoassay has cross-reactivity to carisoprodol   and meprobamate -Enter Username, Password: [email or web address]   Username: [email or web address]   Password: [email or web address]  Performed by [place] [address], [address] [facility] [phone]  www. Jaquelin Stoddard MD, Lab.  Director                  MyChart     Visit M Get your heart pumping – brisk walking, biking, swimming Even 10 minute increments are effective and add up over the week   2 ½ hours per week – spread out over time Use a tereso to keep you motivated   Don’t forget strength training with weights and resist

## (undated) NOTE — LETTER
Jemima Solomon MD        I acknowledge that I have been informed of the risk involved by refusing the Urine Pregnancy on Issac [address].     I, thereby, rel

## (undated) NOTE — ED AVS SNAPSHOT
Edvin Townsend   MRN: [id number]    Department:  [facility] Emergency Department   Date of Visit:  1/15/2020           Disclosure     Insurance plans vary and the physician(s) referred by the ER may not be covered by your plan.  Please contact y tell this physician (or your personal doctor if your instructions are to return to your personal doctor) about any new or lasting problems. The primary care or specialist physician will see patients referred from the [facility] Emergency Department.  Vasile Ring

## (undated) NOTE — LETTER
[address] Removal Waiver:    I hereby acknowledge that [facility] staff attempted the following interventions to remove my jewelry:     q  Elevate extremity    q  Ice extremity    q  Use soap & water    q  Use lotions or lubricant    q Nancy Gomez   :  1980  MRN:  [id number]  [facility]:  [id number]  Maryse Sutton Md  [facility] PRE-OP / [address] [place] [address]